# Patient Record
Sex: MALE | Race: WHITE | Employment: OTHER | URBAN - METROPOLITAN AREA
[De-identification: names, ages, dates, MRNs, and addresses within clinical notes are randomized per-mention and may not be internally consistent; named-entity substitution may affect disease eponyms.]

---

## 2017-07-17 ENCOUNTER — ANESTHESIA EVENT (OUTPATIENT)
Dept: SURGERY | Age: 67
End: 2017-07-17
Payer: MEDICARE

## 2017-07-18 ENCOUNTER — HOSPITAL ENCOUNTER (OUTPATIENT)
Age: 67
Setting detail: OUTPATIENT SURGERY
Discharge: HOME | End: 2017-07-18
Attending: ORTHOPAEDIC SURGERY | Admitting: ORTHOPAEDIC SURGERY
Payer: MEDICARE

## 2017-07-18 ENCOUNTER — ANESTHESIA (OUTPATIENT)
Dept: SURGERY | Age: 67
End: 2017-07-18
Payer: MEDICARE

## 2017-07-18 VITALS
TEMPERATURE: 97.8 F | SYSTOLIC BLOOD PRESSURE: 133 MMHG | DIASTOLIC BLOOD PRESSURE: 86 MMHG | RESPIRATION RATE: 16 BRPM | OXYGEN SATURATION: 95 % | WEIGHT: 163.5 LBS | BODY MASS INDEX: 23.46 KG/M2 | HEART RATE: 85 BPM

## 2017-07-18 PROCEDURE — 76010000138 HC OR TIME 0.5 TO 1 HR: Performed by: ORTHOPAEDIC SURGERY

## 2017-07-18 PROCEDURE — 76210000021 HC REC RM PH II 0.5 TO 1 HR: Performed by: ORTHOPAEDIC SURGERY

## 2017-07-18 PROCEDURE — 74011250636 HC RX REV CODE- 250/636: Performed by: ORTHOPAEDIC SURGERY

## 2017-07-18 PROCEDURE — 74011250637 HC RX REV CODE- 250/637: Performed by: ANESTHESIOLOGY

## 2017-07-18 PROCEDURE — 74011250636 HC RX REV CODE- 250/636

## 2017-07-18 PROCEDURE — 77030002916 HC SUT ETHLN J&J -A: Performed by: ORTHOPAEDIC SURGERY

## 2017-07-18 PROCEDURE — 74011250636 HC RX REV CODE- 250/636: Performed by: ANESTHESIOLOGY

## 2017-07-18 PROCEDURE — 77030031139 HC SUT VCRL2 J&J -A: Performed by: ORTHOPAEDIC SURGERY

## 2017-07-18 PROCEDURE — 76060000032 HC ANESTHESIA 0.5 TO 1 HR: Performed by: ORTHOPAEDIC SURGERY

## 2017-07-18 PROCEDURE — 77030018836 HC SOL IRR NACL ICUM -A: Performed by: ORTHOPAEDIC SURGERY

## 2017-07-18 PROCEDURE — 77030011640 HC PAD GRND REM COVD -A: Performed by: ORTHOPAEDIC SURGERY

## 2017-07-18 PROCEDURE — 74011000250 HC RX REV CODE- 250

## 2017-07-18 PROCEDURE — 77030000032 HC CUF TRNQT ZIMM -B: Performed by: ORTHOPAEDIC SURGERY

## 2017-07-18 PROCEDURE — 77030028224 HC PDNG CST BSNM -A: Performed by: ORTHOPAEDIC SURGERY

## 2017-07-18 PROCEDURE — 74011000250 HC RX REV CODE- 250: Performed by: ORTHOPAEDIC SURGERY

## 2017-07-18 RX ORDER — LIDOCAINE HYDROCHLORIDE 10 MG/ML
0.1 INJECTION INFILTRATION; PERINEURAL AS NEEDED
Status: DISCONTINUED | OUTPATIENT
Start: 2017-07-18 | End: 2017-07-18 | Stop reason: HOSPADM

## 2017-07-18 RX ORDER — SODIUM CHLORIDE, SODIUM LACTATE, POTASSIUM CHLORIDE, CALCIUM CHLORIDE 600; 310; 30; 20 MG/100ML; MG/100ML; MG/100ML; MG/100ML
150 INJECTION, SOLUTION INTRAVENOUS CONTINUOUS
Status: DISCONTINUED | OUTPATIENT
Start: 2017-07-18 | End: 2017-07-18 | Stop reason: HOSPADM

## 2017-07-18 RX ORDER — SODIUM CHLORIDE 0.9 % (FLUSH) 0.9 %
5-10 SYRINGE (ML) INJECTION AS NEEDED
Status: DISCONTINUED | OUTPATIENT
Start: 2017-07-18 | End: 2017-07-18 | Stop reason: HOSPADM

## 2017-07-18 RX ORDER — HYDROCODONE BITARTRATE AND ACETAMINOPHEN 5; 325 MG/1; MG/1
1 TABLET ORAL AS NEEDED
Status: DISCONTINUED | OUTPATIENT
Start: 2017-07-18 | End: 2017-07-18 | Stop reason: HOSPADM

## 2017-07-18 RX ORDER — HYDROCODONE BITARTRATE AND ACETAMINOPHEN 5; 325 MG/1; MG/1
1 TABLET ORAL
Qty: 20 TAB | Refills: 0 | Status: SHIPPED | OUTPATIENT
Start: 2017-07-18 | End: 2017-10-16

## 2017-07-18 RX ORDER — FENTANYL CITRATE 50 UG/ML
100 INJECTION, SOLUTION INTRAMUSCULAR; INTRAVENOUS ONCE
Status: DISCONTINUED | OUTPATIENT
Start: 2017-07-18 | End: 2017-07-18 | Stop reason: HOSPADM

## 2017-07-18 RX ORDER — FENTANYL CITRATE 50 UG/ML
INJECTION, SOLUTION INTRAMUSCULAR; INTRAVENOUS AS NEEDED
Status: DISCONTINUED | OUTPATIENT
Start: 2017-07-18 | End: 2017-07-18 | Stop reason: HOSPADM

## 2017-07-18 RX ORDER — FAMOTIDINE 20 MG/1
20 TABLET, FILM COATED ORAL ONCE
Status: COMPLETED | OUTPATIENT
Start: 2017-07-18 | End: 2017-07-18

## 2017-07-18 RX ORDER — SODIUM CHLORIDE 9 MG/ML
50 INJECTION, SOLUTION INTRAVENOUS CONTINUOUS
Status: DISCONTINUED | OUTPATIENT
Start: 2017-07-18 | End: 2017-07-18 | Stop reason: HOSPADM

## 2017-07-18 RX ORDER — MIDAZOLAM HYDROCHLORIDE 1 MG/ML
INJECTION, SOLUTION INTRAMUSCULAR; INTRAVENOUS AS NEEDED
Status: DISCONTINUED | OUTPATIENT
Start: 2017-07-18 | End: 2017-07-18 | Stop reason: HOSPADM

## 2017-07-18 RX ORDER — HYDROMORPHONE HYDROCHLORIDE 2 MG/ML
0.5 INJECTION, SOLUTION INTRAMUSCULAR; INTRAVENOUS; SUBCUTANEOUS
Status: DISCONTINUED | OUTPATIENT
Start: 2017-07-18 | End: 2017-07-18 | Stop reason: HOSPADM

## 2017-07-18 RX ORDER — ACETAMINOPHEN 500 MG
1000 TABLET ORAL
Status: DISCONTINUED | OUTPATIENT
Start: 2017-07-18 | End: 2017-07-18 | Stop reason: HOSPADM

## 2017-07-18 RX ORDER — LIDOCAINE HYDROCHLORIDE AND EPINEPHRINE 10; 10 MG/ML; UG/ML
INJECTION, SOLUTION INFILTRATION; PERINEURAL AS NEEDED
Status: DISCONTINUED | OUTPATIENT
Start: 2017-07-18 | End: 2017-07-18 | Stop reason: HOSPADM

## 2017-07-18 RX ORDER — LIDOCAINE HYDROCHLORIDE 5 MG/ML
INJECTION, SOLUTION INFILTRATION; INTRAVENOUS AS NEEDED
Status: DISCONTINUED | OUTPATIENT
Start: 2017-07-18 | End: 2017-07-18 | Stop reason: HOSPADM

## 2017-07-18 RX ORDER — SODIUM CHLORIDE 0.9 % (FLUSH) 0.9 %
5-10 SYRINGE (ML) INJECTION EVERY 8 HOURS
Status: DISCONTINUED | OUTPATIENT
Start: 2017-07-18 | End: 2017-07-18 | Stop reason: HOSPADM

## 2017-07-18 RX ORDER — MIDAZOLAM HYDROCHLORIDE 1 MG/ML
2 INJECTION, SOLUTION INTRAMUSCULAR; INTRAVENOUS
Status: DISCONTINUED | OUTPATIENT
Start: 2017-07-18 | End: 2017-07-18 | Stop reason: HOSPADM

## 2017-07-18 RX ORDER — CEFAZOLIN SODIUM IN 0.9 % NACL 2 G/50 ML
2 INTRAVENOUS SOLUTION, PIGGYBACK (ML) INTRAVENOUS ONCE
Status: COMPLETED | OUTPATIENT
Start: 2017-07-18 | End: 2017-07-18

## 2017-07-18 RX ADMIN — SODIUM CHLORIDE, SODIUM LACTATE, POTASSIUM CHLORIDE, AND CALCIUM CHLORIDE 150 ML/HR: 600; 310; 30; 20 INJECTION, SOLUTION INTRAVENOUS at 09:45

## 2017-07-18 RX ADMIN — FAMOTIDINE 20 MG: 20 TABLET ORAL at 09:45

## 2017-07-18 RX ADMIN — CEFAZOLIN 2 G: 1 INJECTION, POWDER, FOR SOLUTION INTRAMUSCULAR; INTRAVENOUS; PARENTERAL at 11:52

## 2017-07-18 RX ADMIN — LIDOCAINE HYDROCHLORIDE 50 ML: 5 INJECTION, SOLUTION INFILTRATION; INTRAVENOUS at 12:01

## 2017-07-18 RX ADMIN — MIDAZOLAM HYDROCHLORIDE 2 MG: 1 INJECTION, SOLUTION INTRAMUSCULAR; INTRAVENOUS at 11:56

## 2017-07-18 RX ADMIN — FENTANYL CITRATE 50 MCG: 50 INJECTION, SOLUTION INTRAMUSCULAR; INTRAVENOUS at 11:56

## 2017-07-18 RX ADMIN — FENTANYL CITRATE 50 MCG: 50 INJECTION, SOLUTION INTRAMUSCULAR; INTRAVENOUS at 12:03

## 2017-07-18 NOTE — DISCHARGE INSTRUCTIONS
POST OP INSTRUCTIONS      1. Patient has appointment for 7/27/17 at 3:35 PM at the [facility]. 2.  For problems call Dr Guero Mccoy, [facility],  [phone]          Appointments only,  [phone]    3. Ice and elevate the surgical site. 4.  May remove Ace wrap, leave Tegaderm on, and wash in running water or shower. DIET  · Clear liquids until no nausea or vomiting; then light diet for the first day. · Advance to regular diet on second day, unless your doctor orders otherwise. · If nausea and vomiting continues, call your doctor. CALL YOUR DOCTOR IF   · Excessive bleeding that does not stop after holding pressure over the area  · Temperature of 101 degrees F or above  · Excessive redness, swelling or bruising, and/ or green or yellow, smelly discharge from incision    AFTER ANESTHESIA   · For the first 24 hours: DO NOT Drive, Drink alcoholic beverages, or Make important decisions. · Be aware of dizziness following anesthesia and while taking pain medication. APPOINTMENT DATE/ TIME See above    YOUR DOCTOR'S PHONE NUMBER [phone]      DISCHARGE SUMMARY from Nurse    PATIENT INSTRUCTIONS:    After general anesthesia or intravenous sedation, for 24 hours or while taking prescription Narcotics:  · Limit your activities  · Do not drive and operate hazardous machinery  · Do not make important personal or business decisions  · Do  not drink alcoholic beverages  · If you have not urinated within 8 hours after discharge, please contact your surgeon on call. *  Please give a list of your current medications to your Primary Care Provider. *  Please update this list whenever your medications are discontinued, doses are      changed, or new medications (including over-the-counter products) are added. *  Please carry medication information at all times in case of emergency situations.       These are general instructions for a healthy lifestyle:    No smoking/ No tobacco products/ Avoid exposure to second hand smoke    Surgeon General's Warning:  Quitting smoking now greatly reduces serious risk to your health. Obesity, smoking, and sedentary lifestyle greatly increases your risk for illness    A healthy diet, regular physical exercise & weight monitoring are important for maintaining a healthy lifestyle    You may be retaining fluid if you have a history of heart failure or if you experience any of the following symptoms:  Weight gain of 3 pounds or more overnight or 5 pounds in a week, increased swelling in our hands or feet or shortness of breath while lying flat in bed. Please call your doctor as soon as you notice any of these symptoms; do not wait until your next office visit. Recognize signs and symptoms of STROKE:    F-face looks uneven    A-arms unable to move or move unevenly    S-speech slurred or non-existent    T-time-call 911 as soon as signs and symptoms begin-DO NOT go       Back to bed or wait to see if you get better-TIME IS BRAIN.

## 2017-07-18 NOTE — IP AVS SNAPSHOT
[address] 
[phone] Patient: Zack Maravilla MRN: [id number] NTD:1/[birth year]74 You are allergic to the following Allergen Reactions Mobic (Meloxicam) Other (comments) Cause BP elevation Recent Documentation Weight BMI Smoking Status 74.2 kg 23.46 kg/m2 Current Every Day Smoker Emergency Contacts Name Discharge Info Relation Home Work Mobile Stefan David [5] [phone] About your hospitalization You were admitted on:  July 18, 2017 You last received care in the:  [facility] OP PACU You were discharged on:  July 18, 2017 Unit phone number:  [phone] Why you were hospitalized Your primary diagnosis was:  Not on File Providers Seen During Your Hospitalizations Provider Role Specialty Primary office phone Susie Chong MD Attending Provider Orthopedic Surgery [phone] Your Primary Care Physician (PCP) Primary Care Physician Office Phone Office Fax Courtney Hernández [phone] Follow-up Information Follow up With Details Comments Contact Info Star Camacho MD   [address] [place] [address] 
[phone] Current Discharge Medication List  
  
START taking these medications Dose & Instructions Dispensing Information Comments Morning Noon Evening Bedtime HYDROcodone-acetaminophen 5-325 mg per tablet Commonly known as:  Maria Del Rosario Good Your last dose was: Your next dose is:    
   
   
 Dose:  1 Tab Take 1 Tab by mouth every four (4) hours as needed for Pain. Max Daily Amount: 6 Tabs. Quantity:  20 Tab Refills:  0 CONTINUE these medications which have CHANGED Dose & Instructions Dispensing Information Comments Morning Noon Evening Bedtime  
 amLODIPine 5 mg tablet Commonly known as:  Dagoberto Nunez What changed:  when to take this Your last dose was: Your next dose is:    
   
   
 Dose:  5 mg Take 1 Tab by mouth daily. Quantity:  90 Tab Refills:  1  
     
   
   
   
  
 folic acid 1 mg tablet Commonly known as:  Uche What changed:  when to take this Your last dose was: Your next dose is:    
   
   
 Dose:  1 mg Take 1 Tab by mouth daily. Quantity:  30 Tab Refills:  2 CONTINUE these medications which have NOT CHANGED Dose & Instructions Dispensing Information Comments Morning Noon Evening Bedtime  
 clobetasol 0.05 % topical gel Commonly known as:  Chito Nate Your last dose was: Your next dose is:    
   
   
 Apply  to affected area two (2) times a day. Quantity:  60 g Refills:  5 HUMIRA PEN 40 mg/0.8 mL injection pen Generic drug:  adalimumab Your last dose was: Your next dose is:    
   
   
 Dose:  40 mg  
40 mg by SubCUTAneous route every fourteen (14) days. Refills:  0  
     
   
   
   
  
 predniSONE 1 mg tablet Commonly known as:  Edu Zhang Your last dose was: Your next dose is: Take  by mouth as needed. Refills:  0  
     
   
   
   
  
 sildenafil citrate 100 mg tablet Commonly known as:  VIAGRA Your last dose was: Your next dose is:    
   
   
 Dose:  100 mg Take 1 Tab by mouth as needed. Quantity:  10 Tab Refills:  5 STOP taking these medications   
 traMADol 50 mg tablet Commonly known as:  ULTRAM  
   
  
  
  
Where to Get Your Medications Information on where to get these meds will be given to you by the nurse or doctor. ! Ask your nurse or doctor about these medications HYDROcodone-acetaminophen 5-325 mg per tablet Discharge Instructions POST OP INSTRUCTIONS 1.  Patient has appointment for 7/27/17 at 3:35 PM at the [facility]. 2.  For problems call Dr Keshia Rodriguez, [id number] [facility],  [phone] Appointments only,  [phone] 
 
3. Ice and elevate the surgical site. 4.  May remove Ace wrap, leave Tegaderm on, and wash in running water or shower. DIET · Clear liquids until no nausea or vomiting; then light diet for the first day. · Advance to regular diet on second day, unless your doctor orders otherwise. · If nausea and vomiting continues, call your doctor. CALL YOUR DOCTOR IF  
· Excessive bleeding that does not stop after holding pressure over the area · Temperature of 101 degrees F or above · Excessive redness, swelling or bruising, and/ or green or yellow, smelly discharge from incision AFTER ANESTHESIA · For the first 24 hours: DO NOT Drive, Drink alcoholic beverages, or Make important decisions. · Be aware of dizziness following anesthesia and while taking pain medication. APPOINTMENT DATE/ TIME See above YOUR DOCTOR'S PHONE NUMBER [phone] DISCHARGE SUMMARY from Nurse PATIENT INSTRUCTIONS: 
 
After general anesthesia or intravenous sedation, for 24 hours or while taking prescription Narcotics: · Limit your activities · Do not drive and operate hazardous machinery · Do not make important personal or business decisions · Do  not drink alcoholic beverages · If you have not urinated within 8 hours after discharge, please contact your surgeon on call. *  Please give a list of your current medications to your Primary Care Provider. *  Please update this list whenever your medications are discontinued, doses are 
    changed, or new medications (including over-the-counter products) are added. *  Please carry medication information at all times in case of emergency situations. These are general instructions for a healthy lifestyle: No smoking/ No tobacco products/ Avoid exposure to second hand smoke Surgeon General's Warning:  Quitting smoking now greatly reduces serious risk to your health. Obesity, smoking, and sedentary lifestyle greatly increases your risk for illness A healthy diet, regular physical exercise & weight monitoring are important for maintaining a healthy lifestyle You may be retaining fluid if you have a history of heart failure or if you experience any of the following symptoms:  Weight gain of 3 pounds or more overnight or 5 pounds in a week, increased swelling in our hands or feet or shortness of breath while lying flat in bed. Please call your doctor as soon as you notice any of these symptoms; do not wait until your next office visit. Recognize signs and symptoms of STROKE: 
 
F-face looks uneven A-arms unable to move or move unevenly S-speech slurred or non-existent T-time-call 911 as soon as signs and symptoms begin-DO NOT go Back to bed or wait to see if you get better-TIME IS BRAIN. Discharge Orders None ACO Transitions of Care Introducing [employer] [address]leg[address] offers a voluntary care coordination program to provide high quality service and care to [facility] fee-for-service beneficiaries. Will Tomlinson was designed to help you enhance your health and well-being through the following services: ? Transitions of Care  support for individuals who are transitioning from one care setting to another (example: Hospital to home). ? Chronic and Complex Care Coordination  support for individuals and caregivers of those with serious or chronic illnesses or with more than one chronic (ongoing) condition and those who take a number of different medications.   
 
 
If you meet specific medical criteria, a [address] [place] [address] Rd may call you directly to coordinate your care with your primary care physician and your other care providers. For questions about the [facility] programs, please, contact your physicians office. For general questions or additional information about Accountable Care Organizations: 
Please visit www.medicare.gov/acos. html or call 1-800-MEDICARE (1-701.901.3218) TTY users should call 3-517.191.9797. Introducing [address]! Daria Campbell introduces [email or web address] patient portal. Now you can access parts of your medical record, email your doctor's office, and request medication refills online. 1. In your internet browser, go to [email or web address] 2. Click on the First Time User? Click Here link in the Sign In box. You will see the New Member Sign Up page. 3. Enter your [email or web address] Access Code exactly as it appears below. You will not need to use this code after youve completed the sign-up process. If you do not sign up before the expiration date, you must request a new code. · [email or web address] Access Code: [email or web address] Expires: 9/11/2017  8:23 AM 
 
4. Enter the last four digits of your Social Security Number (xxxx) and Date of Birth (mm/dd/yyyy) as indicated and click Submit. You will be taken to the next sign-up page. 5. Create a [email or web address] ID. This will be your [email or web address] login ID and cannot be changed, so think of one that is secure and easy to remember. 6. Create a [email or web address] password. You can change your password at any time. 7. Enter your Password Reset Question and Answer. This can be used at a later time if you forget your password. 8. Enter your e-mail address. You will receive e-mail notification when new information is available in [address] 9. Click Sign Up. You can now view and download portions of your medical record. 10. Click the Download Summary menu link to download a portable copy of your medical information.  
 
If you have questions, please visit the Frequently Asked Questions section of the [employer]. Remember, MyChart is NOT to be used for urgent needs. For medical emergencies, dial 911. Now available from your iPhone and Android! General Information Please provide this summary of care documentation to your next provider. Patient Signature:  ____________________________________________________________ Date:  ____________________________________________________________  
  
Lavanda Ferries Provider Signature:  ____________________________________________________________ Date:  ____________________________________________________________

## 2017-07-18 NOTE — ANESTHESIA PROCEDURE NOTES
Peripheral Block    Start time: 7/18/2017 9:33 AM  End time: 7/18/2017 9:40 AM  Performed by: Marc Pedersen  Authorized by: Marc Pedersen       Pre-procedure:    Indications: at surgeon's request and post-op pain management    Preanesthetic Checklist: patient identified, risks and benefits discussed, site marked, timeout performed, anesthesia consent given and patient being monitored    Timeout Time: 09:33          Block Type:   Block Type:  Axillary  Laterality:  Left  Monitoring:  Standard ASA monitoring, continuous pulse ox, frequent vital sign checks, heart rate, responsive to questions and oxygen  Injection Technique:  Single shot  Procedures: ultrasound guided and nerve stimulator    Patient Position: seated  Prep: chlorhexidine    Location:  Axilla  Needle Type:  Stimuplex  Needle Gauge:  22 G  Needle Localization:  Ultrasound guidance and nerve stimulator  Motor Response: minimal motor response >0.4 mA    Medication Injected:  0.5%  ropivacaine  Adds:  Epi 1:200K  Volume (mL):  30  Add'l Medication Injected:  1.5%  mepivacaine  Volume (mL):  10    Assessment:  Number of attempts:  1  Injection Assessment:  Incremental injection every 5 mL, local visualized surrounding nerve on ultrasound, negative aspiration for CSF, negative aspiration for blood, no intravascular symptoms, no paresthesia and ultrasound image on chart  Patient tolerance:  Patient tolerated the procedure well with no immediate complications  All needles out intact, procedure tolerated well without problems

## 2017-07-18 NOTE — ANESTHESIA POSTPROCEDURE EVALUATION
Post-Anesthesia Evaluation and Assessment    Patient: Noe Ziegler MRN: [id number]  SSN: [id number]    YOB: 1950  Age: 61 y.o. Sex: male       Cardiovascular Function/Vital Signs  Visit Vitals    /86 (BP 1 Location: Left arm, BP Patient Position: At rest;Supine)    Pulse 85    Temp 36.6 °C (97.8 °F)    Resp 16    Wt 74.2 kg (163 lb 8 oz)    SpO2 95%    BMI 23.46 kg/m2       Patient is status post total IV anesthesia anesthesia for Procedure(s):  RIGHT ELBOW EXCISION RHEUMATOID NODULE. Nausea/Vomiting: None    Postoperative hydration reviewed and adequate. Pain:  Pain Scale 1: Numeric (0 - 10) (07/18/17 1250)  Pain Intensity 1: 0 (07/18/17 1250)   Managed    Neurological Status:   Neuro (WDL): Within Defined Limits (07/18/17 1250)   At baseline    Mental Status and Level of Consciousness: Arousable    Pulmonary Status:   O2 Device: Room air (07/18/17 1250)   Adequate oxygenation and airway patent    Complications related to anesthesia: None    Post-anesthesia assessment completed.  No concerns    Signed By: Ruben Sutton MD     July 18, 2017

## 2017-07-18 NOTE — ANESTHESIA PREPROCEDURE EVALUATION
Anesthetic History   No history of anesthetic complications            Review of Systems / Medical History  Patient summary reviewed, nursing notes reviewed and pertinent labs reviewed    Pulmonary    COPD: mild      Smoker      Comments: Remote h/o pulmonary wedge resection   Neuro/Psych   Within defined limits           Cardiovascular    Hypertension: well controlled              Exercise tolerance: >4 METS     GI/Hepatic/Renal     GERD: well controlled           Endo/Other        Arthritis (RA)     Other Findings              Physical Exam    Airway  Mallampati: II  TM Distance: 4 - 6 cm  Neck ROM: normal range of motion   Mouth opening: Normal     Cardiovascular    Rhythm: regular  Rate: normal         Dental    Dentition: Full lower dentures and Full upper dentures     Pulmonary        Wheezes (slight./):bilateral         Abdominal         Other Findings            Anesthetic Plan    ASA: 3  Anesthesia type: total IV anesthesia - Tana block      Post-op pain plan if not by surgeon: peripheral nerve block single    Induction: Intravenous  Anesthetic plan and risks discussed with: Patient

## 2017-07-18 NOTE — OP NOTES
[facility]   OPERATIVE REPORT       Name:  Rosemarie Beckford   MR#:  [id number]   :  1950   Account #:  [de-identified]   Date of Adm:  2017       DATE OF SURGERY: 2017     PREOPERATIVE DIAGNOSIS: Rheumatoid nodule--mass of the right   elbow. POSTOPERATIVE DIAGNOSIS: Rheumatoid nodule--mass of the right   elbow. PROCEDURE: Excision of mass from the right elbow. SURGEON: SOFIA Vines MD    ANESTHESIA: IV regional.     PROCEDURAL NOTE: With the patient under an IV regional   anesthetic, the right upper extremity was prepped with   ChloraPrep and draped as a sterile field. A time-out was held   identifying the patient, surgeon, procedure, and operative site. The patient had been given intravenous prophylactic Ancef   preoperatively. The patient has had a previous excision of   olecranon bursa just proximal to the mass and along the   subcutaneous border of the posterior aspect of the proximal   ulna. The old surgical scar was extended a bit further distally,   incorporating the most distal portion of the scar into the new   incision. Sharp and spreading dissection was then used around the   mass, which was about 2.5 x 1.5 x 1.5 cm in size and oval   shaped. The mass was dissected free from the surrounding   subcutaneous tissue and muscle fascia, and excised. It was firm   and felt to be a rheumatoid nodule. It was not sent for   histologic exam. The incision site was irrigated with sterile   saline and then closed with a running stitch of 4-0 nylon. A   sterile dressing of gauze and Tegaderm was applied, followed   by an Ace wrap for compression. Prior to the closure of the   wound, it was infiltrated with 8 mL of 1% lidocaine with   epinephrine. The patient tolerated the procedure well and was   sent to the recovery room in good condition.         MD KENDELL Ventura / ASHU   D:  2017   12:30   T:  2017   12:53   Job #:  [id number]

## 2017-07-18 NOTE — ANESTHESIA PROCEDURE NOTES
Peripheral Block    Start time: 7/18/2017 11:58 AM  End time: 7/18/2017 12:02 PM  Performed by: Marcus Ramirez  Authorized by: Corie Thompson       Pre-procedure:    Indications: at surgeon's request, procedure for pain and primary anesthetic    Preanesthetic Checklist: patient identified, risks and benefits discussed, site marked, timeout performed, anesthesia consent given and patient being monitored    Timeout Time: 11:58          Block Type:   Block Type:  Tana block  Laterality:  Right  Patient Position:  Flat  Block Limb IV Checked: Yes    Esmarch exsanguination: Yes    Tourniquet Type:  Single  Tourniquet Location:  Above elbow  Tourniquet Inflated:  7/18/2017 12:02 PM  Inflation (mmHg):  250  Infused Agent:  Lidocaine 0.5%  Volume Infused (mL):  50  Tourniquet Deflated:  7/18/2017 12:18 PM    Assessment:    Injection Assessment:   Patient tolerance:  Patient tolerated the procedure well with no immediate complications

## 2017-11-02 ENCOUNTER — HOSPITAL ENCOUNTER (OUTPATIENT)
Dept: CT IMAGING | Age: 67
Discharge: HOME | End: 2017-11-02
Attending: INTERNAL MEDICINE
Payer: MEDICARE

## 2017-11-02 DIAGNOSIS — Z72.0 TOBACCO USE: ICD-10-CM

## 2017-11-02 DIAGNOSIS — Z87.891 PERSONAL HISTORY OF NICOTINE DEPENDENCE: ICD-10-CM

## 2017-11-02 PROCEDURE — G0297 LDCT FOR LUNG CA SCREEN: HCPCS

## 2018-05-01 ENCOUNTER — HOSPITAL ENCOUNTER (OUTPATIENT)
Dept: CT IMAGING | Age: 68
Discharge: HOME | End: 2018-05-01
Attending: INTERNAL MEDICINE
Payer: MEDICARE

## 2018-05-01 DIAGNOSIS — R91.8 LUNG NODULES: ICD-10-CM

## 2018-05-01 PROCEDURE — 71250 CT THORAX DX C-: CPT

## 2018-11-20 ENCOUNTER — HOSPITAL ENCOUNTER (OUTPATIENT)
Dept: CT IMAGING | Age: 68
Discharge: HOME | End: 2018-11-20
Attending: INTERNAL MEDICINE
Payer: SELF-PAY

## 2018-11-20 DIAGNOSIS — E78.2 MIXED HYPERLIPIDEMIA: ICD-10-CM

## 2018-11-20 DIAGNOSIS — I10 ESSENTIAL HYPERTENSION: ICD-10-CM

## 2018-11-20 PROCEDURE — 75571 CT HRT W/O DYE W/CA TEST: CPT

## 2018-11-20 NOTE — PROGRESS NOTES
Ca score was over 1000 which is pretty high. I would recommend starting a cholesterol medicine Crestor 20 and a baby Asa daily. With a score that high he also needs an NM Stress (for CAD).

## 2018-11-26 NOTE — PROGRESS NOTES
Pt notified that Ca score was over 1000 which is pretty high. Dr. Lonny Pearson would recommend starting a cholesterol medicine Crestor 20 and a baby Asa daily. With a score that high he also needs an NM Stress (for CAD).

## 2019-03-25 PROBLEM — I25.10 CORONARY ARTERY DISEASE DUE TO CALCIFIED CORONARY LESION: Status: ACTIVE | Noted: 2019-03-25

## 2019-03-25 PROBLEM — I25.84 CORONARY ARTERY DISEASE DUE TO CALCIFIED CORONARY LESION: Status: ACTIVE | Noted: 2019-03-25

## 2019-08-02 ENCOUNTER — HOSPITAL ENCOUNTER (OUTPATIENT)
Dept: GENERAL RADIOLOGY | Age: 69
Discharge: HOME | End: 2019-08-02
Attending: INTERNAL MEDICINE
Payer: MEDICARE

## 2019-08-02 ENCOUNTER — HOSPITAL ENCOUNTER (OUTPATIENT)
Dept: CT IMAGING | Age: 69
Discharge: HOME | End: 2019-08-02
Attending: INTERNAL MEDICINE
Payer: MEDICARE

## 2019-08-02 DIAGNOSIS — R63.4 WEIGHT LOSS: ICD-10-CM

## 2019-08-02 DIAGNOSIS — R91.1 PULMONARY NODULE: ICD-10-CM

## 2019-08-02 PROCEDURE — 71046 X-RAY EXAM CHEST 2 VIEWS: CPT

## 2019-08-02 PROCEDURE — 74177 CT ABD & PELVIS W/CONTRAST: CPT

## 2019-08-02 PROCEDURE — 74011000258 HC RX REV CODE- 258: Performed by: INTERNAL MEDICINE

## 2019-08-02 PROCEDURE — 74011636320 HC RX REV CODE- 636/320: Performed by: INTERNAL MEDICINE

## 2019-08-02 RX ORDER — SODIUM CHLORIDE 0.9 % (FLUSH) 0.9 %
10 SYRINGE (ML) INJECTION
Status: COMPLETED | OUTPATIENT
Start: 2019-08-02 | End: 2019-08-02

## 2019-08-02 RX ADMIN — SODIUM CHLORIDE 100 ML: 900 INJECTION, SOLUTION INTRAVENOUS at 15:57

## 2019-08-02 RX ADMIN — IOPAMIDOL 100 ML: 755 INJECTION, SOLUTION INTRAVENOUS at 15:57

## 2019-08-02 RX ADMIN — Medication 10 ML: at 15:57

## 2019-08-02 RX ADMIN — DIATRIZOATE MEGLUMINE AND DIATRIZOATE SODIUM 15 ML: 660; 100 LIQUID ORAL; RECTAL at 15:57

## 2019-08-22 PROBLEM — D36.7 DERMOID CYST OF LEFT LOWER EXTREMITY: Status: ACTIVE | Noted: 2019-08-22

## 2019-08-28 PROBLEM — R63.4 WEIGHT LOSS, UNINTENTIONAL: Status: ACTIVE | Noted: 2019-08-28

## 2019-08-28 PROBLEM — R06.09 DYSPNEA ON EXERTION: Status: ACTIVE | Noted: 2019-08-28

## 2019-11-26 PROBLEM — R63.4 WEIGHT LOSS, UNINTENTIONAL: Status: RESOLVED | Noted: 2019-08-28 | Resolved: 2019-11-26

## 2020-09-04 ENCOUNTER — HOSPITAL ENCOUNTER (OUTPATIENT)
Dept: CT IMAGING | Age: 70
Discharge: HOME | End: 2020-09-04
Attending: INTERNAL MEDICINE
Payer: MEDICARE

## 2020-09-04 VITALS — HEIGHT: 70 IN | WEIGHT: 155 LBS | BODY MASS INDEX: 22.19 KG/M2

## 2020-09-04 DIAGNOSIS — Z87.891 PERSONAL HISTORY OF NICOTINE DEPENDENCE: ICD-10-CM

## 2020-09-04 DIAGNOSIS — Z12.2 ENCOUNTER FOR SCREENING FOR LUNG CANCER: ICD-10-CM

## 2020-09-04 PROCEDURE — G0297 LDCT FOR LUNG CA SCREEN: HCPCS

## 2021-09-08 ENCOUNTER — HOSPITAL ENCOUNTER (OUTPATIENT)
Dept: CT IMAGING | Age: 71
Discharge: HOME | End: 2021-09-08
Attending: INTERNAL MEDICINE
Payer: MEDICARE

## 2021-09-08 DIAGNOSIS — Z87.891 PERSONAL HISTORY OF NICOTINE DEPENDENCE: ICD-10-CM

## 2021-09-08 DIAGNOSIS — Z12.2 ENCOUNTER FOR SCREENING FOR LUNG CANCER: ICD-10-CM

## 2021-09-08 PROCEDURE — 71271 CT THORAX LUNG CANCER SCR C-: CPT

## 2022-03-19 PROBLEM — I25.10 CORONARY ARTERY DISEASE DUE TO CALCIFIED CORONARY LESION: Status: ACTIVE | Noted: 2019-03-25

## 2022-03-19 PROBLEM — I25.84 CORONARY ARTERY DISEASE DUE TO CALCIFIED CORONARY LESION: Status: ACTIVE | Noted: 2019-03-25

## 2022-03-20 PROBLEM — R06.09 DYSPNEA ON EXERTION: Status: ACTIVE | Noted: 2019-08-28

## 2022-03-20 PROBLEM — D36.7 DERMOID CYST OF LEFT LOWER EXTREMITY: Status: ACTIVE | Noted: 2019-08-22

## 2022-04-19 ENCOUNTER — HOSPITAL ENCOUNTER (OUTPATIENT)
Dept: PHYSICAL THERAPY | Age: 72
Discharge: HOME | End: 2022-04-19
Payer: MEDICARE

## 2022-04-19 DIAGNOSIS — M19.049 CMC ARTHRITIS: ICD-10-CM

## 2022-04-19 DIAGNOSIS — M25.532 PAIN IN BOTH WRISTS: ICD-10-CM

## 2022-04-19 DIAGNOSIS — G89.29 CHRONIC BILATERAL LOW BACK PAIN WITHOUT SCIATICA: ICD-10-CM

## 2022-04-19 DIAGNOSIS — M25.531 PAIN IN BOTH WRISTS: ICD-10-CM

## 2022-04-19 DIAGNOSIS — M05.79 RHEUMATOID ARTHRITIS INVOLVING MULTIPLE SITES WITH POSITIVE RHEUMATOID FACTOR (HCC): ICD-10-CM

## 2022-04-19 DIAGNOSIS — M54.50 CHRONIC BILATERAL LOW BACK PAIN WITHOUT SCIATICA: ICD-10-CM

## 2022-04-19 PROCEDURE — 97161 PT EVAL LOW COMPLEX 20 MIN: CPT

## 2022-04-19 PROCEDURE — 97110 THERAPEUTIC EXERCISES: CPT

## 2022-04-19 PROCEDURE — 97140 MANUAL THERAPY 1/> REGIONS: CPT

## 2022-04-19 NOTE — THERAPY EVALUATION
Vish Kenney  : 1950      Payor: Carson Harrison / Plan: [address] / Product Type: PPO /  [address] at [facility] Therapy  [address], [address]  [phone]   [phone]         OUTPATIENT PHYSICAL THERAPY:Initial Assessment 2022      ICD-10: Treatment Diagnosis:   Stiffness of left hand, not elsewhere classified (M25.642)  Stiffness of right hand, not elsewhere classified (M25.641)  Low back pain (M54.5)  Lumbago with Sciatica Right side (M54.41)  Lumbago with Sciatica Left side (M54.42)  Muscle Weakness, Generalized (M62.81)                 Precautions/Allergies:   Mobic [meloxicam]   Fall Risk Score: 1 (? 5 = High Risk)  MD Orders: Eval and Treat  MEDICAL/REFERRING DIAGNOSIS:  Chronic bilateral low back pain without sciatica [M54.50, G89.29]  Pain in both wrists [M25.531, M25.532]  Rheumatoid arthritis involving multiple sites with positive rheumatoid factor (HCC) [M05.79]  CMC arthritis [M19.049]   DATE OF ONSET: *Chronic from RA  REFERRING PHYSICIAN: Ester Cole*  RETURN PHYSICIAN APPOINTMENT: 22     INITIAL ASSESSMENT:  Mr. Vickie Adams presents to physical therapy with decreased postural and hip/core strength, ROM, joint mobility, flexibility, functional mobility, and increased pain. No pelvic malalignment upon initial evaluation but decreased posture. These S/S are consistent with low back pain and mention of B wrist pain and decreased function. He has hx that includes RA, 2 lung surgeries, and COPD*. Some difficulty describing goals for therapy as well as verbalizing symptoms. 0 degree cervical extension w/ FHP. Vickie Adams will benefit from skilled physical therapy (medically necessary) to address above deficits affecting participation in basic ADLs and functional mobility/tolerance.  Patient will benefit from manual therapeutic techniques (stretching, joint mobilizations, soft tissue mobilization/myofascial release), therapeutic exercises and activities, postural strengthening/education, and comprehensive home exercises program to address current impairments and functional limitations. PROBLEM LIST (Impacting functional limitations):  1. Decreased Strength  2. Decreased ADL/Functional Activities  3. Decreased Transfer Abilities  4. Decreased Ambulation Ability/Technique  5. Decreased Balance  6. Increased Pain  7. Decreased Activity Tolerance  8. Increased Fatigue  9. Increased Shortness of Breath  10. Decreased Flexibility/Joint Mobility  11. Decreased [place] with Home Exercise Program INTERVENTIONS PLANNED:  1. Balance Exercise  2. Bed Mobility  3. Cold  4. Cryotherapy  5. Electrical Stimulation  6. Family Education  7. Gait Training  8. Heat  9. Home Exercise Program (HEP)  10. Manual Therapy  11. Neuromuscular Re-education/Strengthening  12. Range of Motion (ROM)  13. Therapeutic Activites  14. Therapeutic Exercise/Strengthening  15. Transfer Training  16. Lumbar Traction  17. Aquatic Therapy   TREATMENT PLAN:  Effective Dates: 4/19/2022 TO 7/18/2022 (90 days). Frequency/Duration: 2 times a week for 90 Days  GOALS: (Goals have been discussed and agreed upon with patient.)     Short-Term Goals~4 weeks  Goal Met   1. Eduardo will be independent with HEP 1.  [] Date:   2. Eduardo will participate in LE stretching program to increase flexibility    2. [] Date:   3. Eduardo will participate in core stabilization exercises to help with stabilization during ADLs 3. [] Date:   4. Eduardo will participate in LE strengthening program with weights/resistance as appropriate to help with gait and elevations 4. [] Date:   5. Eduardo will be able to improve neck and back mobility and demonstrate no pain with ROM during ADLs 5. [] Date:   6.  Eduardo will improve cervical extension from 0 deg to 5 degrees to assist with driving and mobility  6. [] Date:              Long Term Goals~8 weeks Goal Met   1. Janet Beltran will demonstrate a 4 point improvement on the Oswestry to show improvement in function 1. [] Date:   2. Janet Beltran will report 0/10 pain at rest and during ADLs  2. [] Date:   3.  3.  [] Date:   4.  4.  [] Date:                 Outcome Measure: Tool Used: Modified Oswestry Low Back Pain Questionnaire    Score:  Initial: 10/50  Most Recent: X/50 (Date: -- )   Interpretation of Score: Each section is scored on a 0-5 scale, 5 representing the greatest disability. The scores of each section are added together for a total score of 50. Medical Necessity:   · Skilled intervention continues to be required due to above deficits affecting participation in basic ADLs and overall functional tolerance. Reason for Services/Other Comments:  · Patient continues to require skilled intervention due to  above deficits affecting participation in basic ADLs and overall functional tolerance. Total Treatment Duration:       Rehabilitation Potential For Stated Goals: [facility]'s therapy, I certify that the treatment plan above will be carried out by a therapist or under their direction. Thank you for this referral,  Celina Meng DPT     Referring Physician Signature: Keary Sacks _______________________________ Date _____________            HISTORY:   History of Present Injury/Illness (Reason for Referral):  *Janet Beltran states that he has pain to his lower back but what really gets him going is that last time he started having pain in his wrist (extensor pollicis and wrist extensor) and then it spread to his L hand. The pain got so bad he had difficulty moving his fingers. \"I have had arthritis for 20+ years now and I went from [address] [place] [address] Way and that created my two lung surgeries.   I went on Humira and it's an injection every other week for my arthritis. My neck hurts and is limited also. My back is limiting my to what I can and cannot do. -Present symptoms/complaints (on day of evaluation)  Pain Scale:  · Current: 6/10  To lower back  · Best: 5/10  · Worst: 9/10      · Aggravating factors: \"I don't know. I really don't know. Some days its bad. \"  · Relieving factors: Rest  · Irritability: Medium (Onset of pain is equal to alleviation)      Past Medical History/Comorbidities:   Mr. Saba Harris  has a past medical history of Adverse effect of anesthesia, Chronic obstructive pulmonary disease ([facility] ED (erectile dysfunction), Full dentures, GERD (gastroesophageal reflux disease), History of skin cancer, Lung collapse (2012), and RA (rheumatoid arthritis) ([facility] Mr. Saba Harris  has a past surgical history that includes hx orthopaedic (Bilateral, 2013); pr chest surgery procedure unlisted (Left, 2006); pr chest surgery procedure unlisted (Left, 2007); pr chest surgery procedure unlisted (Right, right); hx orthopaedic (Bilateral); and hx colonoscopy (11/18/2019). Social History/Living Environment:        Social History     Socioeconomic History    Marital status: LIFE PARTNER     Spouse name: Not on file    Number of children: Not on file    Years of education: Not on file    Highest education level: Not on file   Occupational History    Not on file   Tobacco Use    Smoking status: Current Every Day Smoker     Packs/day: 1.00     Years: 30.00     Pack years: 30.00     Types: Cigarettes    Smokeless tobacco: Never Used    Tobacco comment: decreased from 1 pk/day x 30 years   Vaping Use    Vaping Use: Never used   Substance and Sexual Activity    Alcohol use: Yes     Alcohol/week: 5.0 standard drinks     Types: 6 Glasses of wine per week    Drug use: No    Sexual activity: Not on file   Other Topics Concern    Not on file   Social History Narrative    Lives with girlfriend. Still works.      Social Determinants of Health Financial Resource Strain:     Difficulty of Paying Living Expenses: Not on file   Food Insecurity:     Worried About Running Out of Food in the Last Year: Not on file    Bhargav of Food in the Last Year: Not on file   Transportation Needs:     Lack of Transportation (Medical): Not on file    Lack of Transportation (Non-Medical):  Not on file   Physical Activity:     Days of Exercise per Week: Not on file    Minutes of Exercise per Session: Not on file   Stress:     Feeling of Stress : Not on file   Social Connections:     Frequency of Communication with Friends and Family: Not on file    Frequency of Social Gatherings with Friends and Family: Not on file    Attends Mosque Services: Not on file    Active Member of [address] [place] [address] [employer] or Organizations: Not on file    Attends Club or Organization Meetings: Not on file    Marital Status: Not on file   Intimate Partner Violence:     Fear of Current or Ex-Partner: Not on file    Emotionally Abused: Not on file    Physically Abused: Not on file    Sexually Abused: Not on file   Housing Stability:     Unable to Pay for Housing in the Last Year: Not on file    Number of [place] in the Last Year: Not on file    Unstable Housing in the Last Year: Not on file     Prior Level of Function/Work/Activity:  Normal  Previous Treatment Approach  Previous Physical Therapy   Dominant Side: Right  Other Clinical Tests  None per [facility]     Active Ambulatory Problems     Diagnosis Date Noted    Essential hypertension 06/06/2016    Mixed hyperlipidemia 06/06/2016    Rheumatoid arthritis involving multiple sites with positive rheumatoid factor ([facility] 06/06/2016    Pulmonary nodule 06/06/2016    Coronary artery disease due to calcified coronary lesion 03/25/2019    Dermoid cyst of left lower extremity 08/22/2019    Dyspnea on exertion 08/28/2019     Resolved Ambulatory Problems     Diagnosis Date Noted    Rheumatoid arthritis 08/14/2012    HLD (hyperlipidemia) 08/27/2012    GERD (gastroesophageal reflux disease) 08/27/2012    Allergic rhinitis 08/27/2012    ED (erectile dysfunction) 08/27/2012    Osteoarthrosis, unspecified whether generalized or localized, hand 03/06/2013    Pulmonary nodule 09/18/2013    Depression 09/18/2013    Pneumonia, organism unspecified(486) 03/23/2014    [facility] 03/23/2014    Tobacco abuse 03/23/2014    Acute respiratory failure ([facility] 03/23/2014    Elevated BP 07/27/2015    RA (rheumatoid arthritis) ([facility] 07/27/2015    HLD (hyperlipidemia) 07/27/2015    Erectile dysfunction 07/27/2015    Pulmonary nodule 07/27/2015    Essential hypertension 11/30/2015    HLD (hyperlipidemia) 11/30/2015    RA (rheumatoid arthritis) [facility] 11/30/2015    Erectile dysfunction 11/30/2015    Pulmonary nodule 11/30/2015    Weight loss, unintentional 08/28/2019     Past Medical History:   Diagnosis Date    Adverse effect of anesthesia     Chronic obstructive pulmonary disease ([facility]     Full dentures     History of skin cancer     Lung collapse 2012     Note: Patient denies any increase of symptoms with cough, sneeze or valsalva. Patient denies any saddle paresthesia or bowel/bladder deficits. Current Medications:    Current Outpatient Medications:     methylPREDNISolone (MEDROL DOSEPACK) 4 mg tablet, Take 1 Tablet by mouth Specific Days and Specific Times. , Disp: 1 Dose Pack, Rfl: 0    rosuvastatin (CRESTOR) 20 mg tablet, TAKE ONE TABLET BY MOUTH EACH NIGHT AT BEDTIME, Disp: 90 Tablet, Rfl: 0    phosphorated carbohydrate (Emetrol) soln solution, take as needed, Disp: , Rfl:     amLODIPine (NORVASC) 5 mg tablet, Take 1 Tablet by mouth daily. , Disp: 90 Tablet, Rfl: 1    triamcinolone acetonide (KENALOG) 0.1 % topical cream, APPLY TO AFFECTED AREA TWICE A DAY (Patient not taking: Reported on 4/6/2022), Disp: , Rfl:     ketoconazole (NIZORAL) 2 % shampoo, USE TO SHAMPOO DAILY, Disp: , Rfl:     fluticasone propionate (CUTIVATE) 0.05 % topical cream, APPLY TO THE AFFECTED AREAS TWICE DAILY AS NEEDED, Disp: , Rfl:     Humira,CF, Pen 40 mg/0.4 mL injection pen, Inject 40mg (ONE pen) UNDER THE SKIN every TWO WEEKS, Disp: , Rfl:     hydroxychloroquine (PLAQUENIL) 200 mg tablet, Take 200 mg by mouth two (2) times a day., Disp: , Rfl:     aspirin delayed-release 81 mg tablet, Take 1 Tab by mouth daily. , Disp: 1 Tab, Rfl: 0      Ambulatory/Rehab Services H2 Model Falls Risk Assessment    Risk Factors:       (1)  Gender [Male] Ability to Rise from Chair:       (0)  Ability to rise in a single movement    Falls Prevention Plan:       No modifications necessary   Total: (5 or greater = High Risk): 1    ©2010 [facility] of [employer]. All Rights Reserved. [facility] Patent #[id number]. Federal Law prohibits the replication, distribution or use without written permission from [facility] of [address] [place] [address]       Date Last Reviewed:  4/19/2022   Number of Personal Factors/Comorbidities that affect the Plan of Care: 1-2: MODERATE COMPLEXITY   EXAMINATION:   Observation/Orthostatic Postural Assessment: Forward Head and Rounded Shoulders  Palpation:          Increased Tenderness to lower lumbar spine. And B extensor polllicis longus, ECR B.      ROM:            AROM/PROM       RIGHT LEFT   Knee Extension  [facility]  WFL   Knee Flexion  WFL  WFL   Hip Flexion  Limited HS flexibility  Limited flexiblity HS   Hip Abduction         Lumbar ROM   *Hands to knees only \"I'm tight and this is about it\" ---leaning backwards is painful just to neutral.  SB to R painful and comparable limited to 25 deg B,  Rotation WFL bilaterally. 4/5 UE MMT overall. Cervical ROM 20 deg SB RIGHT, 20 DEG SB LEFT, 70 deg flexion, 0 deg extension.     Strength:      RIGHT LEFT   Knee Flexion (L5-S2)  5/5  5/5   Knee Extension (L3, L4)  4+/5  4+/5   Hip Flexion (L1, L2)  5/5  5/5   Hip Extension  4/5  4+/5   Hip Abduction (L5, S1)  4/5  4/5   Ankle Dorsiflexion (L4)  5/5  4+/5   Great Toe Extension (L5)  5/5  5/5   Ankle Plantar Flexion (S1-S2)  5/5  5/5   2    Special Tests:  Lumbar:  Webster's Sign: Positive   SI Joint:  SI Compression: Negative  SI Distraction: Negative  Thigh Thrust: Negative   Hip:  Scouring:Positive  Fabers:Positive     Reflex Testing:    Location Left Right   Patellar (L4) normal normal   Achilles (S1) normal normal       Neurological Screen:    RADIATING SYMPTOMS: No localised to central paraspinals  Upper motor [place] screen         Clonus: Negative         Babinski: Negative    Functional Mobility:  Affecting participation in basic ADLs and functional tasks. Balance and Mobility:    Test Result   Timed up and Go <10 Seconds          Body Structures Involved:  1. Bones  2. Joints  3. Muscles  4. Ligaments Body Functions Affected:  1. Sensory/Pain  2. Neuromusculoskeletal  3. Movement Related Activities and Participation Affected:  1. Mobility  2.  Self Care   Number of elements that affect the Plan of Care: 4+: HIGH COMPLEXITY   CLINICAL PRESENTATION:   Presentation: Stable and uncomplicated: LOW COMPLEXITY   CLINICAL DECISION MAKING:      Use of outcome tool(s) and clinical judgement create a POC that gives a: Clear prediction of patient's progress: LOW COMPLEXITY     See associated treatment note for treatment provided today      Future Appointments   Date Time Provider [facility]   4/19/2022  2:30 PM Flo Gomez DPT [facility]   4/28/2022  8:00 AM Suzanne Cooley MD [facility]   7/25/2022  9:00 AM Shira Giron MD [facility]   9/23/2022  2:53 AM SFE CT 16 SLICE UNIT 1 SFERCT [facility]   11/3/2022  8:40 AM Oneal Chamorro   Jesus Agee DPT

## 2022-04-19 NOTE — PROGRESS NOTES
Vish Kenney  : 1950  Payor: Carson Harrison / Plan: [address] / Product Type: PPO /  [address]  at [place] [address] Therapy  [address], [address]  [phone]   [phone]                                                            Jose A Allan*      OUTPATIENT PHYSICAL THERAPY: Daily Treatment Note 2022 Visit Count:  1    Tx Diagnosis:  Stiffness of left hand, not elsewhere classified (M25.642)  Stiffness of right hand, not elsewhere classified (M25.641)  Low back pain (M54.5)  Lumbago with Sciatica Right side (M54.41)  Lumbago with Sciatica Left side (M54.42)  Muscle Weakness, Generalized (M62.81)          Pre-treatment Symptoms/Complaints: See Initial Eval Dated 22 for more details. Pain: Initial:6/10  Medications Last Reviewed:  2022     Post Session: 5/10   Updated Objective Findings: See Initial Eval for more details. TREATMENT:   THERAPEUTIC EXERCISE: (15 minutes):  Exercises per grid below to improve mobility, strength and balance. Required minimal visual, verbal and manual cues to promote proper body alignment and promote proper body posture. Progressed resistance and complexity of movement as indicated. Date:  2022 Date:   Date:     Activity/Exercise Parameters Parameters Parameters   Education HEP, POC, PT goals, anatomy/pathology     Bridges 10x10\"     LTR 10x10\"     Hamstring stretch 30\"X3     NuStep      Clamshells      HRs      Cervical ROM - Rows                MANUAL THERAPY: (10 minutes): Joint mobilization, Soft tissue mobilization was utilized and necessary because of the patient's restricted joint motion and restricted motion of soft tissue mobility. Date  2022    Technique Used Grade  Level # Time(s) Effect while being performed   STM lower lumbar paraspinals.                                                               HEP Log Date    2022   2.  2022 3. 4/19/2022   4.    5.           [employer] Portal  Treatment/Session Summary:    Response to Treatment: Pt demonstrated understanding of POC and initial HEP. No increase in pain or adverse reactions. Communication/Consultation:  POC, HEP, PT goals, Faxed initial evaluation to MD.   Equipment provided today: HEP Handout   Recommendations/Intent for next treatment session:   Next visit will focus on Manual Therapy Core Stability Pain Science Education Dry Needling Quad strengthening Hip strengthening. Treatment Plan of Care Effective Dates: 4/19/2022 TO 7/18/2022 (90 days).   Frequency/Duration: 2 times a week for 90 Days             Total Treatment Billable Duration:   25  Rx plus Eval   PT Patient Time In/Time Out  Time In: 1430  Time Out: 8 [facility] Rossana Abdul DPT    Future Appointments   Date Time Provider Lea Erika   4/28/2022  8:00 AM Elsa Lim MD [facility]   7/25/2022  9:00 AM Jose Pearson MD [facility]   9/23/2022  5:33 AM SFE CT 16 SLICE UNIT 1 SFERCT SFE   11/3/2022  8:40 AM Gareth Simmonds, MD 1300

## 2022-04-27 ENCOUNTER — HOSPITAL ENCOUNTER (OUTPATIENT)
Dept: PHYSICAL THERAPY | Age: 72
Discharge: HOME | End: 2022-04-27
Payer: MEDICARE

## 2022-04-27 PROCEDURE — 97110 THERAPEUTIC EXERCISES: CPT

## 2022-04-27 PROCEDURE — 97140 MANUAL THERAPY 1/> REGIONS: CPT

## 2022-04-27 NOTE — PROGRESS NOTES
Vish Kenney  : 1950  Payor: Carson Harrison / Plan: [address] / Product Type: PPO /  [address] at [facility] Therapy  [address], [address]  [phone]   [phone]                                                            Gladys Morrow*      OUTPATIENT PHYSICAL THERAPY: Daily Treatment Note 2022 Visit Count:  2    Tx Diagnosis:  Stiffness of left hand, not elsewhere classified (M25.642)  Stiffness of right hand, not elsewhere classified (M25.641)  Low back pain (M54.5)  Lumbago with Sciatica Right side (M54.41)  Lumbago with Sciatica Left side (M54.42)  Muscle Weakness, Generalized (M62.81)          Pre-treatment Symptoms/Complaints: See Initial Eval Dated 22 for more details. Patient reports still having discomfort in his back and mainly wrist.   Pain: Initial:6/10  Medications Last Reviewed:  2022     Post Session: 5/10   Updated Objective Findings: See Initial Eval for more details. TREATMENT:   THERAPEUTIC EXERCISE: (30 minutes):  Exercises per grid below to improve mobility, strength and balance. Required minimal visual, verbal and manual cues to promote proper body alignment and promote proper body posture. Progressed resistance and complexity of movement as indicated. Date:  2022 Date:   Date:     Activity/Exercise Parameters Parameters Parameters   Education HEP, POC, PT goals, anatomy/pathology     Bridges 10x10\"     LTR 10x10\"     Hamstring stretch 30\"X3     NuStep X 10 min     Clamshells X 30 red TB     HRs      Cervical ROM - Rows X 30 red TB     palloff press 2 x 10 red TB     Marching Standing X 30     SLR 2 x 15     Standing hip abd 2 x 15               MANUAL THERAPY: (15 minutes): Joint mobilization, Soft tissue mobilization was utilized and necessary because of the patient's restricted joint motion and restricted motion of soft tissue mobility.         Date  2022 Technique Used Grade  Level # Time(s) Effect while being performed   STM lower lumbar paraspinals. HEP Log Date    4/27/2022   2.  4/27/2022   3. 4/27/2022   4.    5.           [employer] Portal  Treatment/Session Summary:    Response to Treatment: Pt demonstrated understanding of POC and initial HEP. No increase in pain or adverse reactions. Patient tolerated treatment with mild discomfort seems to gain good relief in back following treatment. Instructed patient to continue home exercises as directed. Communication/Consultation:  POC, HEP, PT goals, Faxed initial evaluation to MD.   Equipment provided today: HEP Handout   Recommendations/Intent for next treatment session:   Next visit will focus on Manual Therapy Core Stability Pain Science Education Dry Needling Quad strengthening Hip strengthening. Treatment Plan of Care Effective Dates: 4/27/2022 TO 7/18/2022 (90 days).   Frequency/Duration: 2 times a week for 90 Days             Total Treatment Billable Duration:   45  Rx   PT Patient Time In/Time Out  Time In: 0845  Time Out: [address], PTA    Future Appointments   Date Time Provider Lea James   4/28/2022  8:00 AM Stas Ayala MD [facility]   5/4/2022  8:45 AM Lyly Fitzgerald DPT [facility]   5/11/2022  8:45 AM Lyly Fitzgerald DPT [facility]   7/25/2022  9:00 AM Jefferson Stallworth MD [facility]   9/23/2022  6:09 AM SFE CT 16 SLICE UNIT 1 SFERCT E   11/3/2022  8:40 AM Nando Tripp MD [address] [place] [address]

## 2022-05-04 ENCOUNTER — HOSPITAL ENCOUNTER (OUTPATIENT)
Dept: PHYSICAL THERAPY | Age: 72
Discharge: HOME | End: 2022-05-04
Payer: MEDICARE

## 2022-05-04 PROCEDURE — 97140 MANUAL THERAPY 1/> REGIONS: CPT

## 2022-05-04 PROCEDURE — 97110 THERAPEUTIC EXERCISES: CPT

## 2022-05-04 NOTE — PROGRESS NOTES
Vish Kenney  : 1950  Payor: Carson Harrison / Plan: [address] / Product Type: PPO /  Gumarorelyn Apgar at [facility]  [address], [facility], [address]  [phone]   [phone]                                                            Mattie Wynne*      OUTPATIENT PHYSICAL THERAPY: Daily Treatment Note 2022 Visit Count:  3    Tx Diagnosis:  Stiffness of left hand, not elsewhere classified (M25.642)  Stiffness of right hand, not elsewhere classified (M25.641)  Low back pain (M54.5)  Lumbago with Sciatica Right side (M54.41)  Lumbago with Sciatica Left side (M54.42)  Muscle Weakness, Generalized (M62.81)          Pre-treatment Symptoms/Complaints: See Initial Eval Dated 22 for more details. Patient states no pain to his wrists, and overall doing well. He has to work and overall finds improvement with PT   Pain: Initial: 2/10  Medications Last Reviewed:  2022     Post Session: 2/10   Updated Objective Findings: See Initial Eval for more details. TREATMENT:   THERAPEUTIC EXERCISE: (40 minutes):  Exercises per grid below to improve mobility, strength and balance. Required minimal visual, verbal and manual cues to promote proper body alignment and promote proper body posture. Progressed resistance and complexity of movement as indicated.      Date:  2022 Date:   Date:     Activity/Exercise Parameters Parameters Parameters   Education HEP, POC, PT goals, anatomy/pathology     Bridges 10x10\" 10x10\"    LTR 10x10\" 10x10\"    Hamstring stretch 30\"X3 Manual assist    NuStep X 10 min 10 minutes    Clamshells X 30 red TB NT    HRs  30X    Cervical ROM - Rows X 30 red TB 10X10\" Red    palloff press 2 x 10 red TB 10x10\" Red    Marching Standing X 30 20x    SLR 2 x 15     Standing hip abd 2 x 15 10x10\"              MANUAL THERAPY: (15 minutes): Joint mobilization, Soft tissue mobilization was utilized and necessary because of the patient's restricted joint motion and restricted motion of soft tissue mobility. Date  5/4/2022    Technique Used Grade  Level # Time(s) Effect while being performed   STM lower lumbar paraspinals. HEP Log Date 1.    5/4/2022   2.  5/4/2022   3. 5/4/2022   4.    5.           [employer] [facility]  Treatment/Session Summary:    Response to Treatment: Pt demonstrated understanding of POC and initial HEP. No increase in pain or adverse reactions. No difficulty with wrists throughout session--overall showing progress. No pain increase throughout. Communication/Consultation:  POC, HEP, PT goals, Faxed initial evaluation to MD.   Equipment provided today: HEP Handout   Recommendations/Intent for next treatment session:   Next visit will focus on Manual Therapy Core Stability Pain Science Education Dry Needling Quad strengthening Hip strengthening. Treatment Plan of Care Effective Dates: 5/4/2022 TO 7/18/2022 (90 days).   Frequency/Duration: 2 times a week for 90 Days             Total Treatment Billable Duration:   54  Rx      DG JeromeT, COMT    Future Appointments   Date Time Provider Lea James   5/11/2022  8:45 AM Patricia Griggs DPT [facility]   6/29/2022  8:00 AM Brian Loo MD [facility]   7/25/2022  9:00 AM Joshua Roper MD [facility]   9/23/2022  1:50 AM SFE CT 16 SLICE UNIT 1 SFERCT [facility]   11/3/2022  8:40 AM Rosaline Yates MD [address] [facility]

## 2022-05-11 ENCOUNTER — HOSPITAL ENCOUNTER (OUTPATIENT)
Dept: PHYSICAL THERAPY | Age: 72
End: 2022-05-11
Payer: MEDICARE

## 2022-05-25 DIAGNOSIS — I10 ESSENTIAL (PRIMARY) HYPERTENSION: ICD-10-CM

## 2022-05-25 RX ORDER — AMLODIPINE BESYLATE 5 MG/1
TABLET ORAL
Qty: 90 TABLET | Refills: 0 | Status: SHIPPED | OUTPATIENT
Start: 2022-05-25 | End: 2022-08-24

## 2022-07-01 DIAGNOSIS — I25.10 ATHEROSCLEROTIC HEART DISEASE OF NATIVE CORONARY ARTERY WITHOUT ANGINA PECTORIS: ICD-10-CM

## 2022-07-01 DIAGNOSIS — E78.2 MIXED HYPERLIPIDEMIA: ICD-10-CM

## 2022-07-01 RX ORDER — ROSUVASTATIN CALCIUM 20 MG/1
TABLET, COATED ORAL
Qty: 90 TABLET | Refills: 0 | Status: SHIPPED | OUTPATIENT
Start: 2022-07-01 | End: 2022-09-26

## 2022-07-25 ENCOUNTER — OFFICE VISIT (OUTPATIENT)
Dept: INTERNAL MEDICINE CLINIC | Facility: CLINIC | Age: 72
End: 2022-07-25
Payer: MEDICARE

## 2022-07-25 VITALS
DIASTOLIC BLOOD PRESSURE: 82 MMHG | HEART RATE: 86 BPM | HEIGHT: 70 IN | BODY MASS INDEX: 21.19 KG/M2 | WEIGHT: 148 LBS | TEMPERATURE: 98.2 F | OXYGEN SATURATION: 97 % | SYSTOLIC BLOOD PRESSURE: 128 MMHG

## 2022-07-25 DIAGNOSIS — E78.2 MIXED HYPERLIPIDEMIA: ICD-10-CM

## 2022-07-25 DIAGNOSIS — I10 ESSENTIAL HYPERTENSION: ICD-10-CM

## 2022-07-25 DIAGNOSIS — M05.79 RHEUMATOID ARTHRITIS INVOLVING MULTIPLE SITES WITH POSITIVE RHEUMATOID FACTOR (HCC): ICD-10-CM

## 2022-07-25 DIAGNOSIS — I25.84 CORONARY ARTERY DISEASE DUE TO CALCIFIED CORONARY LESION: Primary | ICD-10-CM

## 2022-07-25 DIAGNOSIS — I25.10 CORONARY ARTERY DISEASE DUE TO CALCIFIED CORONARY LESION: Primary | ICD-10-CM

## 2022-07-25 DIAGNOSIS — I25.84 CORONARY ARTERY DISEASE DUE TO CALCIFIED CORONARY LESION: ICD-10-CM

## 2022-07-25 DIAGNOSIS — Z12.5 PROSTATE CANCER SCREENING: ICD-10-CM

## 2022-07-25 DIAGNOSIS — I25.10 CORONARY ARTERY DISEASE DUE TO CALCIFIED CORONARY LESION: ICD-10-CM

## 2022-07-25 PROBLEM — R06.09 DYSPNEA ON EXERTION: Status: RESOLVED | Noted: 2019-08-28 | Resolved: 2022-07-25

## 2022-07-25 PROBLEM — D36.7 DERMOID CYST OF LEFT LOWER EXTREMITY: Status: RESOLVED | Noted: 2019-08-22 | Resolved: 2022-07-25

## 2022-07-25 LAB
ALBUMIN SERPL-MCNC: 4.1 G/DL (ref 3.2–4.6)
ALBUMIN/GLOB SERPL: 1 {RATIO} (ref 1.2–3.5)
ALP SERPL-CCNC: 76 U/L (ref 50–136)
ALT SERPL-CCNC: 31 U/L (ref 12–65)
ANION GAP SERPL CALC-SCNC: 8 MMOL/L (ref 7–16)
AST SERPL-CCNC: 33 U/L (ref 15–37)
BASOPHILS # BLD: 0.1 K/UL (ref 0–0.2)
BASOPHILS NFR BLD: 1 % (ref 0–2)
BILIRUB DIRECT SERPL-MCNC: 0.3 MG/DL
BILIRUB SERPL-MCNC: 0.7 MG/DL (ref 0.2–1.1)
BUN SERPL-MCNC: 15 MG/DL (ref 8–23)
CALCIUM SERPL-MCNC: 9.2 MG/DL (ref 8.3–10.4)
CHLORIDE SERPL-SCNC: 109 MMOL/L (ref 98–107)
CO2 SERPL-SCNC: 26 MMOL/L (ref 21–32)
CREAT SERPL-MCNC: 0.8 MG/DL (ref 0.8–1.5)
DIFFERENTIAL METHOD BLD: ABNORMAL
EOSINOPHIL # BLD: 0.3 K/UL (ref 0–0.8)
EOSINOPHIL NFR BLD: 4 % (ref 0.5–7.8)
ERYTHROCYTE [DISTWIDTH] IN BLOOD BY AUTOMATED COUNT: 13.3 % (ref 11.9–14.6)
GLOBULIN SER CALC-MCNC: 4.1 G/DL (ref 2.3–3.5)
GLUCOSE SERPL-MCNC: 66 MG/DL (ref 65–100)
HCT VFR BLD AUTO: 47.2 % (ref 41.1–50.3)
HGB BLD-MCNC: 15.9 G/DL (ref 13.6–17.2)
IMM GRANULOCYTES # BLD AUTO: 0 K/UL (ref 0–0.5)
IMM GRANULOCYTES NFR BLD AUTO: 0 % (ref 0–5)
LYMPHOCYTES # BLD: 1.4 K/UL (ref 0.5–4.6)
LYMPHOCYTES NFR BLD: 19 % (ref 13–44)
MCH RBC QN AUTO: 33.9 PG (ref 26.1–32.9)
MCHC RBC AUTO-ENTMCNC: 33.7 G/DL (ref 31.4–35)
MCV RBC AUTO: 100.6 FL (ref 79.6–97.8)
MONOCYTES # BLD: 0.9 K/UL (ref 0.1–1.3)
MONOCYTES NFR BLD: 12 % (ref 4–12)
NEUTS SEG # BLD: 4.9 K/UL (ref 1.7–8.2)
NEUTS SEG NFR BLD: 64 % (ref 43–78)
NRBC # BLD: 0 K/UL (ref 0–0.2)
PLATELET # BLD AUTO: 190 K/UL (ref 150–450)
PMV BLD AUTO: 10.2 FL (ref 9.4–12.3)
POTASSIUM SERPL-SCNC: 5.1 MMOL/L (ref 3.5–5.1)
PROT SERPL-MCNC: 8.2 G/DL (ref 6.3–8.2)
PSA SERPL-MCNC: 0.6 NG/ML
RBC # BLD AUTO: 4.69 M/UL (ref 4.23–5.6)
SODIUM SERPL-SCNC: 143 MMOL/L (ref 136–145)
TSH, 3RD GENERATION: 1.21 UIU/ML (ref 0.36–3.74)
WBC # BLD AUTO: 7.6 K/UL (ref 4.3–11.1)

## 2022-07-25 PROCEDURE — 3017F COLORECTAL CA SCREEN DOC REV: CPT | Performed by: INTERNAL MEDICINE

## 2022-07-25 PROCEDURE — 4004F PT TOBACCO SCREEN RCVD TLK: CPT | Performed by: INTERNAL MEDICINE

## 2022-07-25 PROCEDURE — 1123F ACP DISCUSS/DSCN MKR DOCD: CPT | Performed by: INTERNAL MEDICINE

## 2022-07-25 PROCEDURE — G8420 CALC BMI NORM PARAMETERS: HCPCS | Performed by: INTERNAL MEDICINE

## 2022-07-25 PROCEDURE — 99214 OFFICE O/P EST MOD 30 MIN: CPT | Performed by: INTERNAL MEDICINE

## 2022-07-25 PROCEDURE — G8427 DOCREV CUR MEDS BY ELIG CLIN: HCPCS | Performed by: INTERNAL MEDICINE

## 2022-07-25 ASSESSMENT — ANXIETY QUESTIONNAIRES
5. BEING SO RESTLESS THAT IT IS HARD TO SIT STILL: 0
1. FEELING NERVOUS, ANXIOUS, OR ON EDGE: 0
6. BECOMING EASILY ANNOYED OR IRRITABLE: 0
7. FEELING AFRAID AS IF SOMETHING AWFUL MIGHT HAPPEN: 0
3. WORRYING TOO MUCH ABOUT DIFFERENT THINGS: 0
2. NOT BEING ABLE TO STOP OR CONTROL WORRYING: 0
GAD7 TOTAL SCORE: 0
4. TROUBLE RELAXING: 0
IF YOU CHECKED OFF ANY PROBLEMS ON THIS QUESTIONNAIRE, HOW DIFFICULT HAVE THESE PROBLEMS MADE IT FOR YOU TO DO YOUR WORK, TAKE CARE OF THINGS AT HOME, OR GET ALONG WITH OTHER PEOPLE: NOT DIFFICULT AT ALL

## 2022-07-25 ASSESSMENT — PATIENT HEALTH QUESTIONNAIRE - PHQ9
SUM OF ALL RESPONSES TO PHQ QUESTIONS 1-9: 0
2. FEELING DOWN, DEPRESSED OR HOPELESS: 0
1. LITTLE INTEREST OR PLEASURE IN DOING THINGS: 0
SUM OF ALL RESPONSES TO PHQ9 QUESTIONS 1 & 2: 0
SUM OF ALL RESPONSES TO PHQ QUESTIONS 1-9: 0

## 2022-07-25 ASSESSMENT — ENCOUNTER SYMPTOMS
BLOOD IN STOOL: 0
SHORTNESS OF BREATH: 0

## 2022-07-25 NOTE — PROGRESS NOTES
Natalie Avendano M.D. Internal Medicine  [address], [address]  Phone: [phone]  Fax: [phone]    Hypertension  This is a chronic problem. The current episode started more than 1 year ago. The problem has been waxing and waning since onset. The problem is controlled. Pertinent negatives include no chest pain or shortness of breath. There are no associated agents to hypertension. Risk factors for coronary artery disease include male gender and dyslipidemia. Past treatments include calcium channel blockers. The current treatment provides significant improvement. Fide Monique is a 73 y.o. White (non-) male. Current Outpatient Medications   Medication Sig Dispense Refill    rosuvastatin (CRESTOR) 20 MG tablet TAKE 1 TAB BY MOUTH AT BEDTIME DAILY 90 tablet 0    amLODIPine (NORVASC) 5 MG tablet TAKE 1 TABLET BY MOUTH EVERY DAY 90 tablet 0    Adalimumab (HUMIRA PEN) 40 MG/0.4ML PNKT Inject 40mg (ONE pen) UNDER THE SKIN every TWO WEEKS      aspirin 81 MG EC tablet Take 81 mg by mouth daily      hydroxychloroquine (PLAQUENIL) 200 MG tablet Take 200 mg by mouth 2 times daily      triamcinolone (KENALOG) 0.1 % cream APPLY TO AFFECTED AREA TWICE A DAY       No current facility-administered medications for this visit. Allergies   Allergen Reactions    Meloxicam Other (See Comments)     Cause BP elevation     Past Medical History:   Diagnosis Date    Adverse effect of anesthesia     shortness of breath when waking, happened once and no other episodes after surgery since.      Chronic obstructive pulmonary disease (HCC)     mild    ED (erectile dysfunction)     Full dentures     GERD (gastroesophageal reflux disease)     seldom    History of skin cancer     non- melanoma skin cancer- removed, left hand    Lung collapse 2012    related to pneumonia caused by Remicade    RA (rheumatoid arthritis) (HCC)     hands, ankles and feet     Past Surgical History:   Procedure Laterality Date    CHEST SURGERY Left 2006    biopsy- lung nodule- benign per pt- yearly CT     CHEST SURGERY Left 2007    VAT (due to Remicade) , benign    CHEST SURGERY Right right    VAT (due to Remicade), due to fluid in lung, benign nodule    COLONOSCOPY  11/18/2019    ORTHOPEDIC SURGERY Bilateral 2013    removal of RA nodules     ORTHOPEDIC SURGERY Bilateral     repair due to collapsed feet     Social History     Tobacco Use    Smoking status: Every Day     Packs/day: 1.00     Types: Cigarettes    Smokeless tobacco: Never    Tobacco comments:     Quit smoking: decreased from 1 pk/day x 30 years   Substance Use Topics    Alcohol use: Yes     Alcohol/week: 5.0 standard drinks    Drug use: No     Family History   Problem Relation Age of Onset    Cancer Mother     Kidney Disease Father     Hypertension Father     Diabetes Father     Stroke Father     Diabetes Brother     Lung Disease Brother     Liver Disease Mother       Review of Systems   Respiratory:  Negative for shortness of breath. Cardiovascular:  Negative for chest pain. Gastrointestinal:  Negative for blood in stool. Physical Exam  Vitals and nursing note reviewed. Constitutional:       General: He is not in acute distress. Appearance: Normal appearance. He is not ill-appearing, toxic-appearing or diaphoretic. HENT:      Head: Normocephalic and atraumatic. Right Ear: External ear normal.      Left Ear: External ear normal.   Eyes:      General: No scleral icterus. Right eye: No discharge. Left eye: No discharge. Conjunctiva/sclera: Conjunctivae normal.   Cardiovascular:      Rate and Rhythm: Normal rate and regular rhythm. Heart sounds: Normal heart sounds. No murmur heard. No friction rub. No gallop. Pulmonary:      Effort: Pulmonary effort is normal. No respiratory distress. Breath sounds: Normal breath sounds. No stridor. No wheezing, rhonchi or rales.    Abdominal: General: Abdomen is flat. Bowel sounds are normal. There is no distension. Palpations: Abdomen is soft. There is no mass. Tenderness: There is no abdominal tenderness. There is no guarding or rebound. Musculoskeletal:      Right lower leg: No edema. Left lower leg: No edema. Skin:     General: Skin is warm and dry. Coloration: Skin is not jaundiced or pale. Findings: No erythema. Neurological:      General: No focal deficit present. Mental Status: He is alert and oriented to person, place, and time. Mental status is at baseline. Psychiatric:         Mood and Affect: Mood normal.         Behavior: Behavior normal.         Thought Content: Thought content normal.         Judgment: Judgment normal.      ASSESSMENT AND PLAN:     CAD: Ca Score 11/20/18 = 1035. NM Stress 12/21/18 = Normal perfusion; EF 65%. Lost to f/u  with Cardiology (Dr. Florencia Tomlin). Maintained on Asa. Risk factors are being medically modified per below. Taking medications w/o problems. Well controlled w/o angina or PLAZA. Continue Asa (No bleeding or falls). Continue risk factor modification per below. Recommend follow up with Dr. Guy Galarza. HTN: Taking Current regimen (Norvasc 5) w/o problems. Home BPs = 120s/70s. Well controlled. Continue current regimen. Asked to monitor BP at home, call me if it runs above 140/80, and bring in a log next time. HLD: Taking current regimen (Crestor 20) w/o problems. Well controlled. Continue current regimen. FLPs:  2/2012 Untreated = [183/109/50/122]. 11/23/21 on Crestor 20 = [134/41/82/47]. 7/25/22 on Crestor 20 = [  RA: Follows with Rheumatology (Dr. Kimmie Gutierrez) on Humira and Plaquenil. Well Controlled. Follow up with Rheumatology. HCM: Recommend tobacco cessation. Recommend staying UTD with Covid vaccinations. Colonoscopy: 11/18/19 by Dr. Alfaro Friends = 2 Polyps; 5 Year Repeat Rec (Per an office note).   Prostate:  PSA  1/9/13 = 0.5.  11/12/18 = 1.3.  3/16/21 = 0.4.  7/25/22 =   TDap: 7/2019. Flu: Elsewhere 2021. F/u: 4 Months. No planned labs at that visit. Brennon was seen today for hypertension and cholesterol problem. Diagnoses and all orders for this visit:    Coronary artery disease due to calcified coronary lesion  -     Basic Metabolic Panel; Future  -     CBC with Auto Differential; Future  -     Lipid Panel; Future  -     Hepatic Function Panel; Future  -     TSH; Future    Essential hypertension  -     Basic Metabolic Panel; Future  -     CBC with Auto Differential; Future  -     Lipid Panel; Future  -     Hepatic Function Panel; Future  -     TSH; Future    Mixed hyperlipidemia  -     Basic Metabolic Panel; Future  -     CBC with Auto Differential; Future  -     Lipid Panel; Future  -     Hepatic Function Panel; Future  -     TSH; Future    Rheumatoid arthritis involving multiple sites with positive rheumatoid factor ([facility] [address]  -     Basic Metabolic Panel; Future  -     CBC with Auto Differential; Future  -     Hepatic Function Panel; Future    Prostate cancer screening  -     PSA Screening;  Future

## 2022-07-25 NOTE — PATIENT INSTRUCTIONS
Patient Education        Deciding About Using Medicines To Quit Smoking  How can you decide about using medicines to quit smoking? What are the medicines you can use? Your doctor may prescribe varenicline (Chantix) or bupropion (Zyban). These medicines can help you cope with cravings for tobacco. They are pills thatdon't contain nicotine. You also can use nicotine replacement products. These do contain nicotine. There are many types. Gum and lozenges slowly release nicotine into your mouth. Patches stick to your skin. They slowly release nicotine into your bloodstream.  An inhaler has a messer that contains nicotine. You breathe in a puff of nicotine vapor through your mouth and throat. Nasal spray releases a mist that contains nicotine. What are key points about this decision? Using medicines can double your chances of quitting smoking. They can ease cravings and withdrawal symptoms. Getting counseling along with using medicine can raise your chances of quitting even more. If you smoke fewer than 5 cigarettes a day, you may not need medicines to help you quit smoking. These medicines have less nicotine than cigarettes. And by itself, nicotine is not nearly as harmful as smoking. The tars, carbon monoxide, and other toxic chemicals in tobacco cause the harmful effects. The side effects of nicotine replacement products depend on the type of product. For example, a patch can make your skin red and itchy. Medicines in pill form can make you sick to your stomach. They can also cause dry mouth and trouble sleeping. For most people, the side effects are not bad enough to make them stop using the products. Why might you choose to use medicines to quit smoking? You have tried on your own to stop smoking, but you were not able to stop. You smoke more than 5 cigarettes a day. You want to increase your chances of quitting smoking. You want to reduce your cravings and withdrawal symptoms.   You feel the benefits of medicine outweigh the side effects. Why might you choose not to use medicine? You want to try quitting on your own by stopping all at once (\"cold turkey\"). You want to cut back slowly on the number of cigarettes you smoke. You smoke fewer than 5 cigarettes a day. You do not like using medicine. You feel the side effects of medicines outweigh the benefits. You are worried about the cost of medicines. Your decision  Thinking about the facts and your feelings can help you make a decision that is right for you. Be sure you understand the benefits and risks of your options,and think about what else you need to do before you make the decision. Where can you learn more? Go to https://ComputimepeNeuroSave.GeoVax. org and sign in to your [employer] account. Enter A888 in the Click4Ride box to learn more about \"Deciding About Using Medicines To Quit Smoking. \"     If you do not have an account, please click on the \"Sign Up Now\" link. Current as of: October 28, 2021               Content Version: 13.3  © 2006-2022 [employer]. Care instructions adapted under license by [facility] ([facility]). If you have questions about a medical condition or this instruction, always ask your healthcare professional. [place] [address] any warranty or liability for your use of this information.

## 2022-07-26 LAB
CHOLEST SERPL-MCNC: 143 MG/DL
HDLC SERPL-MCNC: 79 MG/DL (ref 40–60)
HDLC SERPL: 1.8 {RATIO}
LDLC SERPL CALC-MCNC: 61.4 MG/DL
TRIGL SERPL-MCNC: 13 MG/DL (ref 35–150)
VLDLC SERPL CALC-MCNC: 2.6 MG/DL (ref 6–23)

## 2022-08-19 ENCOUNTER — OFFICE VISIT (OUTPATIENT)
Dept: CARDIOLOGY CLINIC | Age: 72
End: 2022-08-19
Payer: MEDICARE

## 2022-08-19 VITALS
DIASTOLIC BLOOD PRESSURE: 70 MMHG | HEART RATE: 82 BPM | SYSTOLIC BLOOD PRESSURE: 118 MMHG | BODY MASS INDEX: 21.05 KG/M2 | HEIGHT: 70 IN | WEIGHT: 147 LBS

## 2022-08-19 DIAGNOSIS — I25.84 CORONARY ARTERY DISEASE DUE TO CALCIFIED CORONARY LESION: Primary | ICD-10-CM

## 2022-08-19 DIAGNOSIS — E78.2 MIXED HYPERLIPIDEMIA: ICD-10-CM

## 2022-08-19 DIAGNOSIS — M05.79 RHEUMATOID ARTHRITIS INVOLVING MULTIPLE SITES WITH POSITIVE RHEUMATOID FACTOR (HCC): ICD-10-CM

## 2022-08-19 DIAGNOSIS — I10 ESSENTIAL HYPERTENSION: ICD-10-CM

## 2022-08-19 DIAGNOSIS — I25.10 CORONARY ARTERY DISEASE DUE TO CALCIFIED CORONARY LESION: Primary | ICD-10-CM

## 2022-08-19 PROCEDURE — G8428 CUR MEDS NOT DOCUMENT: HCPCS | Performed by: INTERNAL MEDICINE

## 2022-08-19 PROCEDURE — 99205 OFFICE O/P NEW HI 60 MIN: CPT | Performed by: INTERNAL MEDICINE

## 2022-08-19 PROCEDURE — 93000 ELECTROCARDIOGRAM COMPLETE: CPT | Performed by: INTERNAL MEDICINE

## 2022-08-19 PROCEDURE — G8420 CALC BMI NORM PARAMETERS: HCPCS | Performed by: INTERNAL MEDICINE

## 2022-08-19 NOTE — PROGRESS NOTES
[address], [address] [place] [address]  PHONE: [phone]     SUBJECTIVE: /HPI  Howard (1950) is a 73 y.o. male seen for a follow up visit regarding the following:  Specialty Problems                  Cardiology Problems     Essential hypertension           Mixed hyperlipidemia           Coronary artery disease due to calcified coronary lesion            Complains of pain between left neck and shoulder. Pain is relieved by pressure. Also complains of back pain. Dyspnea when climbing several flights of stairs carrying pizza. No chest pain. No palpitations. Patient denies syncope. States they are taking meds. Maintains a normal level of activity for them without symptoms. No dizziness or lightheadedness. All above conditions stable. Patient is a current smoker. Advised smoking cessation. Past Medical History, Past Surgical History, Family history, Social History, and Medications were all reviewed with the patient today and updated as necessary. Allergies   Allergen Reactions    Meloxicam Other (See Comments)       Cause BP elevation      Past Medical History        Past Medical History:   Diagnosis Date    Adverse effect of anesthesia       shortness of breath when waking, happened once and no other episodes after surgery since.     Chronic obstructive pulmonary disease (HCC)       mild    ED (erectile dysfunction)      Full dentures      GERD (gastroesophageal reflux disease)       seldom    History of skin cancer       non- melanoma skin cancer- removed, left hand    Lung collapse 2012     related to pneumonia caused by Remicade    RA (rheumatoid arthritis) ([facility] [address]       hands, ankles and feet         Past Surgical History         Past Surgical History:   Procedure Laterality Date    CHEST SURGERY Left 2006     biopsy- lung nodule- benign per pt- yearly CT    CHEST SURGERY Left 2007     VAT (due to Remicade) , benign    CHEST SURGERY Right right     VAT (due to Remicade), due to fluid in lung, benign nodule    COLONOSCOPY   11/18/2019    ORTHOPEDIC SURGERY Bilateral 2013     removal of RA nodules    ORTHOPEDIC SURGERY Bilateral       repair due to collapsed feet         Family History         Family History   Problem Relation Age of Onset    Cancer Mother      Kidney Disease Father      Hypertension Father      Diabetes Father      Stroke Father      Diabetes Brother      Lung Disease Brother      Liver Disease Mother           Social History            Tobacco Use    Smoking status: Every Day       Packs/day: 1.00       Types: Cigarettes    Smokeless tobacco: Never    Tobacco comments:       Quit smoking: decreased from 1 pk/day x 30 years   Substance Use Topics    Alcohol use: Yes       Alcohol/week: 5.0 standard drinks        Current Medication      Current Outpatient Medications:    rosuvastatin (CRESTOR) 20 MG tablet, TAKE 1 TAB BY MOUTH AT BEDTIME DAILY, Disp: 90 tablet, Rfl: 0    amLODIPine (NORVASC) 5 MG tablet, TAKE 1 TABLET BY MOUTH EVERY DAY, Disp: 90 tablet, Rfl: 0    Adalimumab (HUMIRA PEN) 40 MG/0.4ML PNKT, Inject 80 (ONE pen) UNDER THE SKIN every TWO WEEKS, Disp: , Rfl:    aspirin 81 MG EC tablet, Take 81 mg by mouth daily, Disp: , Rfl:    hydroxychloroquine (PLAQUENIL) 200 MG tablet, Take 400 mg by mouth daily, Disp: , Rfl:    triamcinolone (KENALOG) 0.1 % cream, daily, Disp: , Rfl:        ROS:  Review of Systems - General ROS: negative for - chills, fatigue or fever  Hematological and Lymphatic ROS: negative for - bleeding problems, bruising or jaundice  Respiratory ROS: negative for - wheezing  Cardiovascular ROS: no chest pain or dyspnea on exertion  Gastrointestinal ROS: no abdominal pain, change in bowel habits, or black or bloody stools  Neurological ROS: no TIA or stroke symptoms  All other systems negative.          Wt Readings from Last 3 Encounters:   08/19/22 147 lb (66.7 kg)   07/25/22 148 lb (67.1 kg)   04/28/22 151 lb (68.5 kg)          Temp Readings from Last 3 Encounters:   07/25/22 98.2 °F (36.8 °C) (Temporal)          BP Readings from Last 3 Encounters:   08/19/22 118/70   07/25/22 128/82   04/28/22 110/76          Pulse Readings from Last 3 Encounters:   08/19/22 82   07/25/22 86   04/28/22 79         PHYSICAL EXAM:  /70   Pulse 82   Ht 5' 10\" (1.778 m)   Wt 147 lb (66.7 kg)   BMI 21.09 kg/m²   Physical Examination: General appearance - alert, well appearing, and in no distress  Mental status - alert, oriented to person, place, and time  Eyes - pupils equal and reactive, extraocular eye movements intact  Neck/lymph - supple, no significant adenopathy  Chest/lungs - no tachypnea, retractions or cyanosis  Heart/CV - normal rate, regular rhythm, normal S1, S2, no murmurs, rubs, clicks or gallops  Abdomen/GI - soft, nontender, nondistended, no masses or organomegaly  Musculoskeletal - no joint tenderness, deformity or swelling  Extremities - peripheral pulses normal, no pedal edema, no clubbing or cyanosis  Skin - normal coloration and turgor, no rashes, no suspicious skin lesions noted     EKG: normal EKG, normal sinus rhythm, nonspecific ST and T waves changes.                                                     Medications reviewed and questions answered     Recent Results         Recent Results (from the past 672 hour(s))   PSA Screening     Collection Time: 07/25/22 10:00 AM   Result Value Ref Range     PSA 0.6 <4.0 ng/mL   TSH     Collection Time: 07/25/22 10:00 AM   Result Value Ref Range     TSH, 3RD GENERATION 1.210 0.358 - 3.740 uIU/mL   Hepatic Function Panel     Collection Time: 07/25/22 10:00 AM   Result Value Ref Range     Total Protein 8.2 6.3 - 8.2 g/dL     Albumin 4.1 3.2 - 4.6 g/dL     Globulin 4.1 (H) 2.3 - 3.5 g/dL     Albumin/Globulin Ratio 1.0 (L) 1.2 - 3.5       Total Bilirubin 0.7 0.2 - 1.1 MG/DL     Bilirubin, Direct 0.3 <0.4 MG/DL     Alk Phosphatase 76 50 - 136 U/L     AST 33 15 - 37 U/L ALT 31 12 - 65 U/L   Lipid Panel     Collection Time: 07/25/22 10:00 AM   Result Value Ref Range     Cholesterol, Total 143 <200 MG/DL     Triglycerides 13 (L) 35 - 150 MG/DL     HDL 79 (H) 40 - 60 MG/DL     LDL Calculated 61.4 <100 MG/DL     VLDL Cholesterol Calculated 2.6 (L) 6.0 - 23.0 MG/DL     Chol/HDL Ratio 1.8     CBC with Auto Differential     Collection Time: 07/25/22 10:00 AM   Result Value Ref Range     WBC 7.6 4.3 - 11.1 K/uL     RBC 4.69 4.23 - 5.6 M/uL     Hemoglobin 15.9 13.6 - 17.2 g/dL     Hematocrit 47.2 41.1 - 50.3 %     .6 (H) 79.6 - 97.8 FL     MCH 33.9 (H) 26.1 - 32.9 PG     MCHC 33.7 31.4 - 35.0 g/dL     RDW 13.3 11.9 - 14.6 %     Platelets 890 040 - 220 K/uL     MPV 10.2 9.4 - 12.3 FL     nRBC 0.00 0.0 - 0.2 K/uL     Differential Type AUTOMATED       Seg Neutrophils 64 43 - 78 %     Lymphocytes 19 13 - 44 %     Monocytes 12 4.0 - 12.0 %     Eosinophils % 4 0.5 - 7.8 %     Basophils 1 0.0 - 2.0 %     Immature Granulocytes 0 0.0 - 5.0 %     Segs Absolute 4.9 1.7 - 8.2 K/UL     Absolute Lymph # 1.4 0.5 - 4.6 K/UL     Absolute Mono # 0.9 0.1 - 1.3 K/UL     Absolute Eos # 0.3 0.0 - 0.8 K/UL     Basophils Absolute 0.1 0.0 - 0.2 K/UL     Absolute Immature Granulocyte 0.0 0.0 - 0.5 K/UL   Basic Metabolic Panel     Collection Time: 07/25/22 10:00 AM   Result Value Ref Range     Sodium 143 136 - 145 mmol/L     Potassium 5.1 3.5 - 5.1 mmol/L     Chloride 109 (H) 98 - 107 mmol/L     CO2 26 21 - 32 mmol/L     Anion Gap 8 7 - 16 mmol/L     Glucose 66 65 - 100 mg/dL     BUN 15 8 - 23 MG/DL     Creatinine 0.80 0.8 - 1.5 MG/DL     GFR African American >60 >60 ml/min/1.73m2     GFR Non-African American >60 >60 ml/min/1.73m2     Calcium 9.2 8.3 - 10.4 MG/DL               Lab Results   Component Value Date/Time     CHOL 143 07/25/2022 10:00 AM     HDL 79 07/25/2022 10:00 AM     VLDL 11 11/23/2021 08:21 AM         ASSESSMENT and PLAN  No follow-up provider specified.  is for  Specialty Problems Cardiology Problems     Essential hypertension           Mixed hyperlipidemia           Coronary artery disease due to calcified coronary lesion               PLAN:  Problem List Items Addressed This Visit                  Circulatory     Coronary artery disease due to calcified coronary lesion - Primary     Essential hypertension     Relevant Orders     EKG 12 Lead (Completed)          Other     Mixed hyperlipidemia     Rheumatoid arthritis involving multiple sites with positive rheumatoid factor (HCC)      HTN  /70 in office. Continue current medications. CAD  Patient had a CCS of [phone] 11/20/18. Subsequently had a stress echo 10/11/19 that was normal per patient. Unable to access actual record at this time. Lipid panel 7/25/22 was WNL. On crestor 20mg and asa. Continue medications. Advised smoking cessation. Patient has tried nicotine patches and gum in past.     Continue meds as below    Current Medication      Current Outpatient Medications:    rosuvastatin (CRESTOR) 20 MG tablet, TAKE 1 TAB BY MOUTH AT BEDTIME DAILY, Disp: 90 tablet, Rfl: 0    amLODIPine (NORVASC) 5 MG tablet, TAKE 1 TABLET BY MOUTH EVERY DAY, Disp: 90 tablet, Rfl: 0    Adalimumab (HUMIRA PEN) 40 MG/0.4ML PNKT, Inject 80 (ONE pen) UNDER THE SKIN every TWO WEEKS, Disp: , Rfl:    aspirin 81 MG EC tablet, Take 81 mg by mouth daily, Disp: , Rfl:    hydroxychloroquine (PLAQUENIL) 200 MG tablet, Take 400 mg by mouth daily, Disp: , Rfl:    triamcinolone (KENALOG) 0.1 % cream, daily, Disp: , Rfl:        Varghese Marks  8/19/2022  1:50 PM     Pt is instructed to follow all appropriate cardiac risk factor recommendations and to be compliant with meds and testing. Instructed to follow up appropriately and seek urgent medical care if acute or unstable issues arise.  Results of some tests may be viewed thru [address] but this does not substitute for follow up with MD. If follow up is not scheduled pt is instructed to call for follow up Erlinda Galeano MD Physician       1:31 PM       Copy           Expand All Collapse All  [facility]  [address] [facility], [place], [address]  PHONE: [phone]     SUBJECTIVE: /HPI  Zain Lewis (1950) is a 73 y.o. male seen for a follow up visit regarding the following:  Specialty Problems                  Cardiology Problems     Essential hypertension           Mixed hyperlipidemia           Coronary artery disease due to calcified coronary lesion            ***     Past Medical History, Past Surgical History, Family history, Social History, and Medications were all reviewed with the patient today and updated as necessary. Allergies   Allergen Reactions    Meloxicam Other (See Comments)       Cause BP elevation      Past Medical History        Past Medical History:   Diagnosis Date    Adverse effect of anesthesia       shortness of breath when waking, happened once and no other episodes after surgery since.     Chronic obstructive pulmonary disease (HCC)       mild    ED (erectile dysfunction)      Full dentures      GERD (gastroesophageal reflux disease)       seldom    History of skin cancer       non- melanoma skin cancer- removed, left hand    Lung collapse 2012     related to pneumonia caused by Remicade    RA (rheumatoid arthritis) ([facility] [address]       hands, ankles and feet         Past Surgical History         Past Surgical History:   Procedure Laterality Date    CHEST SURGERY Left 2006     biopsy- lung nodule- benign per pt- yearly CT    CHEST SURGERY Left 2007     VAT (due to Remicade) , benign    CHEST SURGERY Right right     VAT (due to Remicade), due to fluid in lung, benign nodule    COLONOSCOPY   11/18/2019    ORTHOPEDIC SURGERY Bilateral 2013     removal of RA nodules    ORTHOPEDIC SURGERY Bilateral       repair due to collapsed feet         Family History         Family History   Problem Relation Age of Onset    Cancer Mother      Kidney Disease Father      Hypertension Father      Diabetes Father      Stroke Father      Diabetes Brother      Lung Disease Brother      Liver Disease Mother           Social History            Tobacco Use    Smoking status: Every Day       Packs/day: 1.00       Types: Cigarettes    Smokeless tobacco: Never    Tobacco comments:       Quit smoking: decreased from 1 pk/day x 30 years   Substance Use Topics    Alcohol use: Yes       Alcohol/week: 5.0 standard drinks        Current Medication      Current Outpatient Medications:    rosuvastatin (CRESTOR) 20 MG tablet, TAKE 1 TAB BY MOUTH AT BEDTIME DAILY, Disp: 90 tablet, Rfl: 0    amLODIPine (NORVASC) 5 MG tablet, TAKE 1 TABLET BY MOUTH EVERY DAY, Disp: 90 tablet, Rfl: 0    Adalimumab (HUMIRA PEN) 40 MG/0.4ML PNKT, Inject 80 (ONE pen) UNDER THE SKIN every TWO WEEKS, Disp: , Rfl:    aspirin 81 MG EC tablet, Take 81 mg by mouth daily, Disp: , Rfl:    hydroxychloroquine (PLAQUENIL) 200 MG tablet, Take 400 mg by mouth daily, Disp: , Rfl:    triamcinolone (KENALOG) 0.1 % cream, daily, Disp: , Rfl:        ROS:  Review of Systems - General ROS: negative for - chills, fatigue or fever  Hematological and Lymphatic ROS: negative for - bleeding problems, bruising or jaundice  Respiratory ROS: {:128084}  Cardiovascular ROS: {:912900}  Gastrointestinal ROS: no abdominal pain, change in bowel habits, or black or bloody stools  Neurological ROS: no TIA or stroke symptoms  All other systems negative.          Wt Readings from Last 3 Encounters:   08/19/22 147 lb (66.7 kg)   07/25/22 148 lb (67.1 kg)   04/28/22 151 lb (68.5 kg)          Temp Readings from Last 3 Encounters:   07/25/22 98.2 °F (36.8 °C) (Temporal)          BP Readings from Last 3 Encounters:   08/19/22 118/70   07/25/22 128/82   04/28/22 110/76          Pulse Readings from Last 3 Encounters:   08/19/22 82   07/25/22 86   04/28/22 79         PHYSICAL EXAM:  /70   Pulse 82   Ht 5' 10\" (1.778 m)   Wt 147 lb (66.7 kg)   BMI 21.09 kg/m²   Physical Examination: General appearance - alert, well appearing, and in no distress  Mental status - alert, oriented to person, place, and time  Eyes - pupils equal and reactive, extraocular eye movements intact  Neck/lymph - supple, no significant adenopathy  Chest/lungs - {:677134}  Heart/CV - {:568703}  Abdomen/GI - soft, nontender, nondistended, no masses or organomegaly  Musculoskeletal - no joint tenderness, deformity or swelling  Extremities - {:682540}  Skin - normal coloration and turgor, no rashes, no suspicious skin lesions noted     EKG: {ekg findings:343477}.                                                     Medications reviewed and questions answered     Recent Results         Recent Results (from the past 672 hour(s))   PSA Screening     Collection Time: 07/25/22 10:00 AM   Result Value Ref Range     PSA 0.6 <4.0 ng/mL   TSH     Collection Time: 07/25/22 10:00 AM   Result Value Ref Range     TSH, 3RD GENERATION 1.210 0.358 - 3.740 uIU/mL   Hepatic Function Panel     Collection Time: 07/25/22 10:00 AM   Result Value Ref Range     Total Protein 8.2 6.3 - 8.2 g/dL     Albumin 4.1 3.2 - 4.6 g/dL     Globulin 4.1 (H) 2.3 - 3.5 g/dL     Albumin/Globulin Ratio 1.0 (L) 1.2 - 3.5       Total Bilirubin 0.7 0.2 - 1.1 MG/DL     Bilirubin, Direct 0.3 <0.4 MG/DL     Alk Phosphatase 76 50 - 136 U/L     AST 33 15 - 37 U/L     ALT 31 12 - 65 U/L   Lipid Panel     Collection Time: 07/25/22 10:00 AM   Result Value Ref Range     Cholesterol, Total 143 <200 MG/DL     Triglycerides 13 (L) 35 - 150 MG/DL     HDL 79 (H) 40 - 60 MG/DL     LDL Calculated 61.4 <100 MG/DL     VLDL Cholesterol Calculated 2.6 (L) 6.0 - 23.0 MG/DL     Chol/HDL Ratio 1.8     CBC with Auto Differential     Collection Time: 07/25/22 10:00 AM   Result Value Ref Range     WBC 7.6 4.3 - 11.1 K/uL     RBC 4.69 4.23 - 5.6 M/uL     Hemoglobin 15.9 13.6 - 17.2 g/dL     Hematocrit 47.2 41.1 - 50.3 %     .6 (H) 79.6 - 97.8 FL     MCH 33.9 (H) 26.1 - 32.9 PG     MCHC 33.7 31.4 - 35.0 g/dL     RDW 13.3 11.9 - 14.6 %     Platelets 340 687 - 352 K/uL     MPV 10.2 9.4 - 12.3 FL     nRBC 0.00 0.0 - 0.2 K/uL     Differential Type AUTOMATED       Seg Neutrophils 64 43 - 78 %     Lymphocytes 19 13 - 44 %     Monocytes 12 4.0 - 12.0 %     Eosinophils % 4 0.5 - 7.8 %     Basophils 1 0.0 - 2.0 %     Immature Granulocytes 0 0.0 - 5.0 %     Segs Absolute 4.9 1.7 - 8.2 K/UL     Absolute Lymph # 1.4 0.5 - 4.6 K/UL     Absolute Mono # 0.9 0.1 - 1.3 K/UL     Absolute Eos # 0.3 0.0 - 0.8 K/UL     Basophils Absolute 0.1 0.0 - 0.2 K/UL     Absolute Immature Granulocyte 0.0 0.0 - 0.5 K/UL   Basic Metabolic Panel     Collection Time: 07/25/22 10:00 AM   Result Value Ref Range     Sodium 143 136 - 145 mmol/L     Potassium 5.1 3.5 - 5.1 mmol/L     Chloride 109 (H) 98 - 107 mmol/L     CO2 26 21 - 32 mmol/L     Anion Gap 8 7 - 16 mmol/L     Glucose 66 65 - 100 mg/dL     BUN 15 8 - 23 MG/DL     Creatinine 0.80 0.8 - 1.5 MG/DL     GFR African American >60 >60 ml/min/1.73m2     GFR Non-African American >60 >60 ml/min/1.73m2     Calcium 9.2 8.3 - 10.4 MG/DL               Lab Results   Component Value Date/Time     CHOL 143 07/25/2022 10:00 AM     HDL 79 07/25/2022 10:00 AM     VLDL 11 11/23/2021 08:21 AM         ASSESSMENT and PLAN  No follow-up provider specified.  is for  Specialty Problems                  Cardiology Problems     Essential hypertension           Mixed hyperlipidemia           Coronary artery disease due to calcified coronary lesion               PLAN:  Problem List Items Addressed This Visit                  Circulatory     Coronary artery disease due to calcified coronary lesion - Primary     Essential hypertension     Relevant Orders     EKG 12 Lead (Completed)          Other     Mixed hyperlipidemia     Rheumatoid arthritis involving multiple sites with positive rheumatoid factor (HCC)      ***     Continue meds as below    Current Medication Current Outpatient Medications:    rosuvastatin (CRESTOR) 20 MG tablet, TAKE 1 TAB BY MOUTH AT BEDTIME DAILY, Disp: 90 tablet, Rfl: 0    amLODIPine (NORVASC) 5 MG tablet, TAKE 1 TABLET BY MOUTH EVERY DAY, Disp: 90 tablet, Rfl: 0    Adalimumab (HUMIRA PEN) 40 MG/0.4ML PNKT, Inject 80 (ONE pen) UNDER THE SKIN every TWO WEEKS, Disp: , Rfl:    aspirin 81 MG EC tablet, Take 81 mg by mouth daily, Disp: , Rfl:    hydroxychloroquine (PLAQUENIL) 200 MG tablet, Take 400 mg by mouth daily, Disp: , Rfl:    triamcinolone (KENALOG) 0.1 % cream, daily, Disp: , Rfl:        Galo Cazares MD  8/19/2022  2:07 PM     Pt is instructed to follow all appropriate cardiac risk factor recommendations and to be compliant with meds and testing. Instructed to follow up appropriately and seek urgent medical care if acute or unstable issues arise.  Results of some tests may be viewed thru [address] but this does not substitute for follow up with MD. If follow up is not scheduled pt is instructed to call for follow up

## 2022-08-19 NOTE — PROGRESS NOTES
[address], [address] [place] [address], [address] [place] [address]  PHONE: [phone]    SUBJECTIVE: /HPI  Howard (1950) is a 73 y.o. male seen for a follow up visit regarding the following:   Specialty Problems          Cardiology Problems    Essential hypertension        Mixed hyperlipidemia        Coronary artery disease due to calcified coronary lesion         ***    Past Medical History, Past Surgical History, Family history, Social History, and Medications were all reviewed with the patient today and updated as necessary. Allergies   Allergen Reactions    Meloxicam Other (See Comments)     Cause BP elevation     Past Medical History:   Diagnosis Date    Adverse effect of anesthesia     shortness of breath when waking, happened once and no other episodes after surgery since.      Chronic obstructive pulmonary disease (HCC)     mild    ED (erectile dysfunction)     Full dentures     GERD (gastroesophageal reflux disease)     seldom    History of skin cancer     non- melanoma skin cancer- removed, left hand    Lung collapse 2012    related to pneumonia caused by Remicade    RA (rheumatoid arthritis) ([address]     hands, ankles and feet     Past Surgical History:   Procedure Laterality Date    CHEST SURGERY Left 2006    biopsy- lung nodule- benign per pt- yearly CT     CHEST SURGERY Left 2007    VAT (due to Remicade) , benign    CHEST SURGERY Right right    VAT (due to Remicade), due to fluid in lung, benign nodule    COLONOSCOPY  11/18/2019    ORTHOPEDIC SURGERY Bilateral 2013    removal of RA nodules     ORTHOPEDIC SURGERY Bilateral     repair due to collapsed feet     Family History   Problem Relation Age of Onset    Cancer Mother     Kidney Disease Father     Hypertension Father     Diabetes Father     Stroke Father     Diabetes Brother     Lung Disease Brother     Liver Disease Mother       Social History     Tobacco Use    Smoking status: Every Day     Packs/day: 1.00     Types: Cigarettes    Smokeless tobacco: Never    Tobacco comments:     Quit smoking: decreased from 1 pk/day x 30 years   Substance Use Topics    Alcohol use: Yes     Alcohol/week: 5.0 standard drinks       Current Outpatient Medications:     rosuvastatin (CRESTOR) 20 MG tablet, TAKE 1 TAB BY MOUTH AT BEDTIME DAILY, Disp: 90 tablet, Rfl: 0    amLODIPine (NORVASC) 5 MG tablet, TAKE 1 TABLET BY MOUTH EVERY DAY, Disp: 90 tablet, Rfl: 0    Adalimumab (HUMIRA PEN) 40 MG/0.4ML PNKT, Inject 80 (ONE pen) UNDER THE SKIN every TWO WEEKS, Disp: , Rfl:     aspirin 81 MG EC tablet, Take 81 mg by mouth daily, Disp: , Rfl:     hydroxychloroquine (PLAQUENIL) 200 MG tablet, Take 400 mg by mouth daily, Disp: , Rfl:     triamcinolone (KENALOG) 0.1 % cream, daily, Disp: , Rfl:     ROS:  Review of Systems - General ROS: negative for - chills, fatigue or fever  Hematological and Lymphatic ROS: negative for - bleeding problems, bruising or jaundice  Respiratory ROS: {:987669}  Cardiovascular ROS: {:588854}  Gastrointestinal ROS: no abdominal pain, change in bowel habits, or black or bloody stools  Neurological ROS: no TIA or stroke symptoms  All other systems negative.     Wt Readings from Last 3 Encounters:   08/19/22 147 lb (66.7 kg)   07/25/22 148 lb (67.1 kg)   04/28/22 151 lb (68.5 kg)     Temp Readings from Last 3 Encounters:   07/25/22 98.2 °F (36.8 °C) (Temporal)     BP Readings from Last 3 Encounters:   08/19/22 118/70   07/25/22 128/82   04/28/22 110/76     Pulse Readings from Last 3 Encounters:   08/19/22 82   07/25/22 86   04/28/22 79       PHYSICAL EXAM:  /70   Pulse 82   Ht 5' 10\" (1.778 m)   Wt 147 lb (66.7 kg)   BMI 21.09 kg/m²   Physical Examination: General appearance - alert, well appearing, and in no distress  Mental status - alert, oriented to person, place, and time  Eyes - pupils equal and reactive, extraocular eye movements intact  Neck/lymph - supple, no significant adenopathy  Chest/lungs - {:824661}  Heart/CV - {:098476}  Abdomen/GI - soft, nontender, nondistended, no masses or organomegaly  Musculoskeletal - no joint tenderness, deformity or swelling  Extremities - {:310452}  Skin - normal coloration and turgor, no rashes, no suspicious skin lesions noted    EKG: {ekg findings:526099}.          Medications reviewed and questions answered    Recent Results (from the past 672 hour(s))   PSA Screening    Collection Time: 07/25/22 10:00 AM   Result Value Ref Range    PSA 0.6 <4.0 ng/mL   TSH    Collection Time: 07/25/22 10:00 AM   Result Value Ref Range    TSH, 3RD GENERATION 1.210 0.358 - 3.740 uIU/mL   Hepatic Function Panel    Collection Time: 07/25/22 10:00 AM   Result Value Ref Range    Total Protein 8.2 6.3 - 8.2 g/dL    Albumin 4.1 3.2 - 4.6 g/dL    Globulin 4.1 (H) 2.3 - 3.5 g/dL    Albumin/Globulin Ratio 1.0 (L) 1.2 - 3.5      Total Bilirubin 0.7 0.2 - 1.1 MG/DL    Bilirubin, Direct 0.3 <0.4 MG/DL    Alk Phosphatase 76 50 - 136 U/L    AST 33 15 - 37 U/L    ALT 31 12 - 65 U/L   Lipid Panel    Collection Time: 07/25/22 10:00 AM   Result Value Ref Range    Cholesterol, Total 143 <200 MG/DL    Triglycerides 13 (L) 35 - 150 MG/DL    HDL 79 (H) 40 - 60 MG/DL    LDL Calculated 61.4 <100 MG/DL    VLDL Cholesterol Calculated 2.6 (L) 6.0 - 23.0 MG/DL    Chol/HDL Ratio 1.8     CBC with Auto Differential    Collection Time: 07/25/22 10:00 AM   Result Value Ref Range    WBC 7.6 4.3 - 11.1 K/uL    RBC 4.69 4.23 - 5.6 M/uL    Hemoglobin 15.9 13.6 - 17.2 g/dL    Hematocrit 47.2 41.1 - 50.3 %    .6 (H) 79.6 - 97.8 FL    MCH 33.9 (H) 26.1 - 32.9 PG    MCHC 33.7 31.4 - 35.0 g/dL    RDW 13.3 11.9 - 14.6 %    Platelets 724 078 - 931 K/uL    MPV 10.2 9.4 - 12.3 FL    nRBC 0.00 0.0 - 0.2 K/uL    Differential Type AUTOMATED      Seg Neutrophils 64 43 - 78 %    Lymphocytes 19 13 - 44 %    Monocytes 12 4.0 - 12.0 %    Eosinophils % 4 0.5 - 7.8 %    Basophils 1 0.0 - 2.0 %    Immature Granulocytes 0 0.0 - 5.0 %    Segs Absolute 4.9 1.7 - 8.2 K/UL    Absolute Lymph # 1.4 0.5 - 4.6 K/UL    Absolute Mono # 0.9 0.1 - 1.3 K/UL    Absolute Eos # 0.3 0.0 - 0.8 K/UL    Basophils Absolute 0.1 0.0 - 0.2 K/UL    Absolute Immature Granulocyte 0.0 0.0 - 0.5 K/UL   Basic Metabolic Panel    Collection Time: 07/25/22 10:00 AM   Result Value Ref Range    Sodium 143 136 - 145 mmol/L    Potassium 5.1 3.5 - 5.1 mmol/L    Chloride 109 (H) 98 - 107 mmol/L    CO2 26 21 - 32 mmol/L    Anion Gap 8 7 - 16 mmol/L    Glucose 66 65 - 100 mg/dL    BUN 15 8 - 23 MG/DL    Creatinine 0.80 0.8 - 1.5 MG/DL    GFR African American >60 >60 ml/min/1.73m2    GFR Non- >60 >60 ml/min/1.73m2    Calcium 9.2 8.3 - 10.4 MG/DL     Lab Results   Component Value Date/Time    CHOL 143 07/25/2022 10:00 AM    HDL 79 07/25/2022 10:00 AM    VLDL 11 11/23/2021 08:21 AM       ASSESSMENT and PLAN  No follow-up provider specified.  is for   Specialty Problems          Cardiology Problems    Essential hypertension        Mixed hyperlipidemia        Coronary artery disease due to calcified coronary lesion            PLAN:  Problem List Items Addressed This Visit          Circulatory    Coronary artery disease due to calcified coronary lesion - Primary    Essential hypertension    Relevant Orders    EKG 12 Lead (Completed)       Other    Mixed hyperlipidemia    Rheumatoid arthritis involving multiple sites with positive rheumatoid factor (HCC)      ***    Continue meds as below    Current Outpatient Medications:     rosuvastatin (CRESTOR) 20 MG tablet, TAKE 1 TAB BY MOUTH AT BEDTIME DAILY, Disp: 90 tablet, Rfl: 0    amLODIPine (NORVASC) 5 MG tablet, TAKE 1 TABLET BY MOUTH EVERY DAY, Disp: 90 tablet, Rfl: 0    Adalimumab (HUMIRA PEN) 40 MG/0.4ML PNKT, Inject 80 (ONE pen) UNDER THE SKIN every TWO WEEKS, Disp: , Rfl:     aspirin 81 MG EC tablet, Take 81 mg by mouth daily, Disp: , Rfl:     hydroxychloroquine (PLAQUENIL) 200 MG tablet, Take 400 mg by mouth daily, Disp: , Rfl:     triamcinolone (KENALOG) 0.1 % cream, daily, Disp: , Rfl:     Ruba Hameed MD  8/19/2022  2:07 PM    Pt is instructed to follow all appropriate cardiac risk factor recommendations and to be compliant with meds and testing. Instructed to follow up appropriately and seek urgent medical care if acute or unstable issues arise.  Results of some tests may be viewed thru [address] but this does not substitute for follow up with MD. If follow up is not scheduled pt is instructed to call for follow up

## 2022-08-19 NOTE — PROGRESS NOTES
[address], [address] [place] [address]  PHONE: [phone]    SUBJECTIVE: /HPI  Howard (1950) is a 73 y.o. male seen for a follow up visit regarding the following:   Specialty Problems          Cardiology Problems    Essential hypertension        Mixed hyperlipidemia        Coronary artery disease due to calcified coronary lesion         Complains of pain between left neck and shoulder. Pain is relieved by pressure. Also complains of back pain. Dyspnea when climbing several flights of stairs carrying pizza. History of elevated coronary calcium score of greater than 1000 this was several years ago patient had a normal stress echo at that time. This is certainly consistent with underlying coronary artery disease that was nonobstructive in nature but would warrant risk factor modification and appropriate medical therapy    No chest pain. No palpitations. Patient denies syncope. States they are taking meds. Maintains a normal level of activity for them without symptoms. No dizziness or lightheadedness. All above conditions stable. Patient is a current smoker. Advised smoking cessation. Past Medical History, Past Surgical History, Family history, Social History, and Medications were all reviewed with the patient today and updated as necessary. Allergies   Allergen Reactions    Meloxicam Other (See Comments)     Cause BP elevation     Past Medical History:   Diagnosis Date    Adverse effect of anesthesia     shortness of breath when waking, happened once and no other episodes after surgery since.      Chronic obstructive pulmonary disease (HCC)     mild    ED (erectile dysfunction)     Full dentures     GERD (gastroesophageal reflux disease)     seldom    History of skin cancer     non- melanoma skin cancer- removed, left hand    Lung collapse 2012    related to pneumonia caused by Remicade    RA (rheumatoid arthritis) (HCC)     hands, ankles and feet Past Surgical History:   Procedure Laterality Date    CHEST SURGERY Left 2006    biopsy- lung nodule- benign per pt- yearly CT     CHEST SURGERY Left 2007    VAT (due to Remicade) , benign    CHEST SURGERY Right right    VAT (due to Remicade), due to fluid in lung, benign nodule    COLONOSCOPY  11/18/2019    ORTHOPEDIC SURGERY Bilateral 2013    removal of RA nodules     ORTHOPEDIC SURGERY Bilateral     repair due to collapsed feet     Family History   Problem Relation Age of Onset    Cancer Mother     Kidney Disease Father     Hypertension Father     Diabetes Father     Stroke Father     Diabetes Brother     Lung Disease Brother     Liver Disease Mother       Social History     Tobacco Use    Smoking status: Every Day     Packs/day: 1.00     Types: Cigarettes    Smokeless tobacco: Never    Tobacco comments:     Quit smoking: decreased from 1 pk/day x 30 years   Substance Use Topics    Alcohol use:  Yes     Alcohol/week: 5.0 standard drinks       Current Outpatient Medications:     rosuvastatin (CRESTOR) 20 MG tablet, TAKE 1 TAB BY MOUTH AT BEDTIME DAILY, Disp: 90 tablet, Rfl: 0    amLODIPine (NORVASC) 5 MG tablet, TAKE 1 TABLET BY MOUTH EVERY DAY, Disp: 90 tablet, Rfl: 0    Adalimumab (HUMIRA PEN) 40 MG/0.4ML PNKT, Inject 80 (ONE pen) UNDER THE SKIN every TWO WEEKS, Disp: , Rfl:     aspirin 81 MG EC tablet, Take 81 mg by mouth daily, Disp: , Rfl:     hydroxychloroquine (PLAQUENIL) 200 MG tablet, Take 400 mg by mouth daily, Disp: , Rfl:     triamcinolone (KENALOG) 0.1 % cream, daily, Disp: , Rfl:     ROS:  Review of Systems - General ROS: negative for - chills, fatigue or fever  Hematological and Lymphatic ROS: negative for - bleeding problems, bruising or jaundice  Respiratory ROS: negative for - wheezing  Cardiovascular ROS: no chest pain or dyspnea on exertion  Gastrointestinal ROS: no abdominal pain, change in bowel habits, or black or bloody stools  Neurological ROS: no TIA or stroke symptoms  All other systems negative. Wt Readings from Last 3 Encounters:   08/19/22 147 lb (66.7 kg)   07/25/22 148 lb (67.1 kg)   04/28/22 151 lb (68.5 kg)     Temp Readings from Last 3 Encounters:   07/25/22 98.2 °F (36.8 °C) (Temporal)     BP Readings from Last 3 Encounters:   08/19/22 118/70   07/25/22 128/82   04/28/22 110/76     Pulse Readings from Last 3 Encounters:   08/19/22 82   07/25/22 86   04/28/22 79       PHYSICAL EXAM:  /70   Pulse 82   Ht 5' 10\" (1.778 m)   Wt 147 lb (66.7 kg)   BMI 21.09 kg/m²   Physical Examination: General appearance - alert, well appearing, and in no distress  Mental status - alert, oriented to person, place, and time  Eyes - pupils equal and reactive, extraocular eye movements intact  Neck/lymph - supple, no significant adenopathy  Chest/lungs - no tachypnea, retractions or cyanosis  Heart/CV - normal rate, regular rhythm, normal S1, S2, no murmurs, rubs, clicks or gallops  Abdomen/GI - soft, nontender, nondistended, no masses or organomegaly  Musculoskeletal - no joint tenderness, deformity or swelling  Extremities - peripheral pulses normal, no pedal edema, no clubbing or cyanosis  Skin - normal coloration and turgor, no rashes, no suspicious skin lesions noted    EKG: normal EKG, normal sinus rhythm, nonspecific ST and T waves changes.          Medications reviewed and questions answered    Recent Results (from the past 672 hour(s))   PSA Screening    Collection Time: 07/25/22 10:00 AM   Result Value Ref Range    PSA 0.6 <4.0 ng/mL   TSH    Collection Time: 07/25/22 10:00 AM   Result Value Ref Range    TSH, 3RD GENERATION 1.210 0.358 - 3.740 uIU/mL   Hepatic Function Panel    Collection Time: 07/25/22 10:00 AM   Result Value Ref Range    Total Protein 8.2 6.3 - 8.2 g/dL    Albumin 4.1 3.2 - 4.6 g/dL    Globulin 4.1 (H) 2.3 - 3.5 g/dL    Albumin/Globulin Ratio 1.0 (L) 1.2 - 3.5      Total Bilirubin 0.7 0.2 - 1.1 MG/DL    Bilirubin, Direct 0.3 <0.4 MG/DL    Alk Phosphatase 76 50 - 136 U/L    AST 33 15 - 37 U/L    ALT 31 12 - 65 U/L   Lipid Panel    Collection Time: 07/25/22 10:00 AM   Result Value Ref Range    Cholesterol, Total 143 <200 MG/DL    Triglycerides 13 (L) 35 - 150 MG/DL    HDL 79 (H) 40 - 60 MG/DL    LDL Calculated 61.4 <100 MG/DL    VLDL Cholesterol Calculated 2.6 (L) 6.0 - 23.0 MG/DL    Chol/HDL Ratio 1.8     CBC with Auto Differential    Collection Time: 07/25/22 10:00 AM   Result Value Ref Range    WBC 7.6 4.3 - 11.1 K/uL    RBC 4.69 4.23 - 5.6 M/uL    Hemoglobin 15.9 13.6 - 17.2 g/dL    Hematocrit 47.2 41.1 - 50.3 %    .6 (H) 79.6 - 97.8 FL    MCH 33.9 (H) 26.1 - 32.9 PG    MCHC 33.7 31.4 - 35.0 g/dL    RDW 13.3 11.9 - 14.6 %    Platelets 111 816 - 030 K/uL    MPV 10.2 9.4 - 12.3 FL    nRBC 0.00 0.0 - 0.2 K/uL    Differential Type AUTOMATED      Seg Neutrophils 64 43 - 78 %    Lymphocytes 19 13 - 44 %    Monocytes 12 4.0 - 12.0 %    Eosinophils % 4 0.5 - 7.8 %    Basophils 1 0.0 - 2.0 %    Immature Granulocytes 0 0.0 - 5.0 %    Segs Absolute 4.9 1.7 - 8.2 K/UL    Absolute Lymph # 1.4 0.5 - 4.6 K/UL    Absolute Mono # 0.9 0.1 - 1.3 K/UL    Absolute Eos # 0.3 0.0 - 0.8 K/UL    Basophils Absolute 0.1 0.0 - 0.2 K/UL    Absolute Immature Granulocyte 0.0 0.0 - 0.5 K/UL   Basic Metabolic Panel    Collection Time: 07/25/22 10:00 AM   Result Value Ref Range    Sodium 143 136 - 145 mmol/L    Potassium 5.1 3.5 - 5.1 mmol/L    Chloride 109 (H) 98 - 107 mmol/L    CO2 26 21 - 32 mmol/L    Anion Gap 8 7 - 16 mmol/L    Glucose 66 65 - 100 mg/dL    BUN 15 8 - 23 MG/DL    Creatinine 0.80 0.8 - 1.5 MG/DL    GFR African American >60 >60 ml/min/1.73m2    GFR Non- >60 >60 ml/min/1.73m2    Calcium 9.2 8.3 - 10.4 MG/DL     Lab Results   Component Value Date/Time    CHOL 143 07/25/2022 10:00 AM    HDL 79 07/25/2022 10:00 AM    VLDL 11 11/23/2021 08:21 AM       ASSESSMENT and PLAN  No follow-up provider specified.  is for   Specialty Problems          Cardiology Problems Essential hypertension        Mixed hyperlipidemia        Coronary artery disease due to calcified coronary lesion            PLAN:  Problem List Items Addressed This Visit          Circulatory    Coronary artery disease due to calcified coronary lesion - Primary    Essential hypertension    Relevant Orders    EKG 12 Lead (Completed)       Other    Mixed hyperlipidemia    Rheumatoid arthritis involving multiple sites with positive rheumatoid factor (HCC)      HTN  /70 in office. Continue current medications. CAD  Patient had a CCS of [phone] 11/20/18. Subsequently had a stress echo 10/11/19 that was normal per patient. Unable to access actual record at this time. Lipid panel 7/25/22 was WNL. On crestor 20mg and asa. Continue medications. Advised smoking cessation. Patient has tried nicotine patches and gum in past.    Continue meds as below    Current Outpatient Medications:     rosuvastatin (CRESTOR) 20 MG tablet, TAKE 1 TAB BY MOUTH AT BEDTIME DAILY, Disp: 90 tablet, Rfl: 0    amLODIPine (NORVASC) 5 MG tablet, TAKE 1 TABLET BY MOUTH EVERY DAY, Disp: 90 tablet, Rfl: 0    Adalimumab (HUMIRA PEN) 40 MG/0.4ML PNKT, Inject 80 (ONE pen) UNDER THE SKIN every TWO WEEKS, Disp: , Rfl:     aspirin 81 MG EC tablet, Take 81 mg by mouth daily, Disp: , Rfl:     hydroxychloroquine (PLAQUENIL) 200 MG tablet, Take 400 mg by mouth daily, Disp: , Rfl:     triamcinolone (KENALOG) 0.1 % cream, daily, Disp: , Rfl:     Terrence Hopkins  8/19/2022  1:50 PM    Pt is instructed to follow all appropriate cardiac risk factor recommendations and to be compliant with meds and testing. Instructed to follow up appropriately and seek urgent medical care if acute or unstable issues arise.  Results of some tests may be viewed thru [address] but this does not substitute for follow up with MD. If follow up is not scheduled pt is instructed to call for follow up

## 2022-08-22 NOTE — PROGRESS NOTES
[address], [address] [place] [address], [address] [place] [address]  PHONE: [phone]    SUBJECTIVE: /HPI  Howard (1950) is a 73 y.o. male seen for a follow up visit regarding the following:   Specialty Problems          Cardiology Problems    Essential hypertension        Mixed hyperlipidemia        Coronary artery disease due to calcified coronary lesion         Patient has a prior history of coronary artery disease as noted by elevated coronary calcium score. He has had prior noninvasive work-up showing no evidence of ischemic flow-limiting disease. He presents today for continued follow-up. He remains asymptomatic with no evidence of exertional chest pain. I do believe he though continues to use cigarettes which is likely has highest risk factor. Other such as hypertension and dyslipidemia are currently addressed    Past Medical History, Past Surgical History, Family history, Social History, and Medications were all reviewed with the patient today and updated as necessary. Allergies   Allergen Reactions    Meloxicam Other (See Comments)     Cause BP elevation     Past Medical History:   Diagnosis Date    Adverse effect of anesthesia     shortness of breath when waking, happened once and no other episodes after surgery since.      Chronic obstructive pulmonary disease (HCC)     mild    ED (erectile dysfunction)     Full dentures     GERD (gastroesophageal reflux disease)     seldom    History of skin cancer     non- melanoma skin cancer- removed, left hand    Lung collapse 2012    related to pneumonia caused by Remicade    RA (rheumatoid arthritis) ([facility] [address]     hands, ankles and feet     Past Surgical History:   Procedure Laterality Date    CHEST SURGERY Left 2006    biopsy- lung nodule- benign per pt- yearly CT     CHEST SURGERY Left 2007    VAT (due to Remicade) , benign    CHEST SURGERY Right right    VAT (due to Remicade), due to fluid in lung, benign nodule    COLONOSCOPY 11/18/2019    ORTHOPEDIC SURGERY Bilateral 2013    removal of RA nodules     ORTHOPEDIC SURGERY Bilateral     repair due to collapsed feet     Family History   Problem Relation Age of Onset    Cancer Mother     Kidney Disease Father     Hypertension Father     Diabetes Father     Stroke Father     Diabetes Brother     Lung Disease Brother     Liver Disease Mother       Social History     Tobacco Use    Smoking status: Every Day     Packs/day: 1.00     Types: Cigarettes    Smokeless tobacco: Never    Tobacco comments:     Quit smoking: decreased from 1 pk/day x 30 years   Substance Use Topics    Alcohol use: Yes     Alcohol/week: 5.0 standard drinks       Current Outpatient Medications:     rosuvastatin (CRESTOR) 20 MG tablet, TAKE 1 TAB BY MOUTH AT BEDTIME DAILY, Disp: 90 tablet, Rfl: 0    amLODIPine (NORVASC) 5 MG tablet, TAKE 1 TABLET BY MOUTH EVERY DAY, Disp: 90 tablet, Rfl: 0    Adalimumab (HUMIRA PEN) 40 MG/0.4ML PNKT, Inject 80 (ONE pen) UNDER THE SKIN every TWO WEEKS, Disp: , Rfl:     aspirin 81 MG EC tablet, Take 81 mg by mouth daily, Disp: , Rfl:     hydroxychloroquine (PLAQUENIL) 200 MG tablet, Take 400 mg by mouth daily, Disp: , Rfl:     triamcinolone (KENALOG) 0.1 % cream, daily, Disp: , Rfl:     ROS:  Review of Systems - General ROS: negative for - chills, fatigue or fever  Hematological and Lymphatic ROS: negative for - bleeding problems, bruising or jaundice  Respiratory ROS: no cough, shortness of breath, or wheezing  Cardiovascular ROS: no chest pain or dyspnea on exertion  Gastrointestinal ROS: no abdominal pain, change in bowel habits, or black or bloody stools  Neurological ROS: no TIA or stroke symptoms  All other systems negative.     Wt Readings from Last 3 Encounters:   08/19/22 147 lb (66.7 kg)   07/25/22 148 lb (67.1 kg)   04/28/22 151 lb (68.5 kg)     Temp Readings from Last 3 Encounters:   07/25/22 98.2 °F (36.8 °C) (Temporal)     BP Readings from Last 3 Encounters:   08/19/22 118/70 07/25/22 128/82   04/28/22 110/76     Pulse Readings from Last 3 Encounters:   08/19/22 82   07/25/22 86   04/28/22 79       PHYSICAL EXAM:  /70   Pulse 82   Ht 5' 10\" (1.778 m)   Wt 147 lb (66.7 kg)   BMI 21.09 kg/m²   Physical Examination: General appearance - alert, well appearing, and in no distress  Mental status - alert, oriented to person, place, and time  Eyes - pupils equal and reactive, extraocular eye movements intact  Neck/lymph - supple, no significant adenopathy  Chest/lungs - clear to auscultation, no wheezes, rales or rhonchi, symmetric air entry  Heart/CV - normal rate, regular rhythm, normal S1, S2, no murmurs, rubs, clicks or gallops  Abdomen/GI - soft, nontender, nondistended, no masses or organomegaly  Musculoskeletal - no joint tenderness, deformity or swelling  Extremities - peripheral pulses normal, no pedal edema, no clubbing or cyanosis  Skin - normal coloration and turgor, no rashes, no suspicious skin lesions noted    EKG: normal EKG, normal sinus rhythm, nonspecific ST and T waves changes.          Medications reviewed and questions answered    Recent Results (from the past 672 hour(s))   PSA Screening    Collection Time: 07/25/22 10:00 AM   Result Value Ref Range    PSA 0.6 <4.0 ng/mL   TSH    Collection Time: 07/25/22 10:00 AM   Result Value Ref Range    TSH, 3RD GENERATION 1.210 0.358 - 3.740 uIU/mL   Hepatic Function Panel    Collection Time: 07/25/22 10:00 AM   Result Value Ref Range    Total Protein 8.2 6.3 - 8.2 g/dL    Albumin 4.1 3.2 - 4.6 g/dL    Globulin 4.1 (H) 2.3 - 3.5 g/dL    Albumin/Globulin Ratio 1.0 (L) 1.2 - 3.5      Total Bilirubin 0.7 0.2 - 1.1 MG/DL    Bilirubin, Direct 0.3 <0.4 MG/DL    Alk Phosphatase 76 50 - 136 U/L    AST 33 15 - 37 U/L    ALT 31 12 - 65 U/L   Lipid Panel    Collection Time: 07/25/22 10:00 AM   Result Value Ref Range    Cholesterol, Total 143 <200 MG/DL    Triglycerides 13 (L) 35 - 150 MG/DL    HDL 79 (H) 40 - 60 MG/DL    LDL Calculated 61.4 <100 MG/DL    VLDL Cholesterol Calculated 2.6 (L) 6.0 - 23.0 MG/DL    Chol/HDL Ratio 1.8     CBC with Auto Differential    Collection Time: 07/25/22 10:00 AM   Result Value Ref Range    WBC 7.6 4.3 - 11.1 K/uL    RBC 4.69 4.23 - 5.6 M/uL    Hemoglobin 15.9 13.6 - 17.2 g/dL    Hematocrit 47.2 41.1 - 50.3 %    .6 (H) 79.6 - 97.8 FL    MCH 33.9 (H) 26.1 - 32.9 PG    MCHC 33.7 31.4 - 35.0 g/dL    RDW 13.3 11.9 - 14.6 %    Platelets 869 868 - 556 K/uL    MPV 10.2 9.4 - 12.3 FL    nRBC 0.00 0.0 - 0.2 K/uL    Differential Type AUTOMATED      Seg Neutrophils 64 43 - 78 %    Lymphocytes 19 13 - 44 %    Monocytes 12 4.0 - 12.0 %    Eosinophils % 4 0.5 - 7.8 %    Basophils 1 0.0 - 2.0 %    Immature Granulocytes 0 0.0 - 5.0 %    Segs Absolute 4.9 1.7 - 8.2 K/UL    Absolute Lymph # 1.4 0.5 - 4.6 K/UL    Absolute Mono # 0.9 0.1 - 1.3 K/UL    Absolute Eos # 0.3 0.0 - 0.8 K/UL    Basophils Absolute 0.1 0.0 - 0.2 K/UL    Absolute Immature Granulocyte 0.0 0.0 - 0.5 K/UL   Basic Metabolic Panel    Collection Time: 07/25/22 10:00 AM   Result Value Ref Range    Sodium 143 136 - 145 mmol/L    Potassium 5.1 3.5 - 5.1 mmol/L    Chloride 109 (H) 98 - 107 mmol/L    CO2 26 21 - 32 mmol/L    Anion Gap 8 7 - 16 mmol/L    Glucose 66 65 - 100 mg/dL    BUN 15 8 - 23 MG/DL    Creatinine 0.80 0.8 - 1.5 MG/DL    GFR African American >60 >60 ml/min/1.73m2    GFR Non- >60 >60 ml/min/1.73m2    Calcium 9.2 8.3 - 10.4 MG/DL     Lab Results   Component Value Date/Time    CHOL 143 07/25/2022 10:00 AM    HDL 79 07/25/2022 10:00 AM    VLDL 11 11/23/2021 08:21 AM       ASSESSMENT and PLAN  No follow-up provider specified.  is for   Specialty Problems          Cardiology Problems    Essential hypertension        Mixed hyperlipidemia        Coronary artery disease due to calcified coronary lesion            PLAN:  Problem List Items Addressed This Visit          Circulatory    Coronary artery disease due to calcified coronary lesion - Primary    Essential hypertension    Relevant Orders    EKG 12 Lead (Completed)       Other    Mixed hyperlipidemia    Rheumatoid arthritis involving multiple sites with positive rheumatoid factor (HCC)      Artery disease  Continue risk factor modification and have encouraged patient to consider stopping smoking. With no symptoms there is no need for an ischemic work-up at this time we can continue with medical therapy    Dyslipidemia  Appropriately addressed on moderate intensity statin therapy. Hypertension  Addressed on Norvasc. Follow-up for the above conditions in 6 months    Continue meds as below    Current Outpatient Medications:     rosuvastatin (CRESTOR) 20 MG tablet, TAKE 1 TAB BY MOUTH AT BEDTIME DAILY, Disp: 90 tablet, Rfl: 0    amLODIPine (NORVASC) 5 MG tablet, TAKE 1 TABLET BY MOUTH EVERY DAY, Disp: 90 tablet, Rfl: 0    Adalimumab (HUMIRA PEN) 40 MG/0.4ML PNKT, Inject 80 (ONE pen) UNDER THE SKIN every TWO WEEKS, Disp: , Rfl:     aspirin 81 MG EC tablet, Take 81 mg by mouth daily, Disp: , Rfl:     hydroxychloroquine (PLAQUENIL) 200 MG tablet, Take 400 mg by mouth daily, Disp: , Rfl:     triamcinolone (KENALOG) 0.1 % cream, daily, Disp: , Rfl:     Lolis Mckenzie MD  8/22/2022  6:43 AM    Pt is instructed to follow all appropriate cardiac risk factor recommendations and to be compliant with meds and testing. Instructed to follow up appropriately and seek urgent medical care if acute or unstable issues arise.  Results of some tests may be viewed thru [address] but this does not substitute for follow up with MD. If follow up is not scheduled pt is instructed to call for follow up

## 2022-08-24 DIAGNOSIS — I10 ESSENTIAL (PRIMARY) HYPERTENSION: ICD-10-CM

## 2022-08-24 RX ORDER — AMLODIPINE BESYLATE 5 MG/1
TABLET ORAL
Qty: 90 TABLET | Refills: 0 | Status: SHIPPED | OUTPATIENT
Start: 2022-08-24

## 2022-09-22 ENCOUNTER — HOSPITAL ENCOUNTER (OUTPATIENT)
Dept: CT IMAGING | Age: 72
Discharge: HOME | End: 2022-09-25
Payer: MEDICARE

## 2022-09-22 DIAGNOSIS — Z87.891 PERSONAL HISTORY OF TOBACCO USE, PRESENTING HAZARDS TO HEALTH: ICD-10-CM

## 2022-09-22 PROCEDURE — 71271 CT THORAX LUNG CANCER SCR C-: CPT

## 2022-09-23 ENCOUNTER — OFFICE VISIT (OUTPATIENT)
Dept: RHEUMATOLOGY | Age: 72
End: 2022-09-23
Payer: MEDICARE

## 2022-09-23 VITALS
SYSTOLIC BLOOD PRESSURE: 129 MMHG | HEART RATE: 81 BPM | WEIGHT: 145 LBS | DIASTOLIC BLOOD PRESSURE: 88 MMHG | BODY MASS INDEX: 20.76 KG/M2 | HEIGHT: 70 IN

## 2022-09-23 DIAGNOSIS — M05.79 SEROPOSITIVE RHEUMATOID ARTHRITIS OF MULTIPLE SITES (HCC): ICD-10-CM

## 2022-09-23 DIAGNOSIS — Z79.899 LONG-TERM USE OF HIGH-RISK MEDICATION: ICD-10-CM

## 2022-09-23 DIAGNOSIS — M05.79 SEROPOSITIVE RHEUMATOID ARTHRITIS OF MULTIPLE SITES (HCC): Primary | ICD-10-CM

## 2022-09-23 LAB
ALBUMIN SERPL-MCNC: 4.1 G/DL (ref 3.2–4.6)
ALBUMIN/GLOB SERPL: 1 {RATIO} (ref 1.2–3.5)
ALP SERPL-CCNC: 83 U/L (ref 50–136)
ALT SERPL-CCNC: 26 U/L (ref 12–65)
ANION GAP SERPL CALC-SCNC: 1 MMOL/L (ref 4–13)
AST SERPL-CCNC: 29 U/L (ref 15–37)
BASOPHILS # BLD: 0.1 K/UL (ref 0–0.2)
BASOPHILS NFR BLD: 1 % (ref 0–2)
BILIRUB SERPL-MCNC: 0.7 MG/DL (ref 0.2–1.1)
BUN SERPL-MCNC: 8 MG/DL (ref 8–23)
CALCIUM SERPL-MCNC: 9.3 MG/DL (ref 8.3–10.4)
CHLORIDE SERPL-SCNC: 111 MMOL/L (ref 101–110)
CO2 SERPL-SCNC: 28 MMOL/L (ref 21–32)
CREAT SERPL-MCNC: 0.7 MG/DL (ref 0.8–1.5)
CRP SERPL-MCNC: 0.4 MG/DL (ref 0–0.9)
DIFFERENTIAL METHOD BLD: ABNORMAL
EOSINOPHIL # BLD: 0.3 K/UL (ref 0–0.8)
EOSINOPHIL NFR BLD: 5 % (ref 0.5–7.8)
ERYTHROCYTE [DISTWIDTH] IN BLOOD BY AUTOMATED COUNT: 13.6 % (ref 11.9–14.6)
GLOBULIN SER CALC-MCNC: 4 G/DL (ref 2.3–3.5)
GLUCOSE SERPL-MCNC: 84 MG/DL (ref 65–100)
HCT VFR BLD AUTO: 47.4 % (ref 41.1–50.3)
HGB BLD-MCNC: 16.1 G/DL (ref 13.6–17.2)
IMM GRANULOCYTES # BLD AUTO: 0 K/UL (ref 0–0.5)
IMM GRANULOCYTES NFR BLD AUTO: 0 % (ref 0–5)
LYMPHOCYTES # BLD: 1.5 K/UL (ref 0.5–4.6)
LYMPHOCYTES NFR BLD: 25 % (ref 13–44)
MCH RBC QN AUTO: 34.8 PG (ref 26.1–32.9)
MCHC RBC AUTO-ENTMCNC: 34 G/DL (ref 31.4–35)
MCV RBC AUTO: 102.4 FL (ref 79.6–97.8)
MONOCYTES # BLD: 0.7 K/UL (ref 0.1–1.3)
MONOCYTES NFR BLD: 12 % (ref 4–12)
NEUTS SEG # BLD: 3.5 K/UL (ref 1.7–8.2)
NEUTS SEG NFR BLD: 57 % (ref 43–78)
NRBC # BLD: 0 K/UL (ref 0–0.2)
PLATELET # BLD AUTO: 211 K/UL (ref 150–450)
PMV BLD AUTO: 10 FL (ref 9.4–12.3)
POTASSIUM SERPL-SCNC: 4 MMOL/L (ref 3.5–5.1)
PROT SERPL-MCNC: 8.1 G/DL (ref 6.3–8.2)
RBC # BLD AUTO: 4.63 M/UL (ref 4.23–5.6)
SODIUM SERPL-SCNC: 140 MMOL/L (ref 136–145)
WBC # BLD AUTO: 6.1 K/UL (ref 4.3–11.1)

## 2022-09-23 PROCEDURE — 4004F PT TOBACCO SCREEN RCVD TLK: CPT | Performed by: INTERNAL MEDICINE

## 2022-09-23 PROCEDURE — G8427 DOCREV CUR MEDS BY ELIG CLIN: HCPCS | Performed by: INTERNAL MEDICINE

## 2022-09-23 PROCEDURE — 1123F ACP DISCUSS/DSCN MKR DOCD: CPT | Performed by: INTERNAL MEDICINE

## 2022-09-23 PROCEDURE — G8420 CALC BMI NORM PARAMETERS: HCPCS | Performed by: INTERNAL MEDICINE

## 2022-09-23 PROCEDURE — 99204 OFFICE O/P NEW MOD 45 MIN: CPT | Performed by: INTERNAL MEDICINE

## 2022-09-23 PROCEDURE — 3017F COLORECTAL CA SCREEN DOC REV: CPT | Performed by: INTERNAL MEDICINE

## 2022-09-23 RX ORDER — ADALIMUMAB 80MG/0.8ML
80 KIT SUBCUTANEOUS
Qty: 6 EACH | Refills: 0 | Status: SHIPPED | OUTPATIENT
Start: 2022-09-23

## 2022-09-23 RX ORDER — HYDROXYCHLOROQUINE SULFATE 200 MG/1
TABLET, FILM COATED ORAL
Qty: 180 TABLET | Refills: 1 | Status: SHIPPED | OUTPATIENT
Start: 2022-09-23

## 2022-09-23 ASSESSMENT — ROUTINE ASSESSMENT OF PATIENT INDEX DATA (RAPID3)
WHEN YOU AWAKENED IN THE MORNING OVER THE LAST WEEK, PLEASE INDICATE THE AMOUNT OF TIME IT TAKES UNTIL YOU ARE AS LIMBER AS YOU WILL BE FOR THE DAY: 1 HOUR
ON A SCALE OF ONE TO TEN, HOW MUCH OF A PROBLEM HAS UNUSUAL FATIGUE OR TIREDNESS BEEN FOR YOU OVER THE PAST WEEK?: 3
ON A SCALE OF ONE TO TEN, CONSIDERING ALL THE WAYS IN WHICH ILLNESS AND HEALTH CONDITIONS MAY AFFECT YOU AT THIS TIME, PLEASE INDICATE BELOW HOW YOU ARE DOING:: 4
ON A SCALE OF ONE TO TEN, HOW MUCH PAIN HAVE YOU HAD BECAUSE OF YOUR CONDITION OVER THE PAST WEEK?: 3

## 2022-09-23 ASSESSMENT — JOINT PAIN
TOTAL NUMBER OF SWOLLEN JOINTS: 0
TOTAL NUMBER OF TENDER JOINTS: 0

## 2022-09-23 NOTE — PROGRESS NOTES
Shakira Ricci M.D.  [address] [place] [address] [address], [address]  Office : [phone], Fax: [phone]      2022    Dear Chrissie Ryan,    Thank you for asking me to assist in the care of your patient Howard who was seen in my office on 2022 for evaluation of seropositive rheumatoid arthritis establishing care with me. I have included the full consultation note below. I will continue to follow your patient and will forward all future office visit notes to you. If you have any questions or concerns about any aspect of your patient's care, please do not hesitate to contact me. I look forward to continuing to care for this patient with you. Sincerely,    Dr. Jimena Navarro NOTE  Date of Visit:  2022 8:57 AM    Patient Information:  Name:  Howard  :  1950  Age:  81 y.o. Gender:  male    PHYSICIAN REQUESTING CONSULTATION:  Dr. Lopez Wallace    Chief Complaint:  Chief Complaint   Patient presents with    Consultation    Abnormal Test Results         History of Present Illness:  Howard is a 81 y.o. male who was referred for evaluation of seropositive RA establishing care with me. On talking to the patient today he states that he was diagnosis of RA approximately 20 years ago and had been on IV Remicade for 10 years which caused him to have recurrent infections later into the course of being on the drug and was switched to Humira which he has been on for 2 years now. In the last month the dosage of the Humira was increased from 40 mg every 2 weeks to 80 mg every 2 weeks. On reviewing his records it does appear he was treated with the Medrol dose pack for a flare involving his wrist in 2022. 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   On talking to him further he states that he has been on Plaquenil for the last 1 Remicade    RA (rheumatoid arthritis) (HCC)     hands, ankles and feet       Past Surgical History  Past Surgical History:   Procedure Laterality Date    CHEST SURGERY Left 2006    biopsy- lung nodule- benign per pt- yearly CT     CHEST SURGERY Left 2007    VAT (due to Remicade) , benign    CHEST SURGERY Right right    VAT (due to Remicade), due to fluid in lung, benign nodule    COLONOSCOPY  11/18/2019    ORTHOPEDIC SURGERY Bilateral 2013    removal of RA nodules     ORTHOPEDIC SURGERY Bilateral     repair due to collapsed feet       Family History  Family History   Problem Relation Age of Onset    Cancer Mother     Kidney Disease Father     Hypertension Father     Diabetes Father     Stroke Father     Diabetes Brother     Lung Disease Brother     Liver Disease Mother       Brother with h/o RA. Mother with SLE. No family h/o MS, thyroid problems or type 1 DM. Social History  Social History     Socioeconomic History    Marital status: Life Partner     Spouse name: Not on file    Number of children: Not on file    Years of education: Not on file    Highest education level: Not on file   Occupational History    Not on file   Tobacco Use    Smoking status: Every Day     Packs/day: 1.00     Types: Cigarettes    Smokeless tobacco: Never    Tobacco comments:     Quit smoking: decreased from 1 pk/day x 30 years   Substance and Sexual Activity    Alcohol use: Yes     Alcohol/week: 2.0 standard drinks     Types: 2 Shots of liquor per week    Drug use: No    Sexual activity: Not on file   Other Topics Concern    Not on file   Social History Narrative    Lives with girlfriend. Still works.      Social Determinants of Health     Financial Resource Strain: Not on file   Food Insecurity: Not on file   Transportation Needs: Not on file   Physical Activity: Not on file   Stress: Not on file   Social Connections: Not on file   Intimate Partner Violence: Not on file   Housing Stability: Not on file          Allergy:  Allergies Allergen Reactions    Meloxicam Other (See Comments)     Cause BP elevation         Current Medications:  Current Outpatient Medications   Medication Sig Dispense Refill    Adalimumab (HUMIRA PEN) 80 MG/0.8ML PNKT Inject 80 mg into the skin every 14 days 6 each 0    hydroxychloroquine (PLAQUENIL) 200 MG tablet Take 2 pills once a day after food. 180 tablet 1    amLODIPine (NORVASC) 5 MG tablet TAKE 1 TABLET BY MOUTH EVERY DAY 90 tablet 0    rosuvastatin (CRESTOR) 20 MG tablet TAKE 1 TAB BY MOUTH AT BEDTIME DAILY 90 tablet 0    Adalimumab (HUMIRA PEN) 40 MG/0.4ML PNKT Inject 80 (ONE pen) UNDER THE SKIN every TWO WEEKS      aspirin 81 MG EC tablet Take 81 mg by mouth daily      triamcinolone (KENALOG) 0.1 % cream daily       No current facility-administered medications for this visit. Review Of Systems:  Itchy eyes, joint pain and swelling of hands and feet. Physical Exam:  Blood pressure 129/88, pulse 81, height 5' 10\" (1.778 m), weight 145 lb (65.8 kg). General:  Patient alert, cooperative and in no apparent distress. HEENT: Pupils equally reactive to light and accomodation, minimal scleral injection noted. Skin:  No rashes. No nail abnormalities. Cardiac:  Normal rate and rhythm. No rubs and gallops appreciated. Lungs: Clear to auscultation bilaterally. Abdomen: Soft, non tender with no hepatosplenomegaly. Neurologic:  Oriented, normal speech and affect. Normal gait. Extremities:  No edema in bilateral lower extremities with no cyanosis, clubbing. Muskoskeletal Exam:     I examined the neck, spine, shoulders, elbows, wrists, MCPs, PIPs, DIPs, knees, hips, ankles and feet bilaterally for strength, range of motion, deformity, tenderness, swelling, and synovitis.       The findings are:     Physical Exam              Joint Exam 09/23/2022        Right  Left   CMC   Tender      Tarsometatarsal   Tender   Tender     Presence of ulnar drift involving the right 2nd-5th fingers with no opportunity to participate in the care of this patient. Electronically signed by:  Madyson Mccord MD      This note was dictated using dragon voice recognition software.   It has been proofread, but there may still exist voice recognition errors that the author did not detect.          ----------------------------------------------------------------------------------------------------------------------------------------------------------------------------------------------------------------------

## 2022-09-26 DIAGNOSIS — I25.10 ATHEROSCLEROTIC HEART DISEASE OF NATIVE CORONARY ARTERY WITHOUT ANGINA PECTORIS: ICD-10-CM

## 2022-09-26 DIAGNOSIS — E78.2 MIXED HYPERLIPIDEMIA: ICD-10-CM

## 2022-09-26 LAB
RHEUMATOID FACT SER QL LA: POSITIVE
RHEUMATOID FACT TITR SER LA: NORMAL {TITER}

## 2022-09-26 RX ORDER — ROSUVASTATIN CALCIUM 20 MG/1
TABLET, COATED ORAL
Qty: 90 TABLET | Refills: 0 | Status: SHIPPED | OUTPATIENT
Start: 2022-09-26

## 2022-09-27 LAB — CCP IGA+IGG SERPL IA-ACNC: >250 UNITS (ref 0–19)

## 2022-11-04 ENCOUNTER — HOSPITAL ENCOUNTER (OUTPATIENT)
Dept: WOUND CARE | Age: 72
Discharge: HOME | End: 2022-11-04
Payer: MEDICARE

## 2022-11-04 VITALS
DIASTOLIC BLOOD PRESSURE: 86 MMHG | WEIGHT: 145 LBS | SYSTOLIC BLOOD PRESSURE: 130 MMHG | BODY MASS INDEX: 20.76 KG/M2 | HEIGHT: 70 IN | TEMPERATURE: 98.4 F | HEART RATE: 86 BPM

## 2022-11-04 DIAGNOSIS — S01.80XA OPEN FOREHEAD WOUND, INITIAL ENCOUNTER: ICD-10-CM

## 2022-11-04 PROCEDURE — 99213 OFFICE O/P EST LOW 20 MIN: CPT

## 2022-11-04 PROCEDURE — 11042 DBRDMT SUBQ TIS 1ST 20SQCM/<: CPT

## 2022-11-04 PROCEDURE — 11042 DBRDMT SUBQ TIS 1ST 20SQCM/<: CPT | Performed by: FAMILY MEDICINE

## 2022-11-04 PROCEDURE — 99202 OFFICE O/P NEW SF 15 MIN: CPT | Performed by: FAMILY MEDICINE

## 2022-11-04 RX ORDER — LIDOCAINE HYDROCHLORIDE 20 MG/ML
JELLY TOPICAL PRN
Status: DISCONTINUED | OUTPATIENT
Start: 2022-11-04 | End: 2022-11-05 | Stop reason: HOSPADM

## 2022-11-04 RX ADMIN — LIDOCAINE HYDROCHLORIDE: 20 JELLY TOPICAL at 14:19

## 2022-11-04 NOTE — DISCHARGE INSTRUCTIONS
Discharge Instructions for  Dao Brennan  [address]  [place] [address], [address]  Phone [phone]   Fax [phone]        NAME:  Paolo Crooks OF BIRTH:  1950  MEDICAL RECORD NUMBER:  [id number]  DATE:  11/4/2022     Return Appointment:   1 week with Guillaume Williamson DO     Instructions: Left forehead:  Cleanse with normal saline. Soak wound for at least 15 seconds with Vashe wound solution (ordering pamphlet sent with). Hydrofera Ready: Cut to wound size, place in wound bed, shiny side out. Cover with Medafix tape strip. Change three times per week. Increase dietary protein to at least 60 grams per day using milk, eggs, & meat. May also use Ensure Max or Dejan (samples and coupons given at this visit). Should you experience increased redness, swelling, pain, foul odor, size of wound(s), or have a temperature over 101 degrees please contact the [address] [place] [address] Road at [phone] or if after hours contact your primary care physician or go to the hospital emergency department. PLEASE NOTE: IF YOU ARE UNABLE TO OBTAIN WOUND SUPPLIES, CONTINUE TO USE THE SUPPLIES YOU HAVE AVAILABLE UNTIL YOU ARE ABLE TO REACH US. IT IS MOST IMPORTANT TO KEEP THE WOUND COVERED AT ALL TIMES.      Electronically signed Ankita Arreaga RN on 11/4/2022 at 2:43 PM

## 2022-11-04 NOTE — FLOWSHEET NOTE
Post Debridement     11/04/22 1426   Wound 11/04/22 Face Left;Upper Forehead   Date First Assessed/Time First Assessed: 11/04/22 1425   Present on Hospital Admission: Yes  Wound Approximate Age at First Assessment (Weeks): 1 weeks  Primary Wound Type: Traumatic  Location: Face  Wound Location Orientation: Left;Upper  Wound Descr. ..    Wound Image     Wound Etiology Traumatic   Wound Cleansed Cleansed with saline   Wound Length (cm) 0.5 cm   Wound Width (cm) 1.8 cm   Wound Depth (cm) 0.1 cm   Wound Surface Area (cm^2) 0.9 cm^2   Wound Volume (cm^3) 0.09 cm^3   Post-Procedure Length (cm) 0.5 cm   Post-Procedure Width (cm) 1.8 cm   Post-Procedure Depth (cm) 0.1 cm   Post-Procedure Surface Area (cm^2) 0.9 cm^2   Post-Procedure Volume (cm^3) 0.09 cm^3   Wound Assessment Dry;Pink/red;Slough   Drainage Amount Scant   Drainage Description Serosanguinous   Odor None   Jennifer-wound Assessment Fragile   Margins Attached edges   Wound Thickness Description not for Pressure Injury Full thickness No

## 2022-11-04 NOTE — WOUND CARE
Discharge Instructions for  Dao Brennan  [address] [facility] [address]  [address] [facility], [address]  Phone [phone]   Fax [phone]      NAME:  Ashanti Ag OF BIRTH:  1950  MEDICAL RECORD NUMBER:  [id number]  DATE:  11/4/2022    Return Appointment:   1 week with Enrique Vegas,     Instructions: Left forehead:  Cleanse with normal saline. Soak wound for at least 15 seconds with Vashe wound solution (ordering pamphlet sent with). Hydrofera Ready: Cut to wound size, place in wound bed, shiny side out. Cover with Medafix tape strip. Change three times per week. Increase dietary protein to at least 60 grams per day using milk, eggs, & meat. May also use Ensure Max or Dejan (samples and coupons given at this visit). Should you experience increased redness, swelling, pain, foul odor, size of wound(s), or have a temperature over 101 degrees please contact the [address] [place] [address] Road at [phone] or if after hours contact your primary care physician or go to the hospital emergency department. PLEASE NOTE: IF YOU ARE UNABLE TO OBTAIN WOUND SUPPLIES, CONTINUE TO USE THE SUPPLIES YOU HAVE AVAILABLE UNTIL YOU ARE ABLE TO REACH US. IT IS MOST IMPORTANT TO KEEP THE WOUND COVERED AT ALL TIMES.     Electronically signed Tunde Shanks RN on 11/4/2022 at 2:43 PM

## 2022-11-04 NOTE — FLOWSHEET NOTE
11/04/22 1426   Wound 11/04/22 Face Left;Upper Forehead   Date First Assessed/Time First Assessed: 11/04/22 1425   Present on Hospital Admission: Yes  Wound Approximate Age at First Assessment (Weeks): 1 weeks  Primary Wound Type: Traumatic  Location: Face  Wound Location Orientation: Left;Upper  Wound Descr. ..    Wound Image    Wound Etiology Traumatic   Wound Cleansed Cleansed with saline   Wound Length (cm) 0.5 cm   Wound Width (cm) 1.8 cm   Wound Depth (cm) 0.1 cm   Wound Surface Area (cm^2) 0.9 cm^2   Wound Volume (cm^3) 0.09 cm^3   Wound Assessment Dry;Pink/red;Slough   Drainage Amount Scant   Drainage Description Serosanguinous   Odor None   Jennifer-wound Assessment Fragile   Margins Attached edges   Wound Thickness Description not for Pressure Injury Full thickness

## 2022-11-07 PROBLEM — S01.80XA OPEN FOREHEAD WOUND, INITIAL ENCOUNTER: Status: ACTIVE | Noted: 2022-11-07

## 2022-11-07 RX ORDER — BACITRACIN, NEOMYCIN, POLYMYXIN B 400; 3.5; 5 [USP'U]/G; MG/G; [USP'U]/G
OINTMENT TOPICAL ONCE
Status: CANCELLED | OUTPATIENT
Start: 2022-11-07 | End: 2022-11-07

## 2022-11-07 RX ORDER — LIDOCAINE HYDROCHLORIDE 20 MG/ML
JELLY TOPICAL ONCE
Status: CANCELLED | OUTPATIENT
Start: 2022-11-07 | End: 2022-11-07

## 2022-11-07 RX ORDER — GENTAMICIN SULFATE 1 MG/G
OINTMENT TOPICAL ONCE
Status: CANCELLED | OUTPATIENT
Start: 2022-11-07 | End: 2022-11-07

## 2022-11-07 RX ORDER — CLOBETASOL PROPIONATE 0.5 MG/G
OINTMENT TOPICAL ONCE
Status: CANCELLED | OUTPATIENT
Start: 2022-11-07 | End: 2022-11-07

## 2022-11-07 RX ORDER — LIDOCAINE 40 MG/G
CREAM TOPICAL ONCE
Status: CANCELLED | OUTPATIENT
Start: 2022-11-07 | End: 2022-11-07

## 2022-11-07 RX ORDER — LIDOCAINE 50 MG/G
OINTMENT TOPICAL ONCE
Status: CANCELLED | OUTPATIENT
Start: 2022-11-07 | End: 2022-11-07

## 2022-11-07 RX ORDER — LIDOCAINE HYDROCHLORIDE 40 MG/ML
SOLUTION TOPICAL ONCE
Status: CANCELLED | OUTPATIENT
Start: 2022-11-07 | End: 2022-11-07

## 2022-11-07 RX ORDER — BETAMETHASONE DIPROPIONATE 0.05 %
OINTMENT (GRAM) TOPICAL ONCE
Status: CANCELLED | OUTPATIENT
Start: 2022-11-07 | End: 2022-11-07

## 2022-11-07 RX ORDER — GINSENG 100 MG
CAPSULE ORAL ONCE
Status: CANCELLED | OUTPATIENT
Start: 2022-11-07 | End: 2022-11-07

## 2022-11-07 RX ORDER — BACITRACIN ZINC AND POLYMYXIN B SULFATE 500; 1000 [USP'U]/G; [USP'U]/G
OINTMENT TOPICAL ONCE
Status: CANCELLED | OUTPATIENT
Start: 2022-11-07 | End: 2022-11-07

## 2022-11-07 NOTE — PROGRESS NOTES
[address] [facility]   History and Physical Note   Referring Provider: Marisol Mitchell MD  Reason for Referral: Forehead wound above left eyebrow. [facility] RECORD NUMBER:  [id number]  AGE: 81 y.o. GENDER: male  : 1950  EPISODE DATE:  2022    Chief complaint and reason for visit:     Chief Complaint   Patient presents with    Wound Check      left forehead             HISTORY of PRESENT ILLNESS HPI     Jonah Segura is a 81 y.o. male who presents today for an initial evaluation of a wound/ulcer. Patient is new to the wound center. Wound duration:  5 day(s). History of Wound Context: Patient comes in today with a laceration he sustained after trauma to the left eyebrow area. He did not go get help immediately so this has not been stitched closed. Does have some debris on it. No erythema. Patient denies loss of consciousness. Comes in today for evaluation and treatment of this open wound above his eyebrow left side. Pertinent associated symptoms: drainage     PAST MEDICAL HISTORY        Diagnosis Date    Adverse effect of anesthesia     shortness of breath when waking, happened once and no other episodes after surgery since.      Chronic obstructive pulmonary disease (HCC)     mild    ED (erectile dysfunction)     Full dentures     GERD (gastroesophageal reflux disease)     seldom    History of skin cancer     non- melanoma skin cancer- removed, left hand    Lung collapse     related to pneumonia caused by Remicade    RA (rheumatoid arthritis) ([address]     hands, ankles and feet       PAST SURGICAL HISTORY  Past Surgical History:   Procedure Laterality Date    CHEST SURGERY Left 2006    biopsy- lung nodule- benign per pt- yearly CT     CHEST SURGERY Left 2007    VAT (due to Remicade) , benign    CHEST SURGERY Right right    VAT (due to Remicade), due to fluid in lung, benign nodule    COLONOSCOPY  2019    ORTHOPEDIC SURGERY Bilateral 2013    removal of RA nodules     ORTHOPEDIC SURGERY Bilateral     repair due to collapsed feet       FAMILY HISTORY  Family History   Problem Relation Age of Onset    Cancer Mother     Kidney Disease Father     Hypertension Father     Diabetes Father     Stroke Father     Diabetes Brother     Lung Disease Brother     Liver Disease Mother        SOCIAL HISTORY  Social History     Tobacco Use    Smoking status: Every Day     Packs/day: 1.00     Types: Cigarettes    Smokeless tobacco: Never    Tobacco comments:     Quit smoking: decreased from 1 pk/day x 30 years   Substance Use Topics    Alcohol use: Yes     Alcohol/week: 2.0 standard drinks     Types: 2 Shots of liquor per week    Drug use: No       ALLERGIES  Allergies   Allergen Reactions    Meloxicam Other (See Comments)     Cause BP elevation       MEDICATIONS  Current Outpatient Medications on File Prior to Encounter   Medication Sig Dispense Refill    rosuvastatin (CRESTOR) 20 MG tablet TAKE 1 TABLET BY MOUTH EVERYDAY AT BEDTIME 90 tablet 0    Adalimumab (HUMIRA PEN) 80 MG/0.8ML PNKT Inject 80 mg into the skin every 14 days 6 each 0    hydroxychloroquine (PLAQUENIL) 200 MG tablet Take 2 pills once a day after food. 180 tablet 1    amLODIPine (NORVASC) 5 MG tablet TAKE 1 TABLET BY MOUTH EVERY DAY 90 tablet 0    Adalimumab (HUMIRA PEN) 40 MG/0.4ML PNKT Inject 80 (ONE pen) UNDER THE SKIN every TWO WEEKS      aspirin 81 MG EC tablet Take 81 mg by mouth daily      triamcinolone (KENALOG) 0.1 % cream daily       No current facility-administered medications on file prior to encounter. REVIEW OF SYSTEMS  A comprehensive review of systems was negative except for: Pertinent items are noted in HPI.     Objective:      /86   Pulse 86   Temp 98.4 °F (36.9 °C) (Oral)   Ht 5' 10\" (1.778 m)   Wt 145 lb (65.8 kg)   BMI 20.81 kg/m²     Wt Readings from Last 3 Encounters:   11/04/22 145 lb (65.8 kg)   09/23/22 145 lb (65.8 kg)   08/19/22 147 lb (66.7 kg) PHYSICAL EXAM  General Appearance/Constitutional: alert and oriented to person, place and time,  and in no acute distress. Nontoxic. Skin: warm and dry, no rash, positive wound per LDA documentation if applicable. Head: normocephalic and atraumatic. Eyes: extraocular eye movements intact, conjunctivae normal, and sclera anicteric. ENT: hearing grossly normal bilaterally. Normal appearance. Pulmonary/Chest: no chest wall tenderness and clear anteriorly. No respiratory distress. Cardiovascular: normal rate and regular rhythm. GI: abdomen soft, non-tender and non-distended. Musculoskeletal: normal range of motion of joints. Nontender calves. No cyanosis. Nontender calves. Edema trace  Neurologic: no gross cranial nerve deficit and speech normal. No focal deficits. Mental status normal.    Medical Decision Making:     Patient sustained blunt trauma to left eyebrow for 5 days ago. At this point this area was debrided well with some necrotic tissue on the surface. We will utilize [facility] and try to hold this in place. No sign of infection at this time. Patient to get 60 g of protein daily. We will get this to heal up nicely. This could have used sutures initially but were obviously out of the window for that at this time. Assessment required other independent historian(s): Yes. Additional Historian: patient . Comorbid conditions affecting wound healing: As noted in [address] and [facility] which was reviewed. Problem List Items Addressed This Visit          Other    * (Principal) Open forehead wound, initial encounter       Wounds and Treatment Plan:  Wound was debrided today. Good bleeding with debridement. No sign of infection. Utilize Hydrofera Blue over the wound bed. Patient to follow-up next week. Other diagnoses or problems addressed:      Pertinent labs reviewed. Review of medical records and external note (s) from other providers was done for this visit.     New lab or imaging orders placed:  No new labs today. Prescription drug management: N/A     Risk of complications and/or mortality of patient management: This patient has a minimal risk of morbidity and mortality from additional diagnostic testing or treatment. This is due to the above conditions affecting wound healing as well as patient and procedure risk factors. Education and discussion held with patient regarding these disease processes pertinent to wound(s). Other pertinent decisions include: minor surgery or procedures as below. The patient's diagnosis or treatment is  significantly limited by social determinants of health as noted by: nutritional resource limitations . Discussion of management or test interpretation with N/A. Time spent with patient and patient care issues above the usual time needed for wound assessment and treatment was: [] 15-20 min  [x] 21-30 min  [] 31-44 min  [] 45 min or more  This included time retrieving and reviewing records with patient and education provided to patient regarding disease process(es), offloading or pressure relief, nutrition needed for wound healing, smoking cessation when applicable, and infection risk.     This time also included physician non-face-to-face service time visit on the date of service such as  Preparing to see the patient (eg, review of tests)  Obtaining and/or reviewing separately obtained history  Performing a medically necessary appropriate examination and/or evaluation  Counseling and educating the patient/family/caregiver  Ordering medications, tests, or procedures  Referring and communicating with other health care professionals as needed  Documenting clinical information in the electronic or other health record  Independently interpreting results (not reported separately) and communicating results to the patient/family/caregiver  Care coordination (not reported separately)    Wound 11/04/22 Face Left;Upper Forehead (Active)   Wound Image    11/04/22 7552 Wound Etiology Traumatic 11/04/22 1426   Wound Cleansed Cleansed with saline 11/04/22 1426   Wound Length (cm) 0.5 cm 11/04/22 1426   Wound Width (cm) 1.8 cm 11/04/22 1426   Wound Depth (cm) 0.1 cm 11/04/22 1426   Wound Surface Area (cm^2) 0.9 cm^2 11/04/22 1426   Wound Volume (cm^3) 0.09 cm^3 11/04/22 1426   Post-Procedure Length (cm) 0.5 cm 11/04/22 1426   Post-Procedure Width (cm) 1.8 cm 11/04/22 1426   Post-Procedure Depth (cm) 0.1 cm 11/04/22 1426   Post-Procedure Surface Area (cm^2) 0.9 cm^2 11/04/22 1426   Post-Procedure Volume (cm^3) 0.09 cm^3 11/04/22 1426   Wound Assessment Dry;Pink/red;Slough 11/04/22 1426   Drainage Amount Scant 11/04/22 1426   Drainage Description Serosanguinous 11/04/22 1426   Odor None 11/04/22 1426   Jennifer-wound Assessment Fragile 11/04/22 1426   Margins Attached edges 11/04/22 1426   Wound Thickness Description not for Pressure Injury Full thickness 11/04/22 1426   Number of days: 2          Procedures done this visit:   Procedure Note  Indications:  Based on my examination of this patient's wound(s)/ulcer(s) today, debridement is required to promote healing and evaluate the wound base. Performed by: Sara De La Torre DO  Consent obtained:  Yes  Time out taken:  Yes  Pain Control:     Debridement: Excisional Debridement  Using curette the wound(s)/ulcer(s) was/were debrided down through and including the removal of epidermis, dermis, and subcutaneous tissue. Devitalized Tissue Debrided:  fibrin, biofilm, slough, and necrotic/eschar  Pre Debridement Measurements:  Are located in the [place]  Documentation Flow Sheet  Diabetic/Pressure/Non Pressure Ulcers only:  Ulcer: Non-Pressure ulcer, muscle necrosis   Wound/Ulcer #: 1  Post Debridement Measurements:  Wound/Ulcer Descriptions are Pre Debridement except measurements:   Total Surface Area Debrided:  0.9 sq cm   Estimated Blood Loss:  Minimal  Hemostasis Achieved:  by pressure  Procedural Pain:  1  / 10   Post Procedural Pain:  1 / 10   Response to treatment:  Well tolerated by patient. Written patient dismissal instructions given to patient and signed by patient or POA. Patient voiced understanding that the importance of adherence to instructions is paramount to wound healing improvement or success.      Electronically signed by Gino Watts DO on 11/7/2022 at 10:08 AM

## 2022-11-10 ENCOUNTER — HOSPITAL ENCOUNTER (OUTPATIENT)
Dept: WOUND CARE | Age: 72
Discharge: HOME | End: 2022-11-10
Payer: MEDICARE

## 2022-11-10 VITALS — BODY MASS INDEX: 21.47 KG/M2 | HEIGHT: 70 IN | WEIGHT: 150 LBS

## 2022-11-10 DIAGNOSIS — S01.80XA OPEN FOREHEAD WOUND, INITIAL ENCOUNTER: Primary | ICD-10-CM

## 2022-11-10 PROCEDURE — 17250 CHEM CAUT OF GRANLTJ TISSUE: CPT

## 2022-11-10 PROCEDURE — 17250 CHEM CAUT OF GRANLTJ TISSUE: CPT | Performed by: FAMILY MEDICINE

## 2022-11-10 RX ORDER — BACITRACIN, NEOMYCIN, POLYMYXIN B 400; 3.5; 5 [USP'U]/G; MG/G; [USP'U]/G
OINTMENT TOPICAL ONCE
OUTPATIENT
Start: 2022-11-10 | End: 2022-11-10

## 2022-11-10 RX ORDER — BACITRACIN ZINC AND POLYMYXIN B SULFATE 500; 1000 [USP'U]/G; [USP'U]/G
OINTMENT TOPICAL ONCE
OUTPATIENT
Start: 2022-11-10 | End: 2022-11-10

## 2022-11-10 RX ORDER — CLOBETASOL PROPIONATE 0.5 MG/G
OINTMENT TOPICAL ONCE
OUTPATIENT
Start: 2022-11-10 | End: 2022-11-10

## 2022-11-10 RX ORDER — LIDOCAINE 50 MG/G
OINTMENT TOPICAL ONCE
OUTPATIENT
Start: 2022-11-10 | End: 2022-11-10

## 2022-11-10 RX ORDER — GENTAMICIN SULFATE 1 MG/G
OINTMENT TOPICAL ONCE
OUTPATIENT
Start: 2022-11-10 | End: 2022-11-10

## 2022-11-10 RX ORDER — GINSENG 100 MG
CAPSULE ORAL ONCE
OUTPATIENT
Start: 2022-11-10 | End: 2022-11-10

## 2022-11-10 RX ORDER — LIDOCAINE 40 MG/G
CREAM TOPICAL ONCE
OUTPATIENT
Start: 2022-11-10 | End: 2022-11-10

## 2022-11-10 RX ORDER — BETAMETHASONE DIPROPIONATE 0.05 %
OINTMENT (GRAM) TOPICAL ONCE
OUTPATIENT
Start: 2022-11-10 | End: 2022-11-10

## 2022-11-10 RX ORDER — LIDOCAINE HYDROCHLORIDE 40 MG/ML
SOLUTION TOPICAL ONCE
OUTPATIENT
Start: 2022-11-10 | End: 2022-11-10

## 2022-11-10 RX ORDER — LIDOCAINE HYDROCHLORIDE 20 MG/ML
JELLY TOPICAL ONCE
Status: COMPLETED | OUTPATIENT
Start: 2022-11-10 | End: 2022-11-10

## 2022-11-10 RX ORDER — LIDOCAINE HYDROCHLORIDE 20 MG/ML
JELLY TOPICAL ONCE
OUTPATIENT
Start: 2022-11-10 | End: 2022-11-10

## 2022-11-10 RX ADMIN — LIDOCAINE HYDROCHLORIDE: 20 JELLY TOPICAL at 16:18

## 2022-11-10 NOTE — FLOWSHEET NOTE
11/10/22 1613   Wound 11/04/22 Face Left;Upper Forehead   Date First Assessed/Time First Assessed: 11/04/22 1425   Present on Hospital Admission: Yes  Wound Approximate Age at First Assessment (Weeks): 1 weeks  Primary Wound Type: Traumatic  Location: Face  Wound Location Orientation: Left;Upper  Wound Descr. ..    Wound Image    Wound Etiology Traumatic   Wound Cleansed Cleansed with saline   Wound Length (cm) 0.5 cm   Wound Width (cm) 1.7 cm   Wound Depth (cm) 0.1 cm   Wound Surface Area (cm^2) 0.85 cm^2   Change in Wound Size % (l*w) 5.56   Wound Volume (cm^3) 0.085 cm^3   Wound Healing % 6   Wound Assessment Hyper granulation tissue;Pink/red;Epithelialization   Drainage Amount Scant   Drainage Description Serosanguinous   Wound Thickness Description not for Pressure Injury Full thickness

## 2022-11-10 NOTE — WOUND CARE
Discharge Instructions for  Dao Brennan  [address]  [address] [place] [address], [address]  Phone [phone]   Fax [phone]      NAME:  Bandar Lucero OF BIRTH:  1950  MEDICAL RECORD NUMBER:  [id number]  DATE:  11/10/2022    Return Appointment:   1 week with Kati Toney DO      Instructions: Forehead  Cleanse wound and periwound with wound cleanser or normal saline. You may cleanse with Vashe at each dressing change  Xeroform- apply to wound bed and secure with bandaid. Change daily. Should you experience increased redness, swelling, pain, foul odor, size of wound(s), or have a temperature over 101 degrees please contact the [address] [place] [address] Road at [phone] or if after hours contact your primary care physician or go to the hospital emergency department. PLEASE NOTE: IF YOU ARE UNABLE TO OBTAIN WOUND SUPPLIES, CONTINUE TO USE THE SUPPLIES YOU HAVE AVAILABLE UNTIL YOU ARE ABLE TO REACH US. IT IS MOST IMPORTANT TO KEEP THE WOUND COVERED AT ALL TIMES.     Electronically signed Lisandro Sosa RN on 11/10/2022 at 4:20 PM

## 2022-11-10 NOTE — DISCHARGE INSTRUCTIONS
Discharge Instructions for  Dao Brennan  [address] [place] [address], [facility] [address]   Phone [phone]   Fax [phone]      NAME:  Vik Amaya OF BIRTH:  1950  MEDICAL RECORD NUMBER:  [id number]  DATE:  @ED@    Return Appointment:   1 week with Caro Roach, DO      Instructions: Forehead  Cleanse wound and periwound with wound cleanser or normal saline. You may cleanse with Vashe at each dressing change  Xeroform- apply to wound bed and secure with bandaid. Change daily. Should you experience increased redness, swelling, pain, foul odor, size of wound(s), or have a temperature over 101 degrees please contact the [address] [place] [address] Road at [phone] or if after hours contact your primary care physician or go to the hospital emergency department. PLEASE NOTE: IF YOU ARE UNABLE TO OBTAIN WOUND SUPPLIES, CONTINUE TO USE THE SUPPLIES YOU HAVE AVAILABLE UNTIL YOU ARE ABLE TO REACH US. IT IS MOST IMPORTANT TO KEEP THE WOUND COVERED AT ALL TIMES.     Electronically signed Derrick Bradford RN on 11/10/2022 at 4:22 PM

## 2022-11-10 NOTE — PROGRESS NOTES
Chemical Cautery  Performed by: Chandan Dyer DO  Consent obtained: Yes  Time out taken: Yes  Tissue Type: Hypergranulation  Pain Control:     Method: silver nitrate    Location of Chemical Cautery:   Wound/Ulcer #1  Procedural Pain: 0  / 10   Post Procedural Pain: 0 / 10   Response to treatment: Patient tolerated procedure well with no complaints of pain. Wound is definitely improving. Hopefully controlling his hypergranulation will get this to stabilize nicely. We will change from [facility] to Xeroform gauze daily over this next week. We will see patient somewhere between a week to 10 days.

## 2022-11-20 DIAGNOSIS — I10 ESSENTIAL (PRIMARY) HYPERTENSION: ICD-10-CM

## 2022-11-21 ENCOUNTER — HOSPITAL ENCOUNTER (OUTPATIENT)
Dept: WOUND CARE | Age: 72
Discharge: HOME | End: 2022-11-21
Payer: MEDICARE

## 2022-11-21 VITALS
RESPIRATION RATE: 18 BRPM | DIASTOLIC BLOOD PRESSURE: 73 MMHG | TEMPERATURE: 97.5 F | HEIGHT: 70 IN | SYSTOLIC BLOOD PRESSURE: 119 MMHG | BODY MASS INDEX: 21.47 KG/M2 | WEIGHT: 150 LBS | HEART RATE: 87 BPM

## 2022-11-21 PROCEDURE — 99212 OFFICE O/P EST SF 10 MIN: CPT | Performed by: FAMILY MEDICINE

## 2022-11-21 PROCEDURE — 99212 OFFICE O/P EST SF 10 MIN: CPT

## 2022-11-21 RX ORDER — AMLODIPINE BESYLATE 5 MG/1
TABLET ORAL
Qty: 30 TABLET | Refills: 0 | Status: SHIPPED | OUTPATIENT
Start: 2022-11-21 | End: 2022-11-28 | Stop reason: SDUPTHER

## 2022-11-21 NOTE — WOUND CARE
Discharge Instructions for  Dao Brennan  [address] [facility] [address]  [address]  [place], [address]  Phone [phone]   Fax [phone]      NAME:  Bandar Lucero OF BIRTH:  1950  MEDICAL RECORD NUMBER:  [id number]  DATE:  11/21/2022    Return Appointment:   Discharge with Kati Toney DO      Instructions: Wound is healed. No dressing needed. Be sure to use sunscreen if you are out in the sun. Apply vaseline twice daily for 2 weeks. Should you experience increased redness, swelling, pain, foul odor, size of wound(s), or have a temperature over 101 degrees please contact the [address] [place] [address] Road at [phone] or if after hours contact your primary care physician or go to the hospital emergency department. PLEASE NOTE: IF YOU ARE UNABLE TO OBTAIN WOUND SUPPLIES, CONTINUE TO USE THE SUPPLIES YOU HAVE AVAILABLE UNTIL YOU ARE ABLE TO REACH US. IT IS MOST IMPORTANT TO KEEP THE WOUND COVERED AT ALL TIMES.     Electronically signed Lieutenant Brandon RN on 11/21/2022 at 2:54 PM

## 2022-11-21 NOTE — FLOWSHEET NOTE
11/21/22 1418   Wound 11/04/22 Face Left;Upper Forehead   Date First Assessed/Time First Assessed: 11/04/22 1425   Present on Hospital Admission: Yes  Wound Approximate Age at First Assessment (Weeks): 1 weeks  Primary Wound Type: Traumatic  Location: Face  Wound Location Orientation: Left;Upper  Wound Descr. ..    Wound Image    Wound Etiology Traumatic   Dressing Status Intact   Wound Cleansed Cleansed with saline   Dressing/Treatment Open to air   Wound Length (cm) 0 cm   Wound Width (cm) 0 cm   Wound Depth (cm) 0 cm   Wound Surface Area (cm^2) 0 cm^2   Change in Wound Size % (l*w) 100   Wound Volume (cm^3) 0 cm^3   Wound Healing % 100   Wound Assessment Epithelialization   Drainage Amount None   Odor None   Jennifer-wound Assessment Intact

## 2022-11-28 ENCOUNTER — OFFICE VISIT (OUTPATIENT)
Dept: INTERNAL MEDICINE CLINIC | Facility: CLINIC | Age: 72
End: 2022-11-28
Payer: MEDICARE

## 2022-11-28 VITALS
BODY MASS INDEX: 20.9 KG/M2 | SYSTOLIC BLOOD PRESSURE: 123 MMHG | OXYGEN SATURATION: 98 % | DIASTOLIC BLOOD PRESSURE: 79 MMHG | HEIGHT: 70 IN | WEIGHT: 146 LBS | TEMPERATURE: 97.8 F | HEART RATE: 89 BPM

## 2022-11-28 DIAGNOSIS — Z79.899 LONG-TERM USE OF HIGH-RISK MEDICATION: ICD-10-CM

## 2022-11-28 DIAGNOSIS — I25.10 CORONARY ARTERY DISEASE DUE TO CALCIFIED CORONARY LESION: ICD-10-CM

## 2022-11-28 DIAGNOSIS — E78.2 MIXED HYPERLIPIDEMIA: ICD-10-CM

## 2022-11-28 DIAGNOSIS — M05.79 RHEUMATOID ARTHRITIS INVOLVING MULTIPLE SITES WITH POSITIVE RHEUMATOID FACTOR (HCC): ICD-10-CM

## 2022-11-28 DIAGNOSIS — Z00.00 MEDICARE ANNUAL WELLNESS VISIT, SUBSEQUENT: Primary | ICD-10-CM

## 2022-11-28 DIAGNOSIS — I25.84 CORONARY ARTERY DISEASE DUE TO CALCIFIED CORONARY LESION: ICD-10-CM

## 2022-11-28 DIAGNOSIS — I10 ESSENTIAL HYPERTENSION: ICD-10-CM

## 2022-11-28 DIAGNOSIS — R26.89 IMBALANCE: ICD-10-CM

## 2022-11-28 LAB
ANION GAP SERPL CALC-SCNC: 4 MMOL/L (ref 2–11)
BASOPHILS # BLD: 0.1 K/UL (ref 0–0.2)
BASOPHILS NFR BLD: 1 % (ref 0–2)
BUN SERPL-MCNC: 13 MG/DL (ref 8–23)
CALCIUM SERPL-MCNC: 9.5 MG/DL (ref 8.3–10.4)
CHLORIDE SERPL-SCNC: 109 MMOL/L (ref 101–110)
CO2 SERPL-SCNC: 30 MMOL/L (ref 21–32)
CREAT SERPL-MCNC: 0.8 MG/DL (ref 0.8–1.5)
DIFFERENTIAL METHOD BLD: ABNORMAL
EOSINOPHIL # BLD: 0.3 K/UL (ref 0–0.8)
EOSINOPHIL NFR BLD: 4 % (ref 0.5–7.8)
ERYTHROCYTE [DISTWIDTH] IN BLOOD BY AUTOMATED COUNT: 12.5 % (ref 11.9–14.6)
GLUCOSE SERPL-MCNC: 99 MG/DL (ref 65–100)
HCT VFR BLD AUTO: 49.2 % (ref 41.1–50.3)
HGB BLD-MCNC: 16.4 G/DL (ref 13.6–17.2)
IMM GRANULOCYTES # BLD AUTO: 0 K/UL (ref 0–0.5)
IMM GRANULOCYTES NFR BLD AUTO: 0 % (ref 0–5)
LYMPHOCYTES # BLD: 1.8 K/UL (ref 0.5–4.6)
LYMPHOCYTES NFR BLD: 25 % (ref 13–44)
MCH RBC QN AUTO: 34.6 PG (ref 26.1–32.9)
MCHC RBC AUTO-ENTMCNC: 33.3 G/DL (ref 31.4–35)
MCV RBC AUTO: 103.8 FL (ref 82–102)
MONOCYTES # BLD: 0.8 K/UL (ref 0.1–1.3)
MONOCYTES NFR BLD: 11 % (ref 4–12)
NEUTS SEG # BLD: 4.3 K/UL (ref 1.7–8.2)
NEUTS SEG NFR BLD: 59 % (ref 43–78)
NRBC # BLD: 0 K/UL (ref 0–0.2)
PLATELET # BLD AUTO: 218 K/UL (ref 150–450)
PMV BLD AUTO: 9.9 FL (ref 9.4–12.3)
POTASSIUM SERPL-SCNC: 4.4 MMOL/L (ref 3.5–5.1)
RBC # BLD AUTO: 4.74 M/UL (ref 4.23–5.6)
SODIUM SERPL-SCNC: 143 MMOL/L (ref 133–143)
VIT B12 SERPL-MCNC: 211 PG/ML (ref 193–986)
WBC # BLD AUTO: 7.2 K/UL (ref 4.3–11.1)

## 2022-11-28 PROCEDURE — 1123F ACP DISCUSS/DSCN MKR DOCD: CPT | Performed by: INTERNAL MEDICINE

## 2022-11-28 PROCEDURE — 4004F PT TOBACCO SCREEN RCVD TLK: CPT | Performed by: INTERNAL MEDICINE

## 2022-11-28 PROCEDURE — 99214 OFFICE O/P EST MOD 30 MIN: CPT | Performed by: INTERNAL MEDICINE

## 2022-11-28 PROCEDURE — 3017F COLORECTAL CA SCREEN DOC REV: CPT | Performed by: INTERNAL MEDICINE

## 2022-11-28 PROCEDURE — G8484 FLU IMMUNIZE NO ADMIN: HCPCS | Performed by: INTERNAL MEDICINE

## 2022-11-28 PROCEDURE — 3074F SYST BP LT 130 MM HG: CPT | Performed by: INTERNAL MEDICINE

## 2022-11-28 PROCEDURE — G0439 PPPS, SUBSEQ VISIT: HCPCS | Performed by: INTERNAL MEDICINE

## 2022-11-28 PROCEDURE — 3078F DIAST BP <80 MM HG: CPT | Performed by: INTERNAL MEDICINE

## 2022-11-28 PROCEDURE — G8427 DOCREV CUR MEDS BY ELIG CLIN: HCPCS | Performed by: INTERNAL MEDICINE

## 2022-11-28 PROCEDURE — G8420 CALC BMI NORM PARAMETERS: HCPCS | Performed by: INTERNAL MEDICINE

## 2022-11-28 RX ORDER — AMLODIPINE BESYLATE 5 MG/1
TABLET ORAL
Qty: 90 TABLET | Refills: 1 | Status: SHIPPED | OUTPATIENT
Start: 2022-11-28

## 2022-11-28 RX ORDER — ROSUVASTATIN CALCIUM 20 MG/1
TABLET, COATED ORAL
Qty: 90 TABLET | Refills: 1 | Status: SHIPPED | OUTPATIENT
Start: 2022-11-28

## 2022-11-28 ASSESSMENT — PATIENT HEALTH QUESTIONNAIRE - PHQ9
SUM OF ALL RESPONSES TO PHQ9 QUESTIONS 1 & 2: 0
1. LITTLE INTEREST OR PLEASURE IN DOING THINGS: 0
SUM OF ALL RESPONSES TO PHQ QUESTIONS 1-9: 0
2. FEELING DOWN, DEPRESSED OR HOPELESS: 0
SUM OF ALL RESPONSES TO PHQ QUESTIONS 1-9: 0

## 2022-11-28 ASSESSMENT — ANXIETY QUESTIONNAIRES
2. NOT BEING ABLE TO STOP OR CONTROL WORRYING: 0
4. TROUBLE RELAXING: 0
5. BEING SO RESTLESS THAT IT IS HARD TO SIT STILL: 0
GAD7 TOTAL SCORE: 0
1. FEELING NERVOUS, ANXIOUS, OR ON EDGE: 0
IF YOU CHECKED OFF ANY PROBLEMS ON THIS QUESTIONNAIRE, HOW DIFFICULT HAVE THESE PROBLEMS MADE IT FOR YOU TO DO YOUR WORK, TAKE CARE OF THINGS AT HOME, OR GET ALONG WITH OTHER PEOPLE: NOT DIFFICULT AT ALL
6. BECOMING EASILY ANNOYED OR IRRITABLE: 0
3. WORRYING TOO MUCH ABOUT DIFFERENT THINGS: 0
7. FEELING AFRAID AS IF SOMETHING AWFUL MIGHT HAPPEN: 0

## 2022-11-28 ASSESSMENT — ENCOUNTER SYMPTOMS
BLOOD IN STOOL: 0
SHORTNESS OF BREATH: 0

## 2022-11-28 NOTE — Clinical Note
Tell patient to arrive 30 minutes prior to their scheduled appointment time. Do not give them either the first or last apt of a half day. Fasting.

## 2022-11-28 NOTE — PROGRESS NOTES
[address] and [address] [facility]   Medical Staff Progress Note     Jazmin Law  MEDICAL RECORD NUMBER:  [id number]  AGE: 81 y.o. GENDER: male  : 1950  EPISODE DATE:  2022    Chief complaint and reason for visit:     Chief Complaint   Patient presents with    Wound Check     Forehead wound      Patient presenting for follow up evaluation of wound(s) per chief complaint. Subjective: Symptoms, wound related issues, or other pertinent wound history since last visit: Patient here regarding left forehead laceration site that was closing with secondary intention. No drainage today. Patient feels like is doing very well. Medical Decision Making:     Problem List Items Addressed This Visit    None      Wounds and Treatment Plan:  It has healed. Patient can change to Vaseline twice a day with no dressing. If any area start draining or concerns happy return. Other associated diagnoses or problems addressed:  None    Pertinent imaging reviewed including independent interpretation include:  None    Pertinent labs reviewed. Medical records and review of external note (s) from other providers done as well. New lab or imaging orders placed:  No new labs    Prescription drug management: N/A     Discussion of management or test interpretation with N/A. Comorbid conditions affecting wound healing: As per PMH which was reviewed. Risk of complications and/or mortality of patient management: This patient has a minimal risk of morbidity and mortality from additional diagnostic testing or treatment. This is due to the above conditions affecting wound healing as well as patient and procedure risk factors. Education and discussion held with patient regarding these disease processes pertinent to wound(s). Other pertinent decisions include: minor surgery or procedures as below.   The patient's diagnosis or treatment is  significantly limited by social determinants of health as noted by: nutritional resource limitations . Wound 11/04/22 Face Left;Upper Forehead (Active)   Wound Image   11/21/22 1418   Wound Etiology Traumatic 11/21/22 1418   Dressing Status Intact 11/21/22 1418   Wound Cleansed Cleansed with saline 11/21/22 1418   Dressing/Treatment Open to air 11/21/22 1418   Wound Length (cm) 0 cm 11/21/22 1418   Wound Width (cm) 0 cm 11/21/22 1418   Wound Depth (cm) 0 cm 11/21/22 1418   Wound Surface Area (cm^2) 0 cm^2 11/21/22 1418   Change in Wound Size % (l*w) 100 11/21/22 1418   Wound Volume (cm^3) 0 cm^3 11/21/22 1418   Wound Healing % 100 11/21/22 1418   Post-Procedure Length (cm) 0.5 cm 11/04/22 1426   Post-Procedure Width (cm) 1.8 cm 11/04/22 1426   Post-Procedure Depth (cm) 0.1 cm 11/04/22 1426   Post-Procedure Surface Area (cm^2) 0.9 cm^2 11/04/22 1426   Post-Procedure Volume (cm^3) 0.09 cm^3 11/04/22 1426   Wound Assessment Epithelialization 11/21/22 1418   Drainage Amount None 11/21/22 1418   Drainage Description Serosanguinous 11/10/22 1613   Odor None 11/21/22 1418   Jennifer-wound Assessment Intact 11/21/22 1418   Margins Attached edges 11/04/22 1426   Wound Thickness Description not for Pressure Injury Full thickness 11/10/22 1613   Number of days: 23          TIME: E/M Time spent with patient and/or patient care issues: [] 15-20 min  [x] 21-30 min  [] 31-44 min  [] 45 min or more.    This is above the usual time needed to address patient's chief complaint today: [] Yes  [] No  This time includes physician non-face-to-face service time visit on the date of service such as  Preparing to see the patient (eg, review of tests)  Obtaining and/or reviewing separately obtained history  Performing a medically necessary appropriate examination and/or evaluation  Counseling and educating the patient/family/caregiver  Ordering medications, tests, or procedures  Referring and communicating with other health care professionals as needed  Documenting clinical information in the electronic or other health record  Independently interpreting results (not reported separately) and communicating results to the patient/family/caregiver  Care coordination (not reported separately)    HISTORY of PRESENT ILLNESS HPI     Howard is a 81 y.o. male who presents today for wound/ulcer evaluation. History of Wound Context: Per original history and physical on this patient. Changes in history since last evaluation: Patient's wound has healed. Discharge from wound care. Objective:    /73   Pulse 87   Temp 97.5 °F (36.4 °C) (Temporal)   Resp 18   Ht 5' 10\" (1.778 m)   Wt 150 lb (68 kg)   BMI 21.52 kg/m²   Wt Readings from Last 3 Encounters:   11/28/22 146 lb (66.2 kg)   11/21/22 150 lb (68 kg)   11/10/22 150 lb (68 kg)       PHYSICAL EXAM  General: Alert and in no acute distress. Normal appearing  Skin: Warm and dry, no rash  Head: Normocephalic and atraumatic  Eyes: Extraocular eye movements intact, conjunctivae normal, and sclera anicteric  ENT: Hearing grossly normal bilaterally. Normal appearance  Respiratory: no chest wall tenderness. no respiratory distress  GI: Abdomen non-tender and benign  Musculoskeletal: Baseline range of motion in joints. Nontender calves. No cyanosis. Edema trace. Neurologic: Speech normal. At baseline without new focal deficits. Mental status normal or at baseline    PAST MEDICAL HISTORY        Diagnosis Date    Adverse effect of anesthesia     shortness of breath when waking, happened once and no other episodes after surgery since.      Chronic obstructive pulmonary disease (HCC)     mild    ED (erectile dysfunction)     Full dentures     GERD (gastroesophageal reflux disease)     seldom    History of skin cancer     non- melanoma skin cancer- removed, left hand    Lung collapse 2012    related to pneumonia caused by Remicade    RA (rheumatoid arthritis) (HCC)     hands, ankles and feet       PAST SURGICAL HISTORY    Past Surgical History:   Procedure Laterality Date    CHEST SURGERY Left 2006    biopsy- lung nodule- benign per pt- yearly CT     CHEST SURGERY Left 2007    VAT (due to Remicade) , benign    CHEST SURGERY Right right    VAT (due to Remicade), due to fluid in lung, benign nodule    COLONOSCOPY  11/18/2019    ORTHOPEDIC SURGERY Bilateral 2013    removal of RA nodules     ORTHOPEDIC SURGERY Bilateral     repair due to collapsed feet       FAMILY HISTORY    Family History   Problem Relation Age of Onset    Cancer Mother     Kidney Disease Father     Hypertension Father     Diabetes Father     Stroke Father     Diabetes Brother     Lung Disease Brother     Liver Disease Mother        SOCIAL HISTORY    Social History     Tobacco Use    Smoking status: Every Day     Packs/day: 1.00     Types: Cigarettes    Smokeless tobacco: Never    Tobacco comments:     Quit smoking: decreased from 1 pk/day x 30 years   Substance Use Topics    Alcohol use: Yes     Alcohol/week: 2.0 standard drinks     Types: 2 Shots of liquor per week    Drug use: No       ALLERGIES    Allergies   Allergen Reactions    Meloxicam Other (See Comments)     Cause BP elevation       MEDICATIONS    Current Outpatient Medications on File Prior to Encounter   Medication Sig Dispense Refill    Adalimumab (HUMIRA PEN) 80 MG/0.8ML PNKT Inject 80 mg into the skin every 14 days 6 each 0    hydroxychloroquine (PLAQUENIL) 200 MG tablet Take 2 pills once a day after food. 180 tablet 1    aspirin 81 MG EC tablet Take 81 mg by mouth daily       No current facility-administered medications on file prior to encounter. REVIEW OF SYSTEMS  A comprehensive review of systems was negative except for what has been indicated above and: Wound is healed. No new issues. Written patient dismissal instructions given to patient and signed by patient or POA.              Electronically signed by Cait Sherman DO on 11/28/2022 at 9:43 AM

## 2022-11-28 NOTE — PATIENT INSTRUCTIONS
Please arrive 20 minutes prior to your scheduled time to see the doctor to allow sufficient time for check-in and rooming. I recommend all standard healthcare maintenance and cancer screenings (Colon cancer screening, Mammogram and Bone Density if female and appropriate, etc) and standard immunizations (Flu, Pneumonia, Covid-19, Tetanus boosters, etc) as appropriate and due to lower your risk for potentially preventable morbidity/mortality from these diseases. Recommend tobacco cessation. Personalized Preventive Plan for Howard - 11/28/2022  Medicare offers a range of preventive health benefits. Some of the tests and screenings are paid in full while other may be subject to a deductible, co-insurance, and/or copay. Some of these benefits include a comprehensive review of your medical history including lifestyle, illnesses that may run in your family, and various assessments and screenings as appropriate. After reviewing your medical record and screening and assessments performed today your provider may have ordered immunizations, labs, imaging, and/or referrals for you. A list of these orders (if applicable) as well as your Preventive Care list are included within your After Visit Summary for your review. Other Preventive Recommendations:    A preventive eye exam performed by an eye specialist is recommended every 1-2 years to screen for glaucoma; cataracts, macular degeneration, and other eye disorders. A preventive dental visit is recommended every 6 months. Try to get at least 150 minutes of exercise per week or 10,000 steps per day on a pedometer . Order or download the FREE \"Exercise & Physical Activity: Your Everyday Guide\" from [employer]. Call 5-660.516.1820 or search [employer] online. You need 0620-9842 mg of calcium and 6470-2716 IU of vitamin D per day.  It is possible to meet your calcium requirement with diet alone, but a vitamin D supplement is usually necessary to meet this goal.  When exposed to the sun, use a sunscreen that protects against both UVA and UVB radiation with an SPF of 30 or greater. Reapply every 2 to 3 hours or after sweating, drying off with a towel, or swimming. Always wear a seat belt when traveling in a car. Always wear a helmet when riding a bicycle or motorcycle.

## 2022-11-28 NOTE — PROGRESS NOTES
Jose Donis M.D. Internal Medicine  [facility]  [address] [facility] - [place], [address]  Phone: [phone]  Fax: [phone]    Hypertension  This is a chronic problem. The current episode started more than 1 year ago. The problem has been waxing and waning since onset. The problem is controlled. Pertinent negatives include no chest pain or shortness of breath. There are no associated agents to hypertension. Risk factors for coronary artery disease include male gender and dyslipidemia. Past treatments include calcium channel blockers. The current treatment provides significant improvement. There are no compliance problems. Other  This is a new (Imbalance.) problem. The current episode started more than 1 month ago. The problem occurs intermittently. The problem has been unchanged. Pertinent negatives include no chest pain. Nothing aggravates the symptoms. He has tried nothing for the symptoms. The treatment provided no relief. Aram Mckinnon is a 81 y.o. White (non-) male. Current Outpatient Medications   Medication Sig Dispense Refill    amLODIPine (NORVASC) 5 MG tablet TAKE 1 TABLET BY MOUTH EVERY DAY 30 tablet 0    rosuvastatin (CRESTOR) 20 MG tablet TAKE 1 TABLET BY MOUTH EVERYDAY AT BEDTIME 90 tablet 0    Adalimumab (HUMIRA PEN) 80 MG/0.8ML PNKT Inject 80 mg into the skin every 14 days 6 each 0    hydroxychloroquine (PLAQUENIL) 200 MG tablet Take 2 pills once a day after food. 180 tablet 1    Adalimumab (HUMIRA PEN) 40 MG/0.4ML PNKT Inject 80 (ONE pen) UNDER THE SKIN every TWO WEEKS      aspirin 81 MG EC tablet Take 81 mg by mouth daily      triamcinolone (KENALOG) 0.1 % cream daily       No current facility-administered medications for this visit.      Allergies   Allergen Reactions    Meloxicam Other (See Comments)     Cause BP elevation     Past Medical History:   Diagnosis Date    Adverse effect of anesthesia     shortness of breath when waking, happened once and no other episodes after surgery since. Chronic obstructive pulmonary disease (HCC)     mild    ED (erectile dysfunction)     Full dentures     GERD (gastroesophageal reflux disease)     seldom    History of skin cancer     non- melanoma skin cancer- removed, left hand    Lung collapse 2012    related to pneumonia caused by Remicade    RA (rheumatoid arthritis) ([facility] [address]     hands, ankles and feet     Past Surgical History:   Procedure Laterality Date    CHEST SURGERY Left 2006    biopsy- lung nodule- benign per pt- yearly CT     CHEST SURGERY Left 2007    VAT (due to Remicade) , benign    CHEST SURGERY Right right    VAT (due to Remicade), due to fluid in lung, benign nodule    COLONOSCOPY  11/18/2019    ORTHOPEDIC SURGERY Bilateral 2013    removal of RA nodules     ORTHOPEDIC SURGERY Bilateral     repair due to collapsed feet     Social History     Tobacco Use    Smoking status: Every Day     Packs/day: 1.00     Types: Cigarettes    Smokeless tobacco: Never    Tobacco comments:     Quit smoking: decreased from 1 pk/day x 30 years   Substance Use Topics    Alcohol use: Yes     Alcohol/week: 2.0 standard drinks     Types: 2 Shots of liquor per week    Drug use: No     Family History   Problem Relation Age of Onset    Cancer Mother     Kidney Disease Father     Hypertension Father     Diabetes Father     Stroke Father     Diabetes Brother     Lung Disease Brother     Liver Disease Mother       Review of Systems   Respiratory:  Negative for shortness of breath. Cardiovascular:  Negative for chest pain. Gastrointestinal:  Negative for blood in stool. Physical Exam  Vitals and nursing note reviewed. Constitutional:       General: He is not in acute distress. Appearance: Normal appearance. He is not ill-appearing, toxic-appearing or diaphoretic. HENT:      Head: Normocephalic and atraumatic.       Right Ear: External ear normal.      Left Ear: External ear normal.   Eyes:      General: No scleral icterus. Right eye: No discharge. Left eye: No discharge. Conjunctiva/sclera: Conjunctivae normal.   Cardiovascular:      Rate and Rhythm: Normal rate and regular rhythm. Heart sounds: Normal heart sounds. No murmur heard. No friction rub. No gallop. Pulmonary:      Effort: Pulmonary effort is normal. No respiratory distress. Breath sounds: Normal breath sounds. No stridor. No wheezing, rhonchi or rales. Abdominal:      General: Abdomen is flat. Bowel sounds are normal. There is no distension. Palpations: Abdomen is soft. There is no mass. Tenderness: There is no abdominal tenderness. There is no guarding or rebound. Musculoskeletal:      Right lower leg: No edema. Left lower leg: No edema. Skin:     General: Skin is warm and dry. Coloration: Skin is not jaundiced or pale. Findings: No erythema. Neurological:      General: No focal deficit present. Mental Status: He is alert and oriented to person, place, and time. Mental status is at baseline. Psychiatric:         Mood and Affect: Mood normal.         Behavior: Behavior normal.         Thought Content: Thought content normal.         Judgment: Judgment normal.      ASSESSMENT AND PLAN:    Imbalance (Undiagnosed new problem with uncertain prognosis):   CAD: Ca Score 11/20/18 = 1035. NM Stress 12/21/18 = Normal perfusion; EF 65%. Follows with Cardiology (Dr. Kali Friedman). Maintained on Asa. Risk factors medically modified per below. Taking medications w/o problems. Well controlled w/o angina or PLAZA. Continue Asa (No bleeding or falls). Continue risk factor modification per below. Follow up with Dr. Kali Friedman. HTN: Taking Current regimen (Norvasc 5) w/o problems. Home BPs = 120-130/70-80. Well controlled. Continue current regimen. Asked to monitor BP at home, call me if it runs above 140/80, and bring in a log next time. HLD: Taking current regimen (Crestor 20) w/o problems.   Well controlled. Continue current regimen. FLPs:  2/2012 Untreated = [183/109/50/122]. 7/25/22 on Crestor 20 = [143/61.4/79/13]. RA: Follows with Rheumatology (Dr. Leatha Luke) on Humira and Plaquenil. Well Controlled. Follow up with Rheumatology. HCM: Recommend tobacco cessation. Colonoscopy: 11/18/19 by Dr. Renan Pacheco = 2 Polyps; 5 Year Repeat Rec (Per an office note). Prostate:  PSA  1/9/13 = 0.5.  11/12/18 = 1.3.  7/25/22 = 0.6.   TDap: 7/2019. Flu: Elsewhere 2022. Covid: Recommend staying UTD on Covid vaccinations/boosters. F/u: 4 Months. Fasting labs at that visit. Brennon was seen today for hypertension, cholesterol problem, other and medicare awv. Diagnoses and all orders for this visit:    Medicare annual wellness visit, subsequent    Imbalance  -     [facility], [facility]  -     Vitamin B12; Future  -     Basic Metabolic Panel; Future  -     CBC with Auto Differential; Future    Coronary artery disease due to calcified coronary lesion  -     rosuvastatin (CRESTOR) 20 MG tablet; TAKE 1 TABLET BY MOUTH EVERYDAY AT BEDTIME  -     Vitamin B12; Future  -     Basic Metabolic Panel; Future  -     CBC with Auto Differential; Future    Essential hypertension  -     amLODIPine (NORVASC) 5 MG tablet; TAKE 1 TABLET BY MOUTH EVERY DAY  -     Vitamin B12; Future  -     Basic Metabolic Panel; Future  -     CBC with Auto Differential; Future    Mixed hyperlipidemia  -     rosuvastatin (CRESTOR) 20 MG tablet; TAKE 1 TABLET BY MOUTH EVERYDAY AT BEDTIME  -     Vitamin B12; Future  -     Basic Metabolic Panel; Future  -     CBC with Auto Differential; Future    Rheumatoid arthritis involving multiple sites with positive rheumatoid factor (HCC)    Long-term use of high-risk medication  -     Vitamin B12; Future  -     Basic Metabolic Panel; Future  -     CBC with Auto Differential; Future      Return in 4 months (on 3/28/2023).     Medicare Annual Wellness Visit    Howard is here for Hypertension, Cholesterol Problem, Other, and Medicare AWV    Assessment & Plan   Medicare annual wellness visit, subsequent  Imbalance  -     [facility] - [facility] Therapy, [address] [place] [address] Internal Clinics  -     Vitamin B12; Future  -     Basic Metabolic Panel; Future  -     CBC with Auto Differential; Future  Coronary artery disease due to calcified coronary lesion  -     rosuvastatin (CRESTOR) 20 MG tablet; TAKE 1 TABLET BY MOUTH EVERYDAY AT BEDTIME, Disp-90 tablet, R-1Normal  -     Vitamin B12; Future  -     Basic Metabolic Panel; Future  -     CBC with Auto Differential; Future  Essential hypertension  -     amLODIPine (NORVASC) 5 MG tablet; TAKE 1 TABLET BY MOUTH EVERY DAY, Disp-90 tablet, R-1Normal  -     Vitamin B12; Future  -     Basic Metabolic Panel; Future  -     CBC with Auto Differential; Future  Mixed hyperlipidemia  -     rosuvastatin (CRESTOR) 20 MG tablet; TAKE 1 TABLET BY MOUTH EVERYDAY AT BEDTIME, Disp-90 tablet, R-1Normal  -     Vitamin B12; Future  -     Basic Metabolic Panel; Future  -     CBC with Auto Differential; Future  Rheumatoid arthritis involving multiple sites with positive rheumatoid factor (HCC)  Long-term use of high-risk medication  -     Vitamin B12; Future  -     Basic Metabolic Panel; Future  -     CBC with Auto Differential; Future    Recommendations for Preventive Services Due: see orders and patient instructions/AVS.  Recommended screening schedule for the next 5-10 years is provided to the patient in written form: see Patient Instructions/AVS.     Return in 4 months (on 3/28/2023). Subjective   The following acute and/or chronic problems were also addressed today:  Imbalance. Patient's complete Health Risk Assessment and screening values have been reviewed and are found in Flowsheets. The following problems were reviewed today and where indicated follow up appointments were made and/or referrals ordered.     Positive Risk Factor Screenings with Interventions: Tobacco Use:  Tobacco Use: High Risk    Smoking Tobacco Use: Every Day    Smokeless Tobacco Use: Never    Passive Exposure: Not on file     E-cigarette/Vaping       Questions Responses    E-cigarette/Vaping Use     Start Date     Passive Exposure     Quit Date     Counseling Given     Comments           Substance Use - Tobacco Interventions:  tobacco cessation tips and resources provided                    Objective   Vitals:    11/28/22 0820   BP: 123/79   Pulse: 89   Temp: 97.8 °F (36.6 °C)   TempSrc: Temporal   SpO2: 98%   Weight: 146 lb (66.2 kg)   Height: 5' 10\" (1.778 m)      Body mass index is 20.95 kg/m². Allergies   Allergen Reactions    Meloxicam Other (See Comments)     Cause BP elevation     Prior to Visit Medications    Medication Sig Taking? Authorizing Provider   amLODIPine (NORVASC) 5 MG tablet TAKE 1 TABLET BY MOUTH EVERY DAY Yes Madan Ibrahim MD   rosuvastatin (CRESTOR) 20 MG tablet TAKE 1 TABLET BY MOUTH EVERYDAY AT BEDTIME Yes Madan Ibrahim MD   Adalimumab (HUMIRA PEN) 80 MG/0.8ML PNKT Inject 80 mg into the skin every 14 days Yes Armen Arcos MD   hydroxychloroquine (PLAQUENIL) 200 MG tablet Take 2 pills once a day after food.  Yes Armen Arcos MD   aspirin 81 MG EC tablet Take 81 mg by mouth daily Yes Ar Automatic Reconciliation   triamcinolone (KENALOG) 0.1 % cream daily Yes Ar Automatic Reconciliation   Adalimumab (HUMIRA PEN) 40 MG/0.4ML PNKT Inject 80 (ONE pen) UNDER THE SKIN every TWO WEEKS  Ar Automatic Reconciliation       CareTeam (Including outside providers/suppliers regularly involved in providing care):   Patient Care Team:  Madan Ibrahim MD as PCP - Pablo Urrutia MD as PCP - [facility] Empaneled Provider     Reviewed and updated this visit:  Tobacco  Allergies  Meds  Problems  Med Hx  Surg Hx  Soc Hx  Fam Hx

## 2023-01-16 ENCOUNTER — TELEPHONE (OUTPATIENT)
Dept: RHEUMATOLOGY | Age: 73
End: 2023-01-16

## 2023-01-16 NOTE — TELEPHONE ENCOUNTER
Called with Dr Mady Hargrove response to his question/concern
He was exposed to covid on Friday he's due for his humira inj today , should he take it or hold off?
I would recommend holding off on administering the Humira shot today and instead doing the Humira shot next Monday if he is not running a fever next Monday.
No

## 2023-01-26 ENCOUNTER — OFFICE VISIT (OUTPATIENT)
Dept: RHEUMATOLOGY | Age: 73
End: 2023-01-26
Payer: MEDICARE

## 2023-01-26 VITALS
BODY MASS INDEX: 21.16 KG/M2 | SYSTOLIC BLOOD PRESSURE: 123 MMHG | HEIGHT: 70 IN | DIASTOLIC BLOOD PRESSURE: 82 MMHG | HEART RATE: 81 BPM | WEIGHT: 147.8 LBS

## 2023-01-26 DIAGNOSIS — M05.79 SEROPOSITIVE RHEUMATOID ARTHRITIS OF MULTIPLE SITES (HCC): Primary | ICD-10-CM

## 2023-01-26 DIAGNOSIS — Z11.1 SCREENING EXAMINATION FOR PULMONARY TUBERCULOSIS: ICD-10-CM

## 2023-01-26 DIAGNOSIS — Z79.899 LONG-TERM USE OF HIGH-RISK MEDICATION: ICD-10-CM

## 2023-01-26 DIAGNOSIS — M05.79 SEROPOSITIVE RHEUMATOID ARTHRITIS OF MULTIPLE SITES (HCC): ICD-10-CM

## 2023-01-26 LAB
ALBUMIN SERPL-MCNC: 3.9 G/DL (ref 3.2–4.6)
ALBUMIN/GLOB SERPL: 0.9 (ref 0.4–1.6)
ALP SERPL-CCNC: 72 U/L (ref 50–136)
ALT SERPL-CCNC: 30 U/L (ref 12–65)
ANION GAP SERPL CALC-SCNC: 6 MMOL/L (ref 2–11)
AST SERPL-CCNC: 34 U/L (ref 15–37)
BASOPHILS # BLD: 0.1 K/UL (ref 0–0.2)
BASOPHILS NFR BLD: 1 % (ref 0–2)
BILIRUB SERPL-MCNC: 0.9 MG/DL (ref 0.2–1.1)
BUN SERPL-MCNC: 11 MG/DL (ref 8–23)
CALCIUM SERPL-MCNC: 9.4 MG/DL (ref 8.3–10.4)
CHLORIDE SERPL-SCNC: 108 MMOL/L (ref 101–110)
CO2 SERPL-SCNC: 29 MMOL/L (ref 21–32)
CREAT SERPL-MCNC: 0.8 MG/DL (ref 0.8–1.5)
CRP SERPL-MCNC: 0.7 MG/DL (ref 0–0.9)
DIFFERENTIAL METHOD BLD: ABNORMAL
EOSINOPHIL # BLD: 0.3 K/UL (ref 0–0.8)
EOSINOPHIL NFR BLD: 5 % (ref 0.5–7.8)
ERYTHROCYTE [DISTWIDTH] IN BLOOD BY AUTOMATED COUNT: 12.8 % (ref 11.9–14.6)
GLOBULIN SER CALC-MCNC: 4.3 G/DL (ref 2.8–4.5)
GLUCOSE SERPL-MCNC: 90 MG/DL (ref 65–100)
HCT VFR BLD AUTO: 46.2 % (ref 41.1–50.3)
HGB BLD-MCNC: 15.8 G/DL (ref 13.6–17.2)
IMM GRANULOCYTES # BLD AUTO: 0 K/UL (ref 0–0.5)
IMM GRANULOCYTES NFR BLD AUTO: 0 % (ref 0–5)
LYMPHOCYTES # BLD: 1.7 K/UL (ref 0.5–4.6)
LYMPHOCYTES NFR BLD: 27 % (ref 13–44)
MCH RBC QN AUTO: 34.3 PG (ref 26.1–32.9)
MCHC RBC AUTO-ENTMCNC: 34.2 G/DL (ref 31.4–35)
MCV RBC AUTO: 100.4 FL (ref 82–102)
MONOCYTES # BLD: 0.9 K/UL (ref 0.1–1.3)
MONOCYTES NFR BLD: 15 % (ref 4–12)
NEUTS SEG # BLD: 3.2 K/UL (ref 1.7–8.2)
NEUTS SEG NFR BLD: 52 % (ref 43–78)
NRBC # BLD: 0 K/UL (ref 0–0.2)
PLATELET # BLD AUTO: 225 K/UL (ref 150–450)
PMV BLD AUTO: 9.9 FL (ref 9.4–12.3)
POTASSIUM SERPL-SCNC: 4.2 MMOL/L (ref 3.5–5.1)
PROT SERPL-MCNC: 8.2 G/DL (ref 6.3–8.2)
RBC # BLD AUTO: 4.6 M/UL (ref 4.23–5.6)
SODIUM SERPL-SCNC: 143 MMOL/L (ref 133–143)
WBC # BLD AUTO: 6.3 K/UL (ref 4.3–11.1)

## 2023-01-26 PROCEDURE — 3074F SYST BP LT 130 MM HG: CPT | Performed by: INTERNAL MEDICINE

## 2023-01-26 PROCEDURE — 99214 OFFICE O/P EST MOD 30 MIN: CPT | Performed by: INTERNAL MEDICINE

## 2023-01-26 PROCEDURE — G8484 FLU IMMUNIZE NO ADMIN: HCPCS | Performed by: INTERNAL MEDICINE

## 2023-01-26 PROCEDURE — 3079F DIAST BP 80-89 MM HG: CPT | Performed by: INTERNAL MEDICINE

## 2023-01-26 PROCEDURE — 1123F ACP DISCUSS/DSCN MKR DOCD: CPT | Performed by: INTERNAL MEDICINE

## 2023-01-26 PROCEDURE — 3017F COLORECTAL CA SCREEN DOC REV: CPT | Performed by: INTERNAL MEDICINE

## 2023-01-26 PROCEDURE — G8427 DOCREV CUR MEDS BY ELIG CLIN: HCPCS | Performed by: INTERNAL MEDICINE

## 2023-01-26 PROCEDURE — 4004F PT TOBACCO SCREEN RCVD TLK: CPT | Performed by: INTERNAL MEDICINE

## 2023-01-26 PROCEDURE — G8420 CALC BMI NORM PARAMETERS: HCPCS | Performed by: INTERNAL MEDICINE

## 2023-01-26 RX ORDER — HYDROXYCHLOROQUINE SULFATE 200 MG/1
TABLET, FILM COATED ORAL
Qty: 180 TABLET | Refills: 1 | Status: SHIPPED | OUTPATIENT
Start: 2023-01-26

## 2023-01-26 RX ORDER — ADALIMUMAB 80MG/0.8ML
80 KIT SUBCUTANEOUS
Qty: 6 EACH | Refills: 1 | Status: SHIPPED | OUTPATIENT
Start: 2023-01-26

## 2023-01-26 ASSESSMENT — ROUTINE ASSESSMENT OF PATIENT INDEX DATA (RAPID3)
ON A SCALE OF ONE TO TEN, HOW DIFFICULT WAS IT FOR YOU TO COMPLETE THE LISTED DAILY PHYSICAL TASKS OVER THE LAST WEEK: 0.3
ON A SCALE OF ONE TO TEN, HOW MUCH OF A PROBLEM HAS UNUSUAL FATIGUE OR TIREDNESS BEEN FOR YOU OVER THE PAST WEEK?: 1
WHEN YOU AWAKENED IN THE MORNING OVER THE LAST WEEK, PLEASE INDICATE THE AMOUNT OF TIME IT TAKES UNTIL YOU ARE AS LIMBER AS YOU WILL BE FOR THE DAY: 30 MIN
ON A SCALE OF ONE TO TEN, HOW MUCH PAIN HAVE YOU HAD BECAUSE OF YOUR CONDITION OVER THE PAST WEEK?: 3
ON A SCALE OF ONE TO TEN, CONSIDERING ALL THE WAYS IN WHICH ILLNESS AND HEALTH CONDITIONS MAY AFFECT YOU AT THIS TIME, PLEASE INDICATE BELOW HOW YOU ARE DOING:: 3

## 2023-01-26 ASSESSMENT — JOINT PAIN
TOTAL NUMBER OF SWOLLEN JOINTS: 0
TOTAL NUMBER OF TENDER JOINTS: 0

## 2023-01-26 NOTE — PROGRESS NOTES
Marley Liriano M.D.  [facility] [address], [facility]  Office : [phone], Fax: [phone] OFFICE VISIT NOTE  Date of Visit:  2023 8:31 AM    Patient Information:  Name:  Sandra Park  :  1950  Age:  81 y.o. Gender:  male      Mr. Joselyn Blanco is here today for follow-up of RA. Last visit: 2022      History of Present Illness: On talking to the patient today he states that he has had some congestion with a slight dry cough but has not had any fever or chills with no shortness of breath either. On his last eye exam in 10/21/22 his ophthalmologist recommended decrement in the dose of Plaquenil and hence he has been alternating 200 mg with 400 mg every other day since then. Patient was advised to stagger his Covid booster in between his Humira shots. He states that he had to skip administering 1 dose of Humira in January since his wife was exposed to [employer] even though his wife and he tested negative after that though. His current joint complaints are as mentioned below. Since the last visit, patient is feeling \"good\". Pain: 3/10  Location:  Some right thumb pain with swelling with no warmth and redness. Some left para spinal muscle pain. Some neck stiffness with some pain with neck ROM. No tension headaches. Occasional lower back pain. Occasional buckling of the right knee with no swelling or pain in it. Some pain in the ball of his feet with no swelling, warmth and redness. Quality:  Sharp pain. Modifying Factors:  Standing and walking worsens the pain in his feet. Associated Symptoms:  Has a weak  with difficulty opening jars with no difficulty buttoning and unbuttoning. No tingling, numbness or pain down the arms or legs. No UE or LE weakness.      DMARD/Biologic 2023   AM Stiffness 30 min   Pain 3   Fatigue 1   MDHAQ 0.3   Patient Global Score 3   Medication Name Humira Medication Name Plaquenil     Last TB screen: 12/8/21  TB result: Negative    Current dose of steroids:none  How long on current dose of steroids:na  How long on continuous steroid therapy:na    Past DMARDs, if applicable (methotrexate, plaquenil/hydroxychloroquine, sulfasalazine, Arava/leflunomide): Currently alternating Plaquenil 200 mg 2 tabs with 1 tablet every other day. Past biologics, if applicable (enbrel, humira, simponi, cimzia, [place], RUBIO, remicade, simponi aria, actemra, rituximab, [place], stelara, cosentyx):Remicade in past d/c due to side effects. Past NSAIDs, if applicable (motrin, aleve, naproxen, advil, ibuprofen, celebrex, voltaren/diclofenac, etc.):celebrex in past non effective. Tramadol       Last BMD:last one done at [address] than 5 years   Past osteoporosis drugs, if applicable (fosamax, actonel, boniva, reclast, prolia, forteo):none    BMI:21.21  Current exercise regimen, if any:deliveries pizzas  Current vitamin D dose:none  Current calcium dose:none  Fractures since last visit, if any: none    The patient otherwise has no significant interval changes in health or medical history to report. History Reviewed:    Past Medical History  Past Medical History:   Diagnosis Date    Adverse effect of anesthesia     shortness of breath when waking, happened once and no other episodes after surgery since.      Chronic obstructive pulmonary disease (HCC)     mild    ED (erectile dysfunction)     Full dentures     GERD (gastroesophageal reflux disease)     seldom    History of skin cancer     non- melanoma skin cancer- removed, left hand    Lung collapse 2012    related to pneumonia caused by Remicade    RA (rheumatoid arthritis) ([facility] [address]     hands, ankles and feet       Past Surgical History  Past Surgical History:   Procedure Laterality Date    CHEST SURGERY Left 2006    biopsy- lung nodule- benign per pt- yearly CT     CHEST SURGERY Left 2007    VAT (due to Remicade) , benign    CHEST SURGERY Right right    VAT (due to Remicade), due to fluid in lung, benign nodule    COLONOSCOPY  11/18/2019    ORTHOPEDIC SURGERY Bilateral 2013    removal of RA nodules     ORTHOPEDIC SURGERY Bilateral     repair due to collapsed feet       Family History  Family History   Problem Relation Age of Onset    Cancer Mother     Kidney Disease Father     Hypertension Father     Diabetes Father     Stroke Father     Diabetes Brother     Lung Disease Brother     Liver Disease Mother        Social History  Social History     Socioeconomic History    Marital status: Life Partner     Spouse name: None    Number of children: None    Years of education: None    Highest education level: None   Tobacco Use    Smoking status: Every Day     Packs/day: 1.00     Types: Cigarettes    Smokeless tobacco: Never    Tobacco comments:     Quit smoking: decreased from 1 pk/day x 30 years   Substance and Sexual Activity    Alcohol use: Yes     Alcohol/week: 2.0 standard drinks     Types: 2 Shots of liquor per week    Drug use: No   Social History Narrative    Lives with girlfriend. Still works. Allergy:  Allergies   Allergen Reactions    Meloxicam Other (See Comments)     Cause BP elevation         Current Medications:  Outpatient Encounter Medications as of 1/26/2023   Medication Sig Dispense Refill    Adalimumab (HUMIRA PEN) 80 MG/0.8ML PNKT Inject 80 mg into the skin every 14 days 6 each 1    hydroxychloroquine (PLAQUENIL) 200 MG tablet Take 2 pills once a day after food. 180 tablet 1    amLODIPine (NORVASC) 5 MG tablet TAKE 1 TABLET BY MOUTH EVERY DAY 90 tablet 1    rosuvastatin (CRESTOR) 20 MG tablet TAKE 1 TABLET BY MOUTH EVERYDAY AT BEDTIME 90 tablet 1    aspirin 81 MG EC tablet Take 81 mg by mouth daily      [DISCONTINUED] Adalimumab (HUMIRA PEN) 80 MG/0.8ML PNKT Inject 80 mg into the skin every 14 days 6 each 0    [DISCONTINUED] hydroxychloroquine (PLAQUENIL) 200 MG tablet Take 2 pills once a day after food. 180 tablet 1     No facility-administered encounter medications on file as of 1/26/2023. REVIEW OF SYSTEMS: The following systems were reviewed with patient today and were negative except for the following (depicted with an \"X\"):        \"X\" General  \"X\" Head and Neck  \"X\" Heart and Breathing  \"X\" Gastrointestinal    Fever/chills   Hair loss   Shortness of breath   Upset stomach    Falls   Dry mouth   Coughing   Diarrhea / constipation    Wt loss   Mouth sores   Wheezing   Heartburn    Wt gain   Ringing ears   Chest pain   Dark or bloody stools    Night sweats   Diff. swallowing  X None of above   Nausea or vomiting   X None of above  X None of above     X None of above                \"X\" Skin  \"X\" Neurology  \"X\" Urinary/Gyn  \"X\" Other    Easy bruising   Numbness/ tingling   Female problems   Depression    Rashes   Weakness   Problems with urination   Feeling anxious   x Sun sensitivity   Headaches  X None of above   Problems sleeping    None of above  X None of above     X None of above          Physical Exam:  Blood pressure 123/82, pulse 81, height 5' 10\" (1.778 m), weight 147 lb 12.8 oz (67 kg). General:  Patient alert, cooperative and in no apparent distress. HEENT: Pupils equally reactive to light and accommodation, minimal scleral injection noted. Heart: Regular rate and rhythm, normal S1 and S2, no rubs or gallops. Lungs: Clear to auscultation bilaterally. Abdomen: Soft, nontender, no hepatosplenomegaly. Skin:  No rashes. No nail abnormalities. Neurologic:  Oriented, normal speech and affect. Normal gait. Extremities:  No edema in bilateral lower extremities with no cyanosis or clubbing. Muskoskeletal Exam:     I examined the shoulders, elbows, wrists, MCPs, PIPs, DIPs and knees bilaterally for strength, range of motion, deformity, tenderness, swelling, and synovitis.       The findings are:       Physical Exam              Joint Exam 01/26/2023        Right  Left   MTP 1   Tender Tender   MTP 2   Tender      MTP 3   Tender   Tender   MTP 4   Tender   Tender   MTP 5   Tender   Tender     cJADAS 10:- 1.12     Patient otherwise has a normal joint exam without other evidence of joint tenderness, synovitis, warmth, erythema, decreased ROM, weakness or deformities.      Radiology Reports Reviewed (if available):  Last 3 months  [unfilled]    Lab Reports Reviewed (if available): Last 3 months    Orders Only on 11/28/2022   Component Date Value Ref Range Status    WBC 11/28/2022 7.2  4.3 - 11.1 K/uL Final    RBC 11/28/2022 4.74  4.23 - 5.6 M/uL Final    Hemoglobin 11/28/2022 16.4  13.6 - 17.2 g/dL Final    Hematocrit 11/28/2022 49.2  41.1 - 50.3 % Final    MCV 11/28/2022 103.8 (A)  82 - 102 FL Final    MCH 11/28/2022 34.6 (A)  26.1 - 32.9 PG Final    MCHC 11/28/2022 33.3  31.4 - 35.0 g/dL Final    RDW 11/28/2022 12.5  11.9 - 14.6 % Final    Platelets 33/74/8947 218  150 - 450 K/uL Final    MPV 11/28/2022 9.9  9.4 - 12.3 FL Final    nRBC 11/28/2022 0.00  0.0 - 0.2 K/uL Final    **Note: Absolute NRBC parameter is now reported with Hemogram**    Differential Type 11/28/2022 AUTOMATED    Final    Seg Neutrophils 11/28/2022 59  43 - 78 % Final    Lymphocytes 11/28/2022 25  13 - 44 % Final    Monocytes 11/28/2022 11  4.0 - 12.0 % Final    Eosinophils % 11/28/2022 4  0.5 - 7.8 % Final    Basophils 11/28/2022 1  0.0 - 2.0 % Final    Immature Granulocytes 11/28/2022 0  0.0 - 5.0 % Final    Segs Absolute 11/28/2022 4.3  1.7 - 8.2 K/UL Final    Absolute Lymph # 11/28/2022 1.8  0.5 - 4.6 K/UL Final    Absolute Mono # 11/28/2022 0.8  0.1 - 1.3 K/UL Final    Absolute Eos # 11/28/2022 0.3  0.0 - 0.8 K/UL Final    Basophils Absolute 11/28/2022 0.1  0.0 - 0.2 K/UL Final    Absolute Immature Granulocyte 11/28/2022 0.0  0.0 - 0.5 K/UL Final    Sodium 11/28/2022 143  133 - 143 mmol/L Final    Potassium 11/28/2022 4.4  3.5 - 5.1 mmol/L Final    Chloride 11/28/2022 109  101 - 110 mmol/L Final    CO2 11/28/2022 30  21 - 32 mmol/L Final    Anion Gap 11/28/2022 4  2 - 11 mmol/L Final    Glucose 11/28/2022 99  65 - 100 mg/dL Final    BUN 11/28/2022 13  8 - 23 MG/DL Final    Creatinine 11/28/2022 0.80  0.8 - 1.5 MG/DL Final    Est, Glom Filt Rate 11/28/2022 >60  >60 ml/min/1.73m2 Final    Comment:   Pediatric calculator link: [email or web address] org/professionals/kdoqi/gfr_calculatorped    Effective Oct 3, 2022    These results are not intended for use in patients <25years of age. eGFR results are calculated without a race factor using  the 2021 CKD-EPI equation. Careful clinical correlation is recommended, particularly when comparing to results calculated using previous equations. The CKD-EPI equation is less accurate in patients with extremes of muscle mass, extra-renal metabolism of creatinine, excessive creatine ingestion, or following therapy that affects renal tubular secretion. Calcium 11/28/2022 9.5  8.3 - 10.4 MG/DL Final    Vitamin B-12 11/28/2022 211  193 - 986 pg/mL Final         The results above were reviewed and discussed with patient. Assessment/Plan:   Mike Sylvester is a 81 y.o. male who presents with:     Seropositive rheumatoid arthritis of multiple sites [facility]): Patient was instructed to continue Humira 80 mg shot to be administered to self every 2 weeks. He is aware that if he is sick requiring him to be on antibiotic or antiviral drug he will need to skip administering the Humira until he has completed the antibiotic/antiviral course and is over the infection. He was instructed to alternate hydroxychloroquine 200 mg with 400 mg every other day for now. His last eye exam from October 2022 did not mention any noted evidence of Plaquenil toxicity.  -     Adalimumab (HUMIRA PEN) 80 MG/0.8ML PNKT; Inject 80 mg into the skin every 14 days  -     hydroxychloroquine (PLAQUENIL) 200 MG tablet; Take 2 pills once a day after food. -     C-Reactive Protein;  Future    Long-term use of high-risk medication: If there is any noted abnormality I will keep the patient informed but if not I will review his labs with him on follow-up. -     Comprehensive Metabolic Panel; Future  -     CBC with Auto Differential; Future    Screening examination for pulmonary tuberculosis: I will keep the patient informed accordingly. -     QuantiFERON In Tube; Future     Disease activity plan:  As stated above. Steroid management plan:  As stated above, if applicable. Pain management plan:  As stated above, if applicable. Weight management plan:  Weight loss through diet and exercise is always encouraged    Disease prognosis: Good    I appreciate the opportunity to continue to participate in the care of this patient. Follow-up and Dispositions    Return in about 4 months (around 5/26/2023). Electronically signed by:  Lila Flores MD      This note was dictated using dragon voice recognition software.   It has been proofread, but there may still exist voice recognition errors that the author did not detect.                --------------------------------------------------------------------------------------------------------------------------------------------------------------------------------------------------------------------------------

## 2023-01-31 LAB
M TB IFN-G BLD-IMP: NEGATIVE
M TB IFN-G CD4+ T-CELLS BLD-ACNC: 0.02 IU/ML
M TBIFN-G CD4+ CD8+T-CELLS BLD-ACNC: 0.21 IU/ML
QUANTIFERON CRITERIA: NORMAL
QUANTIFERON MITOGEN VALUE: 8.46 IU/ML
QUANTIFERON NIL VALUE: 4.44 IU/ML
QUANTIFERON, INCUBATION: NORMAL

## 2023-02-27 ENCOUNTER — OFFICE VISIT (OUTPATIENT)
Dept: CARDIOLOGY CLINIC | Age: 73
End: 2023-02-27

## 2023-02-27 VITALS
DIASTOLIC BLOOD PRESSURE: 60 MMHG | WEIGHT: 148 LBS | BODY MASS INDEX: 21.19 KG/M2 | HEIGHT: 70 IN | SYSTOLIC BLOOD PRESSURE: 110 MMHG | HEART RATE: 79 BPM

## 2023-02-27 DIAGNOSIS — M05.79 RHEUMATOID ARTHRITIS INVOLVING MULTIPLE SITES WITH POSITIVE RHEUMATOID FACTOR (HCC): ICD-10-CM

## 2023-02-27 DIAGNOSIS — E78.2 MIXED HYPERLIPIDEMIA: ICD-10-CM

## 2023-02-27 DIAGNOSIS — I25.84 CORONARY ARTERY DISEASE DUE TO CALCIFIED CORONARY LESION: Primary | ICD-10-CM

## 2023-02-27 DIAGNOSIS — I25.10 CORONARY ARTERY DISEASE DUE TO CALCIFIED CORONARY LESION: Primary | ICD-10-CM

## 2023-02-27 DIAGNOSIS — I10 ESSENTIAL HYPERTENSION: ICD-10-CM

## 2023-02-27 NOTE — PROGRESS NOTES
[facility] CARDIOLOGY, [address], [address]  [place] [address]  PHONE: [phone]    SUBJECTIVE: /HPI  Brennon Saleem (1950) is a 72 y.o. male seen for a follow up visit regarding the following:   Specialty Problems          Cardiology Problems    Essential hypertension        Mixed hyperlipidemia        Coronary artery disease due to calcified coronary lesion         Patient has a prior history of coronary artery disease as noted by elevated coronary calcium score.  He has had prior noninvasive work-up showing no evidence of ischemic flow-limiting disease.  He presents today for continued follow-up.  He remains asymptomatic with no evidence of exertional chest pain.  I do believe he though continues to use cigarettes which is likely has highest risk factor.  Other risk factors such as hypertension and dyslipidemia are currently addressed    Past Medical History, Past Surgical History, Family history, Social History, and Medications were all reviewed with the patient today and updated as necessary.    Allergies   Allergen Reactions    Meloxicam Other (See Comments)     Cause BP elevation     Past Medical History:   Diagnosis Date    Adverse effect of anesthesia     shortness of breath when waking, happened once and no other episodes after surgery since.     Chronic obstructive pulmonary disease (HCC)     mild    ED (erectile dysfunction)     Full dentures     GERD (gastroesophageal reflux disease)     seldom    History of skin cancer     non- melanoma skin cancer- removed, left hand    Lung collapse 2012    related to pneumonia caused by Remicade    RA (rheumatoid arthritis) (HCC)     hands, ankles and feet     Past Surgical History:   Procedure Laterality Date    CHEST SURGERY Left 2006    biopsy- lung nodule- benign per pt- yearly CT     CHEST SURGERY Left 2007    VAT (due to Remicade) , benign    CHEST SURGERY Right right    VAT (due to Remicade), due to fluid in lung, benign nodule     COLONOSCOPY  11/18/2019    ORTHOPEDIC SURGERY Bilateral 2013    removal of RA nodules     ORTHOPEDIC SURGERY Bilateral     repair due to collapsed feet     Family History   Problem Relation Age of Onset    Cancer Mother     Kidney Disease Father     Hypertension Father     Diabetes Father     Stroke Father     Diabetes Brother     Lung Disease Brother     Liver Disease Mother       Social History     Tobacco Use    Smoking status: Every Day     Packs/day: 1.00     Types: Cigarettes    Smokeless tobacco: Never    Tobacco comments:     Quit smoking: decreased from 1 pk/day x 30 years   Substance Use Topics    Alcohol use: Yes     Alcohol/week: 2.0 standard drinks     Types: 2 Shots of liquor per week       Current Outpatient Medications:     Adalimumab (HUMIRA PEN) 80 MG/0.8ML PNKT, Inject 80 mg into the skin every 14 days, Disp: 6 each, Rfl: 1    hydroxychloroquine (PLAQUENIL) 200 MG tablet, Take 2 pills once a day after food. , Disp: 180 tablet, Rfl: 1    amLODIPine (NORVASC) 5 MG tablet, TAKE 1 TABLET BY MOUTH EVERY DAY, Disp: 90 tablet, Rfl: 1    rosuvastatin (CRESTOR) 20 MG tablet, TAKE 1 TABLET BY MOUTH EVERYDAY AT BEDTIME, Disp: 90 tablet, Rfl: 1    aspirin 81 MG EC tablet, Take 81 mg by mouth daily, Disp: , Rfl:     ROS:  Review of Systems - General ROS: negative for - chills, fatigue or fever  Hematological and Lymphatic ROS: negative for - bleeding problems, bruising or jaundice  Respiratory ROS: no cough, shortness of breath, or wheezing  Cardiovascular ROS: no chest pain or dyspnea on exertion  Gastrointestinal ROS: no abdominal pain, change in bowel habits, or black or bloody stools  Neurological ROS: no TIA or stroke symptoms  All other systems negative.     Wt Readings from Last 3 Encounters:   01/26/23 147 lb 12.8 oz (67 kg)   11/28/22 146 lb (66.2 kg)   11/21/22 150 lb (68 kg)     Temp Readings from Last 3 Encounters:   11/28/22 97.8 °F (36.6 °C) (Temporal)   11/21/22 97.5 °F (36.4 °C) (Temporal) 11/04/22 98.4 °F (36.9 °C) (Oral)     BP Readings from Last 3 Encounters:   01/26/23 123/82   11/28/22 123/79   11/21/22 119/73     Pulse Readings from Last 3 Encounters:   01/26/23 81   11/28/22 89   11/21/22 87       PHYSICAL EXAM:  There were no vitals taken for this visit. Physical Examination: General appearance - alert, well appearing, and in no distress  Mental status - alert, oriented to person, place, and time  Eyes - pupils equal and reactive, extraocular eye movements intact  Neck/lymph - supple, no significant adenopathy  Chest/lungs - clear to auscultation, no wheezes, rales or rhonchi, symmetric air entry  Heart/CV - normal rate, regular rhythm, normal S1, S2, no murmurs, rubs, clicks or gallops  Abdomen/GI - soft, nontender, nondistended, no masses or organomegaly  Musculoskeletal - no joint tenderness, deformity or swelling  Extremities - peripheral pulses normal, no pedal edema, no clubbing or cyanosis  Skin - normal coloration and turgor, no rashes, no suspicious skin lesions noted    EKG: normal EKG, normal sinus rhythm, nonspecific ST and T waves changes. Medications reviewed and questions answered    No results found for this or any previous visit (from the past 672 hour(s)). Lab Results   Component Value Date/Time    CHOL 143 07/25/2022 10:00 AM    HDL 79 07/25/2022 10:00 AM    VLDL 11 11/23/2021 08:21 AM       ASSESSMENT and PLAN  No follow-up provider specified.  is for   Specialty Problems          Cardiology Problems    Essential hypertension        Mixed hyperlipidemia        Coronary artery disease due to calcified coronary lesion            PLAN:  Problem List Items Addressed This Visit          Circulatory    Coronary artery disease due to calcified coronary lesion - Primary    Essential hypertension       Other    Mixed hyperlipidemia    Rheumatoid arthritis involving multiple sites with positive rheumatoid factor (HCC)      Artery disease  Continue risk factor modification and have encouraged patient to consider stopping smoking. With no symptoms there is no need for an ischemic work-up at this time we can continue with medical therapy    Dyslipidemia  Appropriately addressed on moderate intensity statin therapy. Hypertension  Addressed on Norvasc. Follow-up for the above conditions in 6 months    Continue meds as below    Current Outpatient Medications:     Adalimumab (HUMIRA PEN) 80 MG/0.8ML PNKT, Inject 80 mg into the skin every 14 days, Disp: 6 each, Rfl: 1    hydroxychloroquine (PLAQUENIL) 200 MG tablet, Take 2 pills once a day after food. , Disp: 180 tablet, Rfl: 1    amLODIPine (NORVASC) 5 MG tablet, TAKE 1 TABLET BY MOUTH EVERY DAY, Disp: 90 tablet, Rfl: 1    rosuvastatin (CRESTOR) 20 MG tablet, TAKE 1 TABLET BY MOUTH EVERYDAY AT BEDTIME, Disp: 90 tablet, Rfl: 1    aspirin 81 MG EC tablet, Take 81 mg by mouth daily, Disp: , Rfl:     Leandra Baltazar MD  2/27/2023  7:39 AM    Pt is instructed to follow all appropriate cardiac risk factor recommendations and to be compliant with meds and testing. Instructed to follow up appropriately and seek urgent medical care if acute or unstable issues arise.  Results of some tests may be viewed thru [address] but this does not substitute for follow up with MD. If follow up is not scheduled pt is instructed to call for follow up

## 2023-03-29 ENCOUNTER — OFFICE VISIT (OUTPATIENT)
Dept: INTERNAL MEDICINE CLINIC | Facility: CLINIC | Age: 73
End: 2023-03-29
Payer: MEDICARE

## 2023-03-29 VITALS
BODY MASS INDEX: 21.47 KG/M2 | SYSTOLIC BLOOD PRESSURE: 119 MMHG | HEIGHT: 70 IN | WEIGHT: 150 LBS | HEART RATE: 74 BPM | DIASTOLIC BLOOD PRESSURE: 77 MMHG | TEMPERATURE: 98.1 F | OXYGEN SATURATION: 99 %

## 2023-03-29 DIAGNOSIS — M05.79 RHEUMATOID ARTHRITIS INVOLVING MULTIPLE SITES WITH POSITIVE RHEUMATOID FACTOR (HCC): ICD-10-CM

## 2023-03-29 DIAGNOSIS — I25.10 CORONARY ARTERY DISEASE DUE TO CALCIFIED CORONARY LESION: Primary | ICD-10-CM

## 2023-03-29 DIAGNOSIS — I25.84 CORONARY ARTERY DISEASE DUE TO CALCIFIED CORONARY LESION: ICD-10-CM

## 2023-03-29 DIAGNOSIS — I10 ESSENTIAL HYPERTENSION: ICD-10-CM

## 2023-03-29 DIAGNOSIS — E78.2 MIXED HYPERLIPIDEMIA: ICD-10-CM

## 2023-03-29 DIAGNOSIS — I25.84 CORONARY ARTERY DISEASE DUE TO CALCIFIED CORONARY LESION: Primary | ICD-10-CM

## 2023-03-29 DIAGNOSIS — I25.10 CORONARY ARTERY DISEASE DUE TO CALCIFIED CORONARY LESION: ICD-10-CM

## 2023-03-29 LAB
ALBUMIN SERPL-MCNC: 4 G/DL (ref 3.2–4.6)
ALBUMIN/GLOB SERPL: 0.9 (ref 0.4–1.6)
ALP SERPL-CCNC: 79 U/L (ref 50–136)
ALT SERPL-CCNC: 24 U/L (ref 12–65)
ANION GAP SERPL CALC-SCNC: ABNORMAL MMOL/L (ref 2–11)
APPEARANCE UR: CLEAR
AST SERPL-CCNC: 21 U/L (ref 15–37)
BASOPHILS # BLD: 0.1 K/UL (ref 0–0.2)
BASOPHILS NFR BLD: 1 % (ref 0–2)
BILIRUB DIRECT SERPL-MCNC: 0.2 MG/DL
BILIRUB SERPL-MCNC: 0.9 MG/DL (ref 0.2–1.1)
BILIRUB UR QL: NEGATIVE
BUN SERPL-MCNC: 13 MG/DL (ref 8–23)
CALCIUM SERPL-MCNC: 9.5 MG/DL (ref 8.3–10.4)
CHLORIDE SERPL-SCNC: 107 MMOL/L (ref 101–110)
CHOLEST SERPL-MCNC: 139 MG/DL
CO2 SERPL-SCNC: 31 MMOL/L (ref 21–32)
COLOR UR: NORMAL
CREAT SERPL-MCNC: 0.8 MG/DL (ref 0.8–1.5)
DIFFERENTIAL METHOD BLD: ABNORMAL
EOSINOPHIL # BLD: 0.4 K/UL (ref 0–0.8)
EOSINOPHIL NFR BLD: 5 % (ref 0.5–7.8)
ERYTHROCYTE [DISTWIDTH] IN BLOOD BY AUTOMATED COUNT: 12.5 % (ref 11.9–14.6)
GLOBULIN SER CALC-MCNC: 4.4 G/DL (ref 2.8–4.5)
GLUCOSE SERPL-MCNC: 101 MG/DL (ref 65–100)
GLUCOSE UR STRIP.AUTO-MCNC: NEGATIVE MG/DL
HCT VFR BLD AUTO: 47 % (ref 41.1–50.3)
HDLC SERPL-MCNC: 92 MG/DL (ref 40–60)
HDLC SERPL: 1.5
HGB BLD-MCNC: 16 G/DL (ref 13.6–17.2)
HGB UR QL STRIP: NEGATIVE
IMM GRANULOCYTES # BLD AUTO: 0 K/UL (ref 0–0.5)
IMM GRANULOCYTES NFR BLD AUTO: 0 % (ref 0–5)
KETONES UR QL STRIP.AUTO: NEGATIVE MG/DL
LDLC SERPL CALC-MCNC: 36.6 MG/DL
LEUKOCYTE ESTERASE UR QL STRIP.AUTO: NEGATIVE
LYMPHOCYTES # BLD: 1.9 K/UL (ref 0.5–4.6)
LYMPHOCYTES NFR BLD: 24 % (ref 13–44)
MCH RBC QN AUTO: 34 PG (ref 26.1–32.9)
MCHC RBC AUTO-ENTMCNC: 34 G/DL (ref 31.4–35)
MCV RBC AUTO: 99.8 FL (ref 82–102)
MONOCYTES # BLD: 0.9 K/UL (ref 0.1–1.3)
MONOCYTES NFR BLD: 11 % (ref 4–12)
NEUTS SEG # BLD: 4.5 K/UL (ref 1.7–8.2)
NEUTS SEG NFR BLD: 59 % (ref 43–78)
NITRITE UR QL STRIP.AUTO: NEGATIVE
NRBC # BLD: 0 K/UL (ref 0–0.2)
PH UR STRIP: 5.5 (ref 5–9)
PLATELET # BLD AUTO: 220 K/UL (ref 150–450)
PMV BLD AUTO: 9.7 FL (ref 9.4–12.3)
POTASSIUM SERPL-SCNC: 4.3 MMOL/L (ref 3.5–5.1)
PROT SERPL-MCNC: 8.4 G/DL (ref 6.3–8.2)
PROT UR STRIP-MCNC: NEGATIVE MG/DL
RBC # BLD AUTO: 4.71 M/UL (ref 4.23–5.6)
SODIUM SERPL-SCNC: 137 MMOL/L (ref 133–143)
SP GR UR REFRACTOMETRY: 1.02 (ref 1–1.02)
TRIGL SERPL-MCNC: 52 MG/DL (ref 35–150)
TSH, 3RD GENERATION: 1.49 UIU/ML (ref 0.36–3.74)
UROBILINOGEN UR QL STRIP.AUTO: 0.2 EU/DL (ref 0.2–1)
VLDLC SERPL CALC-MCNC: 10.4 MG/DL (ref 6–23)
WBC # BLD AUTO: 7.8 K/UL (ref 4.3–11.1)

## 2023-03-29 PROCEDURE — 3017F COLORECTAL CA SCREEN DOC REV: CPT | Performed by: INTERNAL MEDICINE

## 2023-03-29 PROCEDURE — G8484 FLU IMMUNIZE NO ADMIN: HCPCS | Performed by: INTERNAL MEDICINE

## 2023-03-29 PROCEDURE — 3078F DIAST BP <80 MM HG: CPT | Performed by: INTERNAL MEDICINE

## 2023-03-29 PROCEDURE — 1123F ACP DISCUSS/DSCN MKR DOCD: CPT | Performed by: INTERNAL MEDICINE

## 2023-03-29 PROCEDURE — G8427 DOCREV CUR MEDS BY ELIG CLIN: HCPCS | Performed by: INTERNAL MEDICINE

## 2023-03-29 PROCEDURE — 3074F SYST BP LT 130 MM HG: CPT | Performed by: INTERNAL MEDICINE

## 2023-03-29 PROCEDURE — G8420 CALC BMI NORM PARAMETERS: HCPCS | Performed by: INTERNAL MEDICINE

## 2023-03-29 PROCEDURE — 4004F PT TOBACCO SCREEN RCVD TLK: CPT | Performed by: INTERNAL MEDICINE

## 2023-03-29 PROCEDURE — 99214 OFFICE O/P EST MOD 30 MIN: CPT | Performed by: INTERNAL MEDICINE

## 2023-03-29 RX ORDER — KETOCONAZOLE 20 MG/ML
SHAMPOO TOPICAL
COMMUNITY
Start: 2023-03-14

## 2023-03-29 SDOH — ECONOMIC STABILITY: FOOD INSECURITY: WITHIN THE PAST 12 MONTHS, THE FOOD YOU BOUGHT JUST DIDN'T LAST AND YOU DIDN'T HAVE MONEY TO GET MORE.: NEVER TRUE

## 2023-03-29 SDOH — ECONOMIC STABILITY: INCOME INSECURITY: HOW HARD IS IT FOR YOU TO PAY FOR THE VERY BASICS LIKE FOOD, HOUSING, MEDICAL CARE, AND HEATING?: NOT HARD AT ALL

## 2023-03-29 SDOH — ECONOMIC STABILITY: HOUSING INSECURITY
IN THE LAST 12 MONTHS, WAS THERE A TIME WHEN YOU DID NOT HAVE A STEADY PLACE TO SLEEP OR SLEPT IN A SHELTER (INCLUDING NOW)?: NO

## 2023-03-29 SDOH — ECONOMIC STABILITY: FOOD INSECURITY: WITHIN THE PAST 12 MONTHS, YOU WORRIED THAT YOUR FOOD WOULD RUN OUT BEFORE YOU GOT MONEY TO BUY MORE.: NEVER TRUE

## 2023-03-29 ASSESSMENT — ANXIETY QUESTIONNAIRES
6. BECOMING EASILY ANNOYED OR IRRITABLE: 0
7. FEELING AFRAID AS IF SOMETHING AWFUL MIGHT HAPPEN: 0
4. TROUBLE RELAXING: 0
1. FEELING NERVOUS, ANXIOUS, OR ON EDGE: 0
2. NOT BEING ABLE TO STOP OR CONTROL WORRYING: 0
5. BEING SO RESTLESS THAT IT IS HARD TO SIT STILL: 0
IF YOU CHECKED OFF ANY PROBLEMS ON THIS QUESTIONNAIRE, HOW DIFFICULT HAVE THESE PROBLEMS MADE IT FOR YOU TO DO YOUR WORK, TAKE CARE OF THINGS AT HOME, OR GET ALONG WITH OTHER PEOPLE: NOT DIFFICULT AT ALL
3. WORRYING TOO MUCH ABOUT DIFFERENT THINGS: 0
GAD7 TOTAL SCORE: 0

## 2023-03-29 ASSESSMENT — PATIENT HEALTH QUESTIONNAIRE - PHQ9
SUM OF ALL RESPONSES TO PHQ9 QUESTIONS 1 & 2: 0
SUM OF ALL RESPONSES TO PHQ QUESTIONS 1-9: 0
1. LITTLE INTEREST OR PLEASURE IN DOING THINGS: 0
SUM OF ALL RESPONSES TO PHQ QUESTIONS 1-9: 0
2. FEELING DOWN, DEPRESSED OR HOPELESS: 0

## 2023-03-29 ASSESSMENT — ENCOUNTER SYMPTOMS
SHORTNESS OF BREATH: 0
BLOOD IN STOOL: 0

## 2023-03-29 NOTE — PATIENT INSTRUCTIONS
Please arrive 20 minutes prior to your scheduled time to see the doctor to allow sufficient time for check-in and rooming. I recommend all standard healthcare maintenance and cancer screenings (Colon cancer screening, Mammogram and Bone Density if female and appropriate, etc) and standard immunizations (Flu, Pneumonia, Covid-19, Tetanus boosters, etc) as appropriate and due to lower your risk for potentially preventable morbidity/mortality from these diseases. Recommend tobacco cessation.

## 2023-03-29 NOTE — PROGRESS NOTES
Natalie Avendano M.D. Internal Medicine  [facility]  [address] [facility], [address]  Phone: [phone]  Fax: [phone]    Hypertension  This is a chronic problem. The current episode started more than 1 year ago. The problem has been waxing and waning since onset. The problem is controlled. Pertinent negatives include no chest pain or shortness of breath. There are no associated agents to hypertension. Risk factors for coronary artery disease include male gender and dyslipidemia. Past treatments include calcium channel blockers. The current treatment provides significant improvement. There are no compliance problems. Hypertensive end-organ damage includes CAD/MI. Fide Monique is a 81 y.o. White (non-) male. Current Outpatient Medications   Medication Sig Dispense Refill    ketoconazole (NIZORAL) 2 % shampoo APPLY TO FACE EVERY DAY      Multiple Vitamins-Minerals (MULTIVITAL PO) Take by mouth daily      Adalimumab (HUMIRA PEN) 80 MG/0.8ML PNKT Inject 80 mg into the skin every 14 days 6 each 1    hydroxychloroquine (PLAQUENIL) 200 MG tablet Take 2 pills once a day after food. (Patient taking differently: 1-2 tablets daily) 180 tablet 1    amLODIPine (NORVASC) 5 MG tablet TAKE 1 TABLET BY MOUTH EVERY DAY 90 tablet 1    rosuvastatin (CRESTOR) 20 MG tablet TAKE 1 TABLET BY MOUTH EVERYDAY AT BEDTIME 90 tablet 1    aspirin 81 MG EC tablet Take 81 mg by mouth daily       No current facility-administered medications for this visit. Allergies   Allergen Reactions    Meloxicam Other (See Comments)     Cause BP elevation     Past Medical History:   Diagnosis Date    Adverse effect of anesthesia     shortness of breath when waking, happened once and no other episodes after surgery since.      Chronic obstructive pulmonary disease (HCC)     mild    ED (erectile dysfunction)     Full dentures     GERD (gastroesophageal reflux disease)     seldom    History of skin cancer

## 2023-03-30 LAB
EST. AVERAGE GLUCOSE BLD GHB EST-MCNC: 94 MG/DL
EST. AVERAGE GLUCOSE BLD GHB EST-MCNC: 94 MG/DL
HBA1C MFR BLD: 4.9 % (ref 4.8–5.6)
HBA1C MFR BLD: 4.9 % (ref 4.8–5.6)

## 2023-05-16 ENCOUNTER — TELEPHONE (OUTPATIENT)
Dept: RHEUMATOLOGY | Age: 73
End: 2023-05-16

## 2023-05-16 DIAGNOSIS — M05.79 SEROPOSITIVE RHEUMATOID ARTHRITIS OF MULTIPLE SITES (HCC): Primary | ICD-10-CM

## 2023-05-16 NOTE — TELEPHONE ENCOUNTER
PC having increased wrist pain and stiffness , wants burst [place] pred dose pk called in to his local pharmacy

## 2023-05-17 RX ORDER — PREDNISONE 20 MG/1
TABLET ORAL
Qty: 11 TABLET | Refills: 0 | Status: SHIPPED | OUTPATIENT
Start: 2023-05-17

## 2023-05-26 ENCOUNTER — OFFICE VISIT (OUTPATIENT)
Dept: RHEUMATOLOGY | Age: 73
End: 2023-05-26
Payer: MEDICARE

## 2023-05-26 VITALS
BODY MASS INDEX: 21.62 KG/M2 | HEIGHT: 70 IN | WEIGHT: 151 LBS | HEART RATE: 80 BPM | DIASTOLIC BLOOD PRESSURE: 80 MMHG | SYSTOLIC BLOOD PRESSURE: 119 MMHG

## 2023-05-26 DIAGNOSIS — Z76.89 ENCOUNTER PRIOR TO INITIATION OF MEDICATION: ICD-10-CM

## 2023-05-26 DIAGNOSIS — R53.1 WEAKNESS: ICD-10-CM

## 2023-05-26 DIAGNOSIS — M05.79 SEROPOSITIVE RHEUMATOID ARTHRITIS OF MULTIPLE SITES (HCC): Primary | ICD-10-CM

## 2023-05-26 PROCEDURE — 3074F SYST BP LT 130 MM HG: CPT | Performed by: INTERNAL MEDICINE

## 2023-05-26 PROCEDURE — 4004F PT TOBACCO SCREEN RCVD TLK: CPT | Performed by: INTERNAL MEDICINE

## 2023-05-26 PROCEDURE — G8427 DOCREV CUR MEDS BY ELIG CLIN: HCPCS | Performed by: INTERNAL MEDICINE

## 2023-05-26 PROCEDURE — 3017F COLORECTAL CA SCREEN DOC REV: CPT | Performed by: INTERNAL MEDICINE

## 2023-05-26 PROCEDURE — G8420 CALC BMI NORM PARAMETERS: HCPCS | Performed by: INTERNAL MEDICINE

## 2023-05-26 PROCEDURE — 1123F ACP DISCUSS/DSCN MKR DOCD: CPT | Performed by: INTERNAL MEDICINE

## 2023-05-26 PROCEDURE — 99213 OFFICE O/P EST LOW 20 MIN: CPT | Performed by: INTERNAL MEDICINE

## 2023-05-26 PROCEDURE — 3078F DIAST BP <80 MM HG: CPT | Performed by: INTERNAL MEDICINE

## 2023-05-26 RX ORDER — ADALIMUMAB 80MG/0.8ML
80 KIT SUBCUTANEOUS
Qty: 6 EACH | Refills: 1 | Status: SHIPPED | OUTPATIENT
Start: 2023-05-26

## 2023-05-26 RX ORDER — HYDROXYCHLOROQUINE SULFATE 200 MG/1
TABLET, FILM COATED ORAL
Qty: 180 TABLET | Refills: 1 | Status: SHIPPED | OUTPATIENT
Start: 2023-05-26

## 2023-05-26 ASSESSMENT — ROUTINE ASSESSMENT OF PATIENT INDEX DATA (RAPID3)
ON A SCALE OF ONE TO TEN, HOW DIFFICULT WAS IT FOR YOU TO COMPLETE THE LISTED DAILY PHYSICAL TASKS OVER THE LAST WEEK: 0.4
ON A SCALE OF ONE TO TEN, CONSIDERING ALL THE WAYS IN WHICH ILLNESS AND HEALTH CONDITIONS MAY AFFECT YOU AT THIS TIME, PLEASE INDICATE BELOW HOW YOU ARE DOING:: 5
ON A SCALE OF ONE TO TEN, HOW MUCH PAIN HAVE YOU HAD BECAUSE OF YOUR CONDITION OVER THE PAST WEEK?: 5
ON A SCALE OF ONE TO TEN, HOW MUCH OF A PROBLEM HAS UNUSUAL FATIGUE OR TIREDNESS BEEN FOR YOU OVER THE PAST WEEK?: 5
WHEN YOU AWAKENED IN THE MORNING OVER THE LAST WEEK, PLEASE INDICATE THE AMOUNT OF TIME IT TAKES UNTIL YOU ARE AS LIMBER AS YOU WILL BE FOR THE DAY: 45 MIN

## 2023-05-26 ASSESSMENT — JOINT PAIN
TOTAL NUMBER OF TENDER JOINTS: 0
TOTAL NUMBER OF SWOLLEN JOINTS: 2

## 2023-05-26 NOTE — PROGRESS NOTES
dose: 74 mGy-cm    Comparison:  Chest CT 9/8/2021. Findings:   Note: A negative screening exam does not mean an individual does not have lung  cancer. Images through the lungs demonstrate a stable 2 mm nodule along the posterior  margin of the right upper lobe, as well as 2 stable right posterior lower lobe  subpleural nodules measuring 7 mm and 5 mm respectively, and a peripheral left  lung base nodule measuring 2 mm. There is otherwise progressive chronic  interstitial fibrosis, which is most pronounced at the lung bases. No effusions  are identified. Normal vasculature is demonstrated allowing for noncontrast examination. No  evidence of aortic aneurysmal dilatation is seen. The heart and mediastinum are grossly unremarkable. No significant hilar or  mediastinal lymphadenopathy is demonstrated. Images chest wall structures as well as visualized portions of the upper abdomen   are unremarkable in appearance. There are no acute osseous abnormalities. Impression: 1. Stable small pulmonary nodules likely benign with chronic interstitial  fibrotic changes worse at the lung bases. LUNG RADS CATEGORY: L2 Benign appearance or behavior:    Solid nodule(s):  <6mm OR new <4mm  Part solid nodule(s):   <6mm total diameter on baseline screening  Non solid nodule(s):   <20mm OR >=20mm and unchanged or slowly growing   Category 3 or 4 nodules unchanged for >=3 months    RECOMMENDATION:  L2: Continue annual screening with noncontrast chest CT using  LDCT protocol in 12 months.     ADDITIONAL MODIFIERS:  None     Lab Reports Reviewed (if available): Last 3 months    Orders Only on 03/29/2023   Component Date Value Ref Range Status    Hemoglobin A1C 03/29/2023 4.9  4.8 - 5.6 % Final    eAG 03/29/2023 94  mg/dL Final    Comment: Reference Range  Normal: 4.8-5.6  Diabetic >=6.5%  Normal       <5.7%     Orders Only on 03/29/2023   Component Date Value Ref Range Status    Color, UA 03/29/2023 YELLOW/STRAW    Final

## 2023-05-27 LAB
HAV IGM SER QL: NONREACTIVE
HBV CORE IGM SER QL: NONREACTIVE
HBV SURFACE AG SER QL: NONREACTIVE
HCV AB SER QL: NONREACTIVE

## 2023-06-10 DIAGNOSIS — I25.10 CORONARY ARTERY DISEASE DUE TO CALCIFIED CORONARY LESION: ICD-10-CM

## 2023-06-10 DIAGNOSIS — E78.2 MIXED HYPERLIPIDEMIA: ICD-10-CM

## 2023-06-10 DIAGNOSIS — I10 ESSENTIAL HYPERTENSION: ICD-10-CM

## 2023-06-10 DIAGNOSIS — I25.84 CORONARY ARTERY DISEASE DUE TO CALCIFIED CORONARY LESION: ICD-10-CM

## 2023-06-12 RX ORDER — AMLODIPINE BESYLATE 5 MG/1
TABLET ORAL
Qty: 90 TABLET | Refills: 0 | Status: SHIPPED | OUTPATIENT
Start: 2023-06-12

## 2023-06-12 RX ORDER — ROSUVASTATIN CALCIUM 20 MG/1
20 TABLET, COATED ORAL EVERY EVENING
Qty: 90 TABLET | Refills: 0 | Status: SHIPPED | OUTPATIENT
Start: 2023-06-12

## 2023-07-30 ASSESSMENT — PATIENT HEALTH QUESTIONNAIRE - PHQ9
SUM OF ALL RESPONSES TO PHQ QUESTIONS 1-9: 0
1. LITTLE INTEREST OR PLEASURE IN DOING THINGS: NOT AT ALL
SUM OF ALL RESPONSES TO PHQ9 QUESTIONS 1 & 2: 0
1. LITTLE INTEREST OR PLEASURE IN DOING THINGS: 0
SUM OF ALL RESPONSES TO PHQ QUESTIONS 1-9: 0
SUM OF ALL RESPONSES TO PHQ9 QUESTIONS 1 & 2: 0
SUM OF ALL RESPONSES TO PHQ QUESTIONS 1-9: 0
2. FEELING DOWN, DEPRESSED OR HOPELESS: NOT AT ALL
SUM OF ALL RESPONSES TO PHQ QUESTIONS 1-9: 0
2. FEELING DOWN, DEPRESSED OR HOPELESS: 0

## 2023-08-02 ENCOUNTER — OFFICE VISIT (OUTPATIENT)
Dept: INTERNAL MEDICINE CLINIC | Facility: CLINIC | Age: 73
End: 2023-08-02
Payer: MEDICARE

## 2023-08-02 VITALS
HEART RATE: 99 BPM | BODY MASS INDEX: 21.47 KG/M2 | OXYGEN SATURATION: 97 % | TEMPERATURE: 97.2 F | DIASTOLIC BLOOD PRESSURE: 87 MMHG | WEIGHT: 150 LBS | HEIGHT: 70 IN | SYSTOLIC BLOOD PRESSURE: 118 MMHG

## 2023-08-02 DIAGNOSIS — Z87.891 PERSONAL HISTORY OF TOBACCO USE: ICD-10-CM

## 2023-08-02 DIAGNOSIS — I25.10 CORONARY ARTERY DISEASE DUE TO CALCIFIED CORONARY LESION: Primary | ICD-10-CM

## 2023-08-02 DIAGNOSIS — M05.79 RHEUMATOID ARTHRITIS INVOLVING MULTIPLE SITES WITH POSITIVE RHEUMATOID FACTOR (HCC): ICD-10-CM

## 2023-08-02 DIAGNOSIS — I25.84 CORONARY ARTERY DISEASE DUE TO CALCIFIED CORONARY LESION: Primary | ICD-10-CM

## 2023-08-02 DIAGNOSIS — Z12.5 PROSTATE CANCER SCREENING: ICD-10-CM

## 2023-08-02 DIAGNOSIS — I10 ESSENTIAL HYPERTENSION: ICD-10-CM

## 2023-08-02 DIAGNOSIS — E78.2 MIXED HYPERLIPIDEMIA: ICD-10-CM

## 2023-08-02 DIAGNOSIS — N52.9 IMPOTENCE DUE TO ERECTILE DYSFUNCTION: ICD-10-CM

## 2023-08-02 LAB — PSA SERPL-MCNC: 0.7 NG/ML

## 2023-08-02 PROCEDURE — 4004F PT TOBACCO SCREEN RCVD TLK: CPT | Performed by: INTERNAL MEDICINE

## 2023-08-02 PROCEDURE — 3074F SYST BP LT 130 MM HG: CPT | Performed by: INTERNAL MEDICINE

## 2023-08-02 PROCEDURE — G0296 VISIT TO DETERM LDCT ELIG: HCPCS | Performed by: INTERNAL MEDICINE

## 2023-08-02 PROCEDURE — 3079F DIAST BP 80-89 MM HG: CPT | Performed by: INTERNAL MEDICINE

## 2023-08-02 PROCEDURE — G8420 CALC BMI NORM PARAMETERS: HCPCS | Performed by: INTERNAL MEDICINE

## 2023-08-02 PROCEDURE — G8427 DOCREV CUR MEDS BY ELIG CLIN: HCPCS | Performed by: INTERNAL MEDICINE

## 2023-08-02 PROCEDURE — 1123F ACP DISCUSS/DSCN MKR DOCD: CPT | Performed by: INTERNAL MEDICINE

## 2023-08-02 PROCEDURE — 99214 OFFICE O/P EST MOD 30 MIN: CPT | Performed by: INTERNAL MEDICINE

## 2023-08-02 PROCEDURE — 3017F COLORECTAL CA SCREEN DOC REV: CPT | Performed by: INTERNAL MEDICINE

## 2023-08-02 RX ORDER — SILDENAFIL 100 MG/1
100 TABLET, FILM COATED ORAL DAILY PRN
Qty: 10 TABLET | Refills: 5 | Status: SHIPPED | OUTPATIENT
Start: 2023-08-02

## 2023-08-02 RX ORDER — TRIAMCINOLONE ACETONIDE 1 MG/G
CREAM TOPICAL
COMMUNITY
Start: 2023-05-22

## 2023-08-02 SDOH — ECONOMIC STABILITY: FOOD INSECURITY: WITHIN THE PAST 12 MONTHS, THE FOOD YOU BOUGHT JUST DIDN'T LAST AND YOU DIDN'T HAVE MONEY TO GET MORE.: NEVER TRUE

## 2023-08-02 SDOH — ECONOMIC STABILITY: INCOME INSECURITY: HOW HARD IS IT FOR YOU TO PAY FOR THE VERY BASICS LIKE FOOD, HOUSING, MEDICAL CARE, AND HEATING?: NOT HARD AT ALL

## 2023-08-02 SDOH — ECONOMIC STABILITY: FOOD INSECURITY: WITHIN THE PAST 12 MONTHS, YOU WORRIED THAT YOUR FOOD WOULD RUN OUT BEFORE YOU GOT MONEY TO BUY MORE.: NEVER TRUE

## 2023-08-02 ASSESSMENT — ANXIETY QUESTIONNAIRES
6. BECOMING EASILY ANNOYED OR IRRITABLE: 0
1. FEELING NERVOUS, ANXIOUS, OR ON EDGE: 0
5. BEING SO RESTLESS THAT IT IS HARD TO SIT STILL: 0
7. FEELING AFRAID AS IF SOMETHING AWFUL MIGHT HAPPEN: 0
2. NOT BEING ABLE TO STOP OR CONTROL WORRYING: 0
GAD7 TOTAL SCORE: 0
3. WORRYING TOO MUCH ABOUT DIFFERENT THINGS: 0
4. TROUBLE RELAXING: 0
IF YOU CHECKED OFF ANY PROBLEMS ON THIS QUESTIONNAIRE, HOW DIFFICULT HAVE THESE PROBLEMS MADE IT FOR YOU TO DO YOUR WORK, TAKE CARE OF THINGS AT HOME, OR GET ALONG WITH OTHER PEOPLE: NOT DIFFICULT AT ALL

## 2023-08-02 ASSESSMENT — ENCOUNTER SYMPTOMS
SHORTNESS OF BREATH: 0
BLOOD IN STOOL: 0

## 2023-08-02 NOTE — PROGRESS NOTES
Svetlana Meadows M.D. Internal Medicine  [facility]  [address] [facility], [address] [place] [address]  Phone: [phone] Fax: [phone]    Hypertension  This is a chronic problem. The current episode started more than 1 year ago. The problem has been waxing and waning since onset. The problem is controlled. Pertinent negatives include no chest pain or shortness of breath. There are no associated agents to hypertension. Risk factors for coronary artery disease include male gender and dyslipidemia. Past treatments include calcium channel blockers. The current treatment provides significant improvement. There are no compliance problems. Hypertensive end-organ damage includes CAD/MI. Flory Carrillo is a 81 y.o. White (non-) male. Current Outpatient Medications   Medication Sig Dispense Refill    rosuvastatin (CRESTOR) 20 MG tablet Take 1 tablet by mouth every evening 90 tablet 0    amLODIPine (NORVASC) 5 MG tablet TAKE 1 TABLET BY MOUTH EVERY DAY 90 tablet 0    Adalimumab (HUMIRA PEN) 80 MG/0.8ML PNKT Inject 80 mg into the skin every 14 days 6 each 1    hydroxychloroquine (PLAQUENIL) 200 MG tablet Take 2 pills once a day after food. 180 tablet 1    predniSONE (DELTASONE) 20 MG tablet Take 1 pill once a day after breakfast for 1 week then 1/2 a pill once a day after breakfast for 1 week and then stop. 11 tablet 0    ketoconazole (NIZORAL) 2 % shampoo APPLY TO FACE EVERY DAY      aspirin 81 MG EC tablet Take 1 tablet by mouth daily       No current facility-administered medications for this visit. Allergies   Allergen Reactions    Meloxicam Other (See Comments)     Cause BP elevation     Past Medical History:   Diagnosis Date    Adverse effect of anesthesia     shortness of breath when waking, happened once and no other episodes after surgery since.      Chronic obstructive pulmonary disease (HCC)     mild    ED (erectile dysfunction)     Full dentures     GERD (gastroesophageal reflux

## 2023-08-02 NOTE — PATIENT INSTRUCTIONS
Please arrive 20 minutes prior to your scheduled time to see the doctor to allow sufficient time for check-in and rooming. Please bring all your prescription bottles to each appointment. I recommend all standard healthcare maintenance and cancer screenings (Colon cancer screening if due, Lung cancer screening if due, Mammogram and Bone Density if female and appropriate, etc) and standard immunizations (Flu, Pneumonia, Covid-19, Tetanus boosters, etc) as appropriate and due to lower your risk for potentially preventable morbidity/mortality from these diseases. Check BP 1-2 times per week. Call our office if BP runs above 140/80. Recommend tobacco cessation if you use tobacco products. Learning About Lung Cancer Screening  What is screening for lung cancer? Lung cancer screening is a way to find some lung cancers early, before a person has any symptoms of the cancer. Lung cancer screening may help those who have the highest risk for lung cancer--people age 48 and older who are or were heavy smokers. For most people, who aren't at increased risk, screening for lung cancer probably isn't helpful. Screening won't prevent cancer. And it may not find all lung cancers. Lung cancer screening may lower the risk of dying from lung cancer in a small number of people. How is it done? Lung cancer screening is done with a low-dose CT (computed tomography) scan. A CT scan uses X-rays, or radiation, to make detailed pictures of your body. Experts recommend that screening be done in medical centers that focus on finding and treating lung cancer. Who is screening recommended for? Lung cancer screening is recommended for people age 48 and older who are or were heavy smokers. That means people with a smoking history of at least 20 pack years. A pack year is a way to measure how heavy a smoker you are or were.   To figure out your pack years, multiply how many packs a day on average (assuming 20

## 2023-08-24 ENCOUNTER — OFFICE VISIT (OUTPATIENT)
Dept: RHEUMATOLOGY | Age: 73
End: 2023-08-24
Payer: MEDICARE

## 2023-08-24 VITALS
HEIGHT: 70 IN | HEART RATE: 80 BPM | SYSTOLIC BLOOD PRESSURE: 126 MMHG | BODY MASS INDEX: 21.19 KG/M2 | DIASTOLIC BLOOD PRESSURE: 79 MMHG | WEIGHT: 148 LBS

## 2023-08-24 DIAGNOSIS — Z79.899 LONG-TERM USE OF HIGH-RISK MEDICATION: ICD-10-CM

## 2023-08-24 DIAGNOSIS — M05.79 SEROPOSITIVE RHEUMATOID ARTHRITIS OF MULTIPLE SITES (HCC): ICD-10-CM

## 2023-08-24 DIAGNOSIS — M05.79 SEROPOSITIVE RHEUMATOID ARTHRITIS OF MULTIPLE SITES (HCC): Primary | ICD-10-CM

## 2023-08-24 LAB
ALBUMIN SERPL-MCNC: 3.6 G/DL (ref 3.2–4.6)
ALBUMIN/GLOB SERPL: 0.8 (ref 0.4–1.6)
ALP SERPL-CCNC: 78 U/L (ref 50–136)
ALT SERPL-CCNC: 29 U/L (ref 12–65)
ANION GAP SERPL CALC-SCNC: 4 MMOL/L (ref 2–11)
AST SERPL-CCNC: 30 U/L (ref 15–37)
BASOPHILS # BLD: 0 K/UL (ref 0–0.2)
BASOPHILS NFR BLD: 1 % (ref 0–2)
BILIRUB SERPL-MCNC: 0.7 MG/DL (ref 0.2–1.1)
BUN SERPL-MCNC: 11 MG/DL (ref 8–23)
CALCIUM SERPL-MCNC: 8.7 MG/DL (ref 8.3–10.4)
CHLORIDE SERPL-SCNC: 113 MMOL/L (ref 101–110)
CO2 SERPL-SCNC: 28 MMOL/L (ref 21–32)
CREAT SERPL-MCNC: 0.7 MG/DL (ref 0.8–1.5)
CRP SERPL-MCNC: 1.2 MG/DL (ref 0–0.9)
DIFFERENTIAL METHOD BLD: ABNORMAL
EOSINOPHIL # BLD: 0.2 K/UL (ref 0–0.8)
EOSINOPHIL NFR BLD: 4 % (ref 0.5–7.8)
ERYTHROCYTE [DISTWIDTH] IN BLOOD BY AUTOMATED COUNT: 12.6 % (ref 11.9–14.6)
GLOBULIN SER CALC-MCNC: 4.3 G/DL (ref 2.8–4.5)
GLUCOSE SERPL-MCNC: 86 MG/DL (ref 65–100)
HCT VFR BLD AUTO: 43.6 % (ref 41.1–50.3)
HGB BLD-MCNC: 14.8 G/DL (ref 13.6–17.2)
IMM GRANULOCYTES # BLD AUTO: 0 K/UL (ref 0–0.5)
IMM GRANULOCYTES NFR BLD AUTO: 0 % (ref 0–5)
LYMPHOCYTES # BLD: 1.5 K/UL (ref 0.5–4.6)
LYMPHOCYTES NFR BLD: 28 % (ref 13–44)
MCH RBC QN AUTO: 33.9 PG (ref 26.1–32.9)
MCHC RBC AUTO-ENTMCNC: 33.9 G/DL (ref 31.4–35)
MCV RBC AUTO: 99.8 FL (ref 82–102)
MONOCYTES # BLD: 0.7 K/UL (ref 0.1–1.3)
MONOCYTES NFR BLD: 14 % (ref 4–12)
NEUTS SEG # BLD: 2.9 K/UL (ref 1.7–8.2)
NEUTS SEG NFR BLD: 53 % (ref 43–78)
NRBC # BLD: 0 K/UL (ref 0–0.2)
PLATELET # BLD AUTO: 198 K/UL (ref 150–450)
PMV BLD AUTO: 10.4 FL (ref 9.4–12.3)
POTASSIUM SERPL-SCNC: 3.8 MMOL/L (ref 3.5–5.1)
PROT SERPL-MCNC: 7.9 G/DL (ref 6.3–8.2)
RBC # BLD AUTO: 4.37 M/UL (ref 4.23–5.6)
SODIUM SERPL-SCNC: 145 MMOL/L (ref 133–143)
WBC # BLD AUTO: 5.4 K/UL (ref 4.3–11.1)

## 2023-08-24 PROCEDURE — 99214 OFFICE O/P EST MOD 30 MIN: CPT | Performed by: INTERNAL MEDICINE

## 2023-08-24 PROCEDURE — 3078F DIAST BP <80 MM HG: CPT | Performed by: INTERNAL MEDICINE

## 2023-08-24 PROCEDURE — 3017F COLORECTAL CA SCREEN DOC REV: CPT | Performed by: INTERNAL MEDICINE

## 2023-08-24 PROCEDURE — 4004F PT TOBACCO SCREEN RCVD TLK: CPT | Performed by: INTERNAL MEDICINE

## 2023-08-24 PROCEDURE — G8420 CALC BMI NORM PARAMETERS: HCPCS | Performed by: INTERNAL MEDICINE

## 2023-08-24 PROCEDURE — G8427 DOCREV CUR MEDS BY ELIG CLIN: HCPCS | Performed by: INTERNAL MEDICINE

## 2023-08-24 PROCEDURE — 1123F ACP DISCUSS/DSCN MKR DOCD: CPT | Performed by: INTERNAL MEDICINE

## 2023-08-24 PROCEDURE — 3074F SYST BP LT 130 MM HG: CPT | Performed by: INTERNAL MEDICINE

## 2023-08-24 RX ORDER — ADALIMUMAB 80MG/0.8ML
80 KIT SUBCUTANEOUS
Qty: 6 EACH | Refills: 1 | Status: SHIPPED | OUTPATIENT
Start: 2023-08-24

## 2023-08-24 RX ORDER — HYDROXYCHLOROQUINE SULFATE 200 MG/1
TABLET, FILM COATED ORAL
Qty: 135 TABLET | Refills: 1 | Status: SHIPPED | OUTPATIENT
Start: 2023-08-24

## 2023-08-24 ASSESSMENT — JOINT PAIN
TOTAL NUMBER OF SWOLLEN JOINTS: 0
TOTAL NUMBER OF TENDER JOINTS: 1

## 2023-08-24 ASSESSMENT — ROUTINE ASSESSMENT OF PATIENT INDEX DATA (RAPID3)
ON A SCALE OF ONE TO TEN, HOW DIFFICULT WAS IT FOR YOU TO COMPLETE THE LISTED DAILY PHYSICAL TASKS OVER THE LAST WEEK: 0.1
WHEN YOU AWAKENED IN THE MORNING OVER THE LAST WEEK, PLEASE INDICATE THE AMOUNT OF TIME IT TAKES UNTIL YOU ARE AS LIMBER AS YOU WILL BE FOR THE DAY: 1 HOUR
ON A SCALE OF ONE TO TEN, HOW MUCH PAIN HAVE YOU HAD BECAUSE OF YOUR CONDITION OVER THE PAST WEEK?: 2
ON A SCALE OF ONE TO TEN, CONSIDERING ALL THE WAYS IN WHICH ILLNESS AND HEALTH CONDITIONS MAY AFFECT YOU AT THIS TIME, PLEASE INDICATE BELOW HOW YOU ARE DOING:: 4
ON A SCALE OF ONE TO TEN, HOW MUCH OF A PROBLEM HAS UNUSUAL FATIGUE OR TIREDNESS BEEN FOR YOU OVER THE PAST WEEK?: 2

## 2023-08-24 NOTE — PROGRESS NOTES
care of this patient. Follow-up and Dispositions    Return in about 4 months (around 12/24/2023). Electronically signed by:  Saranya Obando MD      This note was dictated using dragon voice recognition software.   It has been proofread, but there may still exist voice recognition errors that the author did not detect.                --------------------------------------------------------------------------------------------------------------------------------------------------------------------------------------------------------------------------------

## 2023-09-07 DIAGNOSIS — I10 ESSENTIAL HYPERTENSION: ICD-10-CM

## 2023-09-07 DIAGNOSIS — I25.84 CORONARY ARTERY DISEASE DUE TO CALCIFIED CORONARY LESION: ICD-10-CM

## 2023-09-07 DIAGNOSIS — I25.10 CORONARY ARTERY DISEASE DUE TO CALCIFIED CORONARY LESION: ICD-10-CM

## 2023-09-07 DIAGNOSIS — E78.2 MIXED HYPERLIPIDEMIA: ICD-10-CM

## 2023-09-07 RX ORDER — AMLODIPINE BESYLATE 5 MG/1
TABLET ORAL
Qty: 90 TABLET | Refills: 0 | OUTPATIENT
Start: 2023-09-07

## 2023-09-07 RX ORDER — ROSUVASTATIN CALCIUM 20 MG/1
TABLET, COATED ORAL
Qty: 90 TABLET | Refills: 0 | OUTPATIENT
Start: 2023-09-07

## 2023-09-25 ENCOUNTER — HOSPITAL ENCOUNTER (OUTPATIENT)
Dept: CT IMAGING | Age: 73
Discharge: HOME | End: 2023-09-28
Attending: INTERNAL MEDICINE
Payer: MEDICARE

## 2023-09-25 DIAGNOSIS — R91.8 LUNG NODULES: Primary | ICD-10-CM

## 2023-09-25 DIAGNOSIS — Z87.891 PERSONAL HISTORY OF TOBACCO USE: ICD-10-CM

## 2023-09-25 PROCEDURE — 71271 CT THORAX LUNG CANCER SCR C-: CPT

## 2023-09-26 DIAGNOSIS — I10 ESSENTIAL HYPERTENSION: ICD-10-CM

## 2023-09-26 RX ORDER — AMLODIPINE BESYLATE 5 MG/1
5 TABLET ORAL DAILY
Qty: 90 TABLET | Refills: 0 | Status: SHIPPED | OUTPATIENT
Start: 2023-09-26

## 2023-11-02 DIAGNOSIS — I25.10 CORONARY ARTERY DISEASE DUE TO CALCIFIED CORONARY LESION: ICD-10-CM

## 2023-11-02 DIAGNOSIS — E78.2 MIXED HYPERLIPIDEMIA: ICD-10-CM

## 2023-11-02 DIAGNOSIS — I25.84 CORONARY ARTERY DISEASE DUE TO CALCIFIED CORONARY LESION: ICD-10-CM

## 2023-11-02 RX ORDER — ROSUVASTATIN CALCIUM 20 MG/1
20 TABLET, COATED ORAL EVERY EVENING
Qty: 90 TABLET | OUTPATIENT
Start: 2023-11-02

## 2023-11-02 RX ORDER — ROSUVASTATIN CALCIUM 20 MG/1
20 TABLET, COATED ORAL EVERY EVENING
Qty: 30 TABLET | Refills: 0 | Status: SHIPPED | OUTPATIENT
Start: 2023-11-02

## 2023-11-15 DIAGNOSIS — I25.10 CORONARY ARTERY DISEASE DUE TO CALCIFIED CORONARY LESION: ICD-10-CM

## 2023-11-15 DIAGNOSIS — I25.84 CORONARY ARTERY DISEASE DUE TO CALCIFIED CORONARY LESION: ICD-10-CM

## 2023-11-15 DIAGNOSIS — E78.2 MIXED HYPERLIPIDEMIA: ICD-10-CM

## 2023-11-15 RX ORDER — ROSUVASTATIN CALCIUM 20 MG/1
20 TABLET, COATED ORAL EVERY EVENING
Qty: 30 TABLET | Refills: 0 | OUTPATIENT
Start: 2023-11-15

## 2023-12-04 ENCOUNTER — OFFICE VISIT (OUTPATIENT)
Dept: INTERNAL MEDICINE CLINIC | Facility: CLINIC | Age: 73
End: 2023-12-04
Payer: MEDICARE

## 2023-12-04 VITALS
WEIGHT: 148 LBS | OXYGEN SATURATION: 98 % | HEART RATE: 79 BPM | HEIGHT: 70 IN | BODY MASS INDEX: 21.19 KG/M2 | DIASTOLIC BLOOD PRESSURE: 75 MMHG | TEMPERATURE: 98.1 F | SYSTOLIC BLOOD PRESSURE: 117 MMHG

## 2023-12-04 DIAGNOSIS — E78.2 MIXED HYPERLIPIDEMIA: ICD-10-CM

## 2023-12-04 DIAGNOSIS — I25.10 CORONARY ARTERY DISEASE DUE TO CALCIFIED CORONARY LESION: ICD-10-CM

## 2023-12-04 DIAGNOSIS — I25.84 CORONARY ARTERY DISEASE DUE TO CALCIFIED CORONARY LESION: ICD-10-CM

## 2023-12-04 DIAGNOSIS — I10 ESSENTIAL HYPERTENSION: ICD-10-CM

## 2023-12-04 DIAGNOSIS — Z00.00 MEDICARE ANNUAL WELLNESS VISIT, SUBSEQUENT: Primary | ICD-10-CM

## 2023-12-04 DIAGNOSIS — M05.79 RHEUMATOID ARTHRITIS INVOLVING MULTIPLE SITES WITH POSITIVE RHEUMATOID FACTOR (HCC): ICD-10-CM

## 2023-12-04 LAB
ALBUMIN SERPL-MCNC: 3.9 G/DL (ref 3.2–4.6)
ALBUMIN/GLOB SERPL: 1 (ref 0.4–1.6)
ALP SERPL-CCNC: 78 U/L (ref 50–136)
ALT SERPL-CCNC: 21 U/L (ref 12–65)
ANION GAP SERPL CALC-SCNC: 5 MMOL/L (ref 2–11)
APPEARANCE UR: CLEAR
AST SERPL-CCNC: 22 U/L (ref 15–37)
BASOPHILS # BLD: 0.1 K/UL (ref 0–0.2)
BASOPHILS NFR BLD: 1 % (ref 0–2)
BILIRUB DIRECT SERPL-MCNC: 0.2 MG/DL
BILIRUB SERPL-MCNC: 0.5 MG/DL (ref 0.2–1.1)
BILIRUB UR QL: NEGATIVE
BUN SERPL-MCNC: 10 MG/DL (ref 8–23)
CALCIUM SERPL-MCNC: 9.1 MG/DL (ref 8.3–10.4)
CHLORIDE SERPL-SCNC: 111 MMOL/L (ref 101–110)
CHOLEST SERPL-MCNC: 145 MG/DL
CO2 SERPL-SCNC: 26 MMOL/L (ref 21–32)
COLOR UR: NORMAL
CREAT SERPL-MCNC: 0.7 MG/DL (ref 0.8–1.5)
DIFFERENTIAL METHOD BLD: ABNORMAL
EOSINOPHIL # BLD: 0.3 K/UL (ref 0–0.8)
EOSINOPHIL NFR BLD: 4 % (ref 0.5–7.8)
ERYTHROCYTE [DISTWIDTH] IN BLOOD BY AUTOMATED COUNT: 12.9 % (ref 11.9–14.6)
GLOBULIN SER CALC-MCNC: 4.1 G/DL (ref 2.8–4.5)
GLUCOSE SERPL-MCNC: 88 MG/DL (ref 65–100)
GLUCOSE UR STRIP.AUTO-MCNC: NEGATIVE MG/DL
HCT VFR BLD AUTO: 45 % (ref 41.1–50.3)
HDLC SERPL-MCNC: 90 MG/DL (ref 40–60)
HDLC SERPL: 1.6
HGB BLD-MCNC: 15.2 G/DL (ref 13.6–17.2)
HGB UR QL STRIP: NEGATIVE
IMM GRANULOCYTES # BLD AUTO: 0 K/UL (ref 0–0.5)
IMM GRANULOCYTES NFR BLD AUTO: 0 % (ref 0–5)
KETONES UR QL STRIP.AUTO: NEGATIVE MG/DL
LDLC SERPL CALC-MCNC: 43.2 MG/DL
LEUKOCYTE ESTERASE UR QL STRIP.AUTO: NEGATIVE
LYMPHOCYTES # BLD: 1.8 K/UL (ref 0.5–4.6)
LYMPHOCYTES NFR BLD: 25 % (ref 13–44)
MCH RBC QN AUTO: 34.8 PG (ref 26.1–32.9)
MCHC RBC AUTO-ENTMCNC: 33.8 G/DL (ref 31.4–35)
MCV RBC AUTO: 103 FL (ref 82–102)
MONOCYTES # BLD: 0.9 K/UL (ref 0.1–1.3)
MONOCYTES NFR BLD: 13 % (ref 4–12)
NEUTS SEG # BLD: 4.1 K/UL (ref 1.7–8.2)
NEUTS SEG NFR BLD: 57 % (ref 43–78)
NITRITE UR QL STRIP.AUTO: NEGATIVE
NRBC # BLD: 0 K/UL (ref 0–0.2)
PH UR STRIP: 6.5 (ref 5–9)
PLATELET # BLD AUTO: 201 K/UL (ref 150–450)
PMV BLD AUTO: 10.3 FL (ref 9.4–12.3)
POTASSIUM SERPL-SCNC: 4.3 MMOL/L (ref 3.5–5.1)
PROT SERPL-MCNC: 8 G/DL (ref 6.3–8.2)
PROT UR STRIP-MCNC: NEGATIVE MG/DL
RBC # BLD AUTO: 4.37 M/UL (ref 4.23–5.6)
SODIUM SERPL-SCNC: 142 MMOL/L (ref 133–143)
SP GR UR REFRACTOMETRY: 1.02 (ref 1–1.02)
TRIGL SERPL-MCNC: 59 MG/DL (ref 35–150)
TSH, 3RD GENERATION: 1.68 UIU/ML (ref 0.36–3.74)
UROBILINOGEN UR QL STRIP.AUTO: 0.2 EU/DL (ref 0.2–1)
VLDLC SERPL CALC-MCNC: 11.8 MG/DL (ref 6–23)
WBC # BLD AUTO: 7.2 K/UL (ref 4.3–11.1)

## 2023-12-04 PROCEDURE — 99214 OFFICE O/P EST MOD 30 MIN: CPT | Performed by: INTERNAL MEDICINE

## 2023-12-04 PROCEDURE — G0439 PPPS, SUBSEQ VISIT: HCPCS | Performed by: INTERNAL MEDICINE

## 2023-12-04 PROCEDURE — 3017F COLORECTAL CA SCREEN DOC REV: CPT | Performed by: INTERNAL MEDICINE

## 2023-12-04 PROCEDURE — G8427 DOCREV CUR MEDS BY ELIG CLIN: HCPCS | Performed by: INTERNAL MEDICINE

## 2023-12-04 PROCEDURE — G8420 CALC BMI NORM PARAMETERS: HCPCS | Performed by: INTERNAL MEDICINE

## 2023-12-04 PROCEDURE — 3074F SYST BP LT 130 MM HG: CPT | Performed by: INTERNAL MEDICINE

## 2023-12-04 PROCEDURE — 1123F ACP DISCUSS/DSCN MKR DOCD: CPT | Performed by: INTERNAL MEDICINE

## 2023-12-04 PROCEDURE — 4004F PT TOBACCO SCREEN RCVD TLK: CPT | Performed by: INTERNAL MEDICINE

## 2023-12-04 PROCEDURE — G8484 FLU IMMUNIZE NO ADMIN: HCPCS | Performed by: INTERNAL MEDICINE

## 2023-12-04 PROCEDURE — 3078F DIAST BP <80 MM HG: CPT | Performed by: INTERNAL MEDICINE

## 2023-12-04 RX ORDER — AMLODIPINE BESYLATE 5 MG/1
5 TABLET ORAL DAILY
Qty: 90 TABLET | Refills: 1 | Status: SHIPPED | OUTPATIENT
Start: 2023-12-04

## 2023-12-04 RX ORDER — ROSUVASTATIN CALCIUM 20 MG/1
20 TABLET, COATED ORAL EVERY EVENING
Qty: 90 TABLET | Refills: 1 | Status: SHIPPED | OUTPATIENT
Start: 2023-12-04

## 2023-12-04 SDOH — ECONOMIC STABILITY: FOOD INSECURITY: WITHIN THE PAST 12 MONTHS, THE FOOD YOU BOUGHT JUST DIDN'T LAST AND YOU DIDN'T HAVE MONEY TO GET MORE.: NEVER TRUE

## 2023-12-04 SDOH — ECONOMIC STABILITY: FOOD INSECURITY: WITHIN THE PAST 12 MONTHS, YOU WORRIED THAT YOUR FOOD WOULD RUN OUT BEFORE YOU GOT MONEY TO BUY MORE.: NEVER TRUE

## 2023-12-04 SDOH — ECONOMIC STABILITY: INCOME INSECURITY: HOW HARD IS IT FOR YOU TO PAY FOR THE VERY BASICS LIKE FOOD, HOUSING, MEDICAL CARE, AND HEATING?: NOT HARD AT ALL

## 2023-12-04 ASSESSMENT — LIFESTYLE VARIABLES
HOW OFTEN DURING THE LAST YEAR HAVE YOU FAILED TO DO WHAT WAS NORMALLY EXPECTED FROM YOU BECAUSE OF DRINKING: 0
HOW OFTEN DO YOU HAVE A DRINK CONTAINING ALCOHOL: 2-3 TIMES A WEEK
HOW OFTEN DURING THE LAST YEAR HAVE YOU FOUND THAT YOU WERE NOT ABLE TO STOP DRINKING ONCE YOU HAD STARTED: 0
HAS A RELATIVE, FRIEND, DOCTOR, OR ANOTHER HEALTH PROFESSIONAL EXPRESSED CONCERN ABOUT YOUR DRINKING OR SUGGESTED YOU CUT DOWN: 0
HOW OFTEN DURING THE LAST YEAR HAVE YOU BEEN UNABLE TO REMEMBER WHAT HAPPENED THE NIGHT BEFORE BECAUSE YOU HAD BEEN DRINKING: 0
HOW OFTEN DURING THE LAST YEAR HAVE YOU HAD A FEELING OF GUILT OR REMORSE AFTER DRINKING: 0
HOW MANY STANDARD DRINKS CONTAINING ALCOHOL DO YOU HAVE ON A TYPICAL DAY: 3 OR 4
HAVE YOU OR SOMEONE ELSE BEEN INJURED AS A RESULT OF YOUR DRINKING: 0
HOW OFTEN DURING THE LAST YEAR HAVE YOU NEEDED AN ALCOHOLIC DRINK FIRST THING IN THE MORNING TO GET YOURSELF GOING AFTER A NIGHT OF HEAVY DRINKING: 0

## 2023-12-04 ASSESSMENT — ANXIETY QUESTIONNAIRES
5. BEING SO RESTLESS THAT IT IS HARD TO SIT STILL: 0
7. FEELING AFRAID AS IF SOMETHING AWFUL MIGHT HAPPEN: 0
6. BECOMING EASILY ANNOYED OR IRRITABLE: 0
4. TROUBLE RELAXING: 0
IF YOU CHECKED OFF ANY PROBLEMS ON THIS QUESTIONNAIRE, HOW DIFFICULT HAVE THESE PROBLEMS MADE IT FOR YOU TO DO YOUR WORK, TAKE CARE OF THINGS AT HOME, OR GET ALONG WITH OTHER PEOPLE: NOT DIFFICULT AT ALL
3. WORRYING TOO MUCH ABOUT DIFFERENT THINGS: 0
GAD7 TOTAL SCORE: 0
2. NOT BEING ABLE TO STOP OR CONTROL WORRYING: 0
1. FEELING NERVOUS, ANXIOUS, OR ON EDGE: 0

## 2023-12-04 ASSESSMENT — PATIENT HEALTH QUESTIONNAIRE - PHQ9
SUM OF ALL RESPONSES TO PHQ QUESTIONS 1-9: 0
SUM OF ALL RESPONSES TO PHQ9 QUESTIONS 1 & 2: 0
SUM OF ALL RESPONSES TO PHQ QUESTIONS 1-9: 0
1. LITTLE INTEREST OR PLEASURE IN DOING THINGS: 0
2. FEELING DOWN, DEPRESSED OR HOPELESS: 0

## 2023-12-04 ASSESSMENT — ENCOUNTER SYMPTOMS
SHORTNESS OF BREATH: 0
BLOOD IN STOOL: 0

## 2023-12-04 NOTE — PROGRESS NOTES
"  Patient called [facility]. Patient denies any complaints related to anticoagulation therapy at this time. Patient reports no change in medication, diet or health. Reinforced with patient to call clinic with any medication changes as this can impact INR. Reinforced signs and symptoms bleeding/clotting with patient. Patient aware to seek medical care if signs and symptoms develop. Advised that if patient falls and/or hits their head, they should seek medical attention. Verbalizes understanding. Dosing instructions given to patient verbally over the phone. Advised to call the clinic with any questions or concerns. Patient verbalizes understanding. Has clinic number. Anticoagulation Summary  As of 6/14/2019    INR goal:   2.5-3.5   TTR:   55.3 % (3.6 y)   INR used for dosing:   3.1 (6/14/2019)   Warfarin maintenance plan:   5 mg (2.5 mg x 2) every M, W, F; 2.5 mg (2.5 mg x 1) all other days   Weekly warfarin total:   25 mg   No change documented:   Jimmy Anderson RN   Plan last modified:   Sourav Davidson RN (6/5/2019)   Next INR check:   6/28/2019   Priority:   Follow-Up - 4 Weeks   Target end date: Indefinite    Indications    Long term (current) use of anticoagulants [Z79.01]  S/P AVR (aortic valve replacement) [Z95.2]             Anticoagulation Episode Summary     INR check location:       Preferred lab:       Send INR reminders to:   ABIOLA (OPEN ENROLLMENT) ACS CARD/EP    Comments:   Next STAC due 2/11/20; uses meter at work lot# [id number]; 2.5 mg tablets;  ""Pat\""; call [facility] and leave message; Limitations: Anemia No Limitations Last H/H 11/5/18 [facility]      Anticoagulation Care Providers     Provider Role Specialty Phone number    Leonid Khan MD Referring Cardiovascular Disease [phone]          Supervising provider: AYDEE Traore    " Taylor Ojeda M.D. Internal Medicine  [facility]  [address] [place] [address], [address] [place] [address]  Phone: [phone] Fax: [phone]    Hypertension  This is a chronic problem. The current episode started more than 1 year ago. The problem has been waxing and waning since onset. The problem is controlled. Pertinent negatives include no chest pain or shortness of breath. There are no associated agents to hypertension. Risk factors for coronary artery disease include male gender, dyslipidemia and smoking/tobacco exposure. Past treatments include calcium channel blockers. The current treatment provides significant improvement. There are no compliance problems. Hypertensive end-organ damage includes CAD/MI. Trista Burgos is a 72 y.o. White (non-) male. Current Outpatient Medications   Medication Sig Dispense Refill    amLODIPine (NORVASC) 5 MG tablet Take 1 tablet by mouth daily 90 tablet 1    rosuvastatin (CRESTOR) 20 MG tablet Take 1 tablet by mouth every evening 90 tablet 1    hydroxychloroquine (PLAQUENIL) 200 MG tablet Take 2 pills once a day after food alternating with 1 pill once a day after food. 135 tablet 1    adalimumab (HUMIRA PEN) 80 MG/0.8ML PNKT Inject 80 mg into the skin every 14 days 6 each 1    sildenafil (VIAGRA) 100 MG tablet Take 1 tablet by mouth daily as needed for Erectile Dysfunction 10 tablet 5    aspirin 81 MG EC tablet Take 1 tablet by mouth daily       No current facility-administered medications for this visit. Allergies   Allergen Reactions    Meloxicam Other (See Comments)     Cause BP elevation     Past Medical History:   Diagnosis Date    Adverse effect of anesthesia     shortness of breath when waking, happened once and no other episodes after surgery since.      Chronic obstructive pulmonary disease (HCC)     mild    ED (erectile dysfunction)     Full dentures     GERD (gastroesophageal reflux disease)     seldom    History of skin cancer

## 2023-12-12 ENCOUNTER — HOSPITAL ENCOUNTER (OUTPATIENT)
Dept: CT IMAGING | Age: 73
Discharge: HOME | End: 2023-12-15
Attending: INTERNAL MEDICINE
Payer: MEDICARE

## 2023-12-12 DIAGNOSIS — R91.8 LUNG NODULES: ICD-10-CM

## 2023-12-12 PROCEDURE — 71250 CT THORAX DX C-: CPT

## 2023-12-21 DIAGNOSIS — M05.79 SEROPOSITIVE RHEUMATOID ARTHRITIS OF MULTIPLE SITES (HCC): ICD-10-CM

## 2023-12-21 LAB — CRP SERPL-MCNC: 0.6 MG/DL (ref 0–0.9)

## 2024-02-13 ENCOUNTER — TELEPHONE (OUTPATIENT)
Dept: RHEUMATOLOGY | Age: 74
End: 2024-02-13

## 2024-02-13 NOTE — TELEPHONE ENCOUNTER
PA for Humira 80 mg/0.8 ml approved by [employer].   Approved on February 12  CaseId:[id number];Status:Approved;Review Type:Prior Auth;Coverage Start Date:01/13/2024;Coverage End Date:12/31/2024;    PHONE CALL to patient to let him know it has been approved. LEFT MESSAGE on his VM to call with any issues getting the rx filled.

## 2024-02-13 NOTE — TELEPHONE ENCOUNTER
----- Message from Tara Wise sent at 2/12/2024  2:00 PM EST -----  Pt stop by office to bring a copy of a letter sent from [employer]. I will put it in your box, advise please.

## 2024-02-14 ENCOUNTER — OFFICE VISIT (OUTPATIENT)
Dept: INTERNAL MEDICINE CLINIC | Facility: CLINIC | Age: 74
End: 2024-02-14
Payer: MEDICARE

## 2024-02-14 VITALS
DIASTOLIC BLOOD PRESSURE: 73 MMHG | WEIGHT: 155 LBS | HEIGHT: 70 IN | BODY MASS INDEX: 22.19 KG/M2 | SYSTOLIC BLOOD PRESSURE: 115 MMHG | HEART RATE: 79 BPM | OXYGEN SATURATION: 100 % | RESPIRATION RATE: 18 BRPM | TEMPERATURE: 97 F

## 2024-02-14 DIAGNOSIS — M25.562 CHRONIC PAIN OF BOTH KNEES: Primary | ICD-10-CM

## 2024-02-14 DIAGNOSIS — M05.79 RHEUMATOID ARTHRITIS INVOLVING MULTIPLE SITES WITH POSITIVE RHEUMATOID FACTOR (HCC): ICD-10-CM

## 2024-02-14 DIAGNOSIS — M25.561 CHRONIC PAIN OF BOTH KNEES: Primary | ICD-10-CM

## 2024-02-14 DIAGNOSIS — G89.29 CHRONIC PAIN OF BOTH KNEES: Primary | ICD-10-CM

## 2024-02-14 PROCEDURE — 3017F COLORECTAL CA SCREEN DOC REV: CPT | Performed by: INTERNAL MEDICINE

## 2024-02-14 PROCEDURE — 3078F DIAST BP <80 MM HG: CPT | Performed by: INTERNAL MEDICINE

## 2024-02-14 PROCEDURE — G8484 FLU IMMUNIZE NO ADMIN: HCPCS | Performed by: INTERNAL MEDICINE

## 2024-02-14 PROCEDURE — G8420 CALC BMI NORM PARAMETERS: HCPCS | Performed by: INTERNAL MEDICINE

## 2024-02-14 PROCEDURE — G8428 CUR MEDS NOT DOCUMENT: HCPCS | Performed by: INTERNAL MEDICINE

## 2024-02-14 PROCEDURE — 4004F PT TOBACCO SCREEN RCVD TLK: CPT | Performed by: INTERNAL MEDICINE

## 2024-02-14 PROCEDURE — 3074F SYST BP LT 130 MM HG: CPT | Performed by: INTERNAL MEDICINE

## 2024-02-14 PROCEDURE — 99213 OFFICE O/P EST LOW 20 MIN: CPT | Performed by: INTERNAL MEDICINE

## 2024-02-14 PROCEDURE — 1123F ACP DISCUSS/DSCN MKR DOCD: CPT | Performed by: INTERNAL MEDICINE

## 2024-02-14 NOTE — PROGRESS NOTES
Gibson Forrester M.D.  Internal Medicine  [address] [address]  Phone: [phone]  Fax: [phone]    Knee Pain   Incident onset: No specific incident.  Has been noticing pain 2-3 months.  Becoming more frequent. Incident location: No specific injury. There was no injury mechanism. The pain is present in the left knee and right knee (L > R.). Quality: sharp. The pain is at a severity of 8/10. The pain is severe. The pain has been Intermittent since onset. Associated symptoms include an inability to bear weight. Pertinent negatives include no muscle weakness. Associated symptoms comments: No stiffness.   . The symptoms are aggravated by weight bearing. Treatments tried: Knee wrap.  On Tx for RA. The treatment provided mild relief.      Brennon Saleem is a 73 y.o. White (non-) male.     Current Outpatient Medications   Medication Sig Dispense Refill    adalimumab (HUMIRA PEN) 80 MG/0.8ML PNKT Inject 80 mg into the skin every 14 days 6 each 1    amLODIPine (NORVASC) 5 MG tablet Take 1 tablet by mouth daily 90 tablet 1    rosuvastatin (CRESTOR) 20 MG tablet Take 1 tablet by mouth every evening 90 tablet 1    hydroxychloroquine (PLAQUENIL) 200 MG tablet Take 2 pills once a day after food alternating with 1 pill once a day after food. 135 tablet 1    sildenafil (VIAGRA) 100 MG tablet Take 1 tablet by mouth daily as needed for Erectile Dysfunction 10 tablet 5    aspirin 81 MG EC tablet Take 1 tablet by mouth daily       No current facility-administered medications for this visit.     Allergies   Allergen Reactions    Meloxicam Other (See Comments)     Cause BP elevation     Past Medical History:   Diagnosis Date    Adverse effect of anesthesia     shortness of breath when waking, happened once and no other episodes after surgery since.     Chronic obstructive pulmonary disease (HCC)     mild    ED (erectile dysfunction)     Full dentures     GERD

## 2024-04-05 ENCOUNTER — OFFICE VISIT (OUTPATIENT)
Dept: INTERNAL MEDICINE CLINIC | Facility: CLINIC | Age: 74
End: 2024-04-05

## 2024-04-05 VITALS
WEIGHT: 147 LBS | TEMPERATURE: 97.9 F | HEART RATE: 74 BPM | DIASTOLIC BLOOD PRESSURE: 78 MMHG | OXYGEN SATURATION: 99 % | BODY MASS INDEX: 21.05 KG/M2 | HEIGHT: 70 IN | SYSTOLIC BLOOD PRESSURE: 113 MMHG

## 2024-04-05 DIAGNOSIS — E78.2 MIXED HYPERLIPIDEMIA: ICD-10-CM

## 2024-04-05 DIAGNOSIS — M05.79 RHEUMATOID ARTHRITIS INVOLVING MULTIPLE SITES WITH POSITIVE RHEUMATOID FACTOR (HCC): ICD-10-CM

## 2024-04-05 DIAGNOSIS — I25.10 CORONARY ARTERY DISEASE DUE TO CALCIFIED CORONARY LESION: Primary | ICD-10-CM

## 2024-04-05 DIAGNOSIS — N52.9 IMPOTENCE DUE TO ERECTILE DYSFUNCTION: ICD-10-CM

## 2024-04-05 DIAGNOSIS — R22.1 NECK MASS: ICD-10-CM

## 2024-04-05 DIAGNOSIS — I10 ESSENTIAL HYPERTENSION: ICD-10-CM

## 2024-04-05 DIAGNOSIS — I25.84 CORONARY ARTERY DISEASE DUE TO CALCIFIED CORONARY LESION: Primary | ICD-10-CM

## 2024-04-05 DIAGNOSIS — R13.19 ESOPHAGEAL DYSPHAGIA: ICD-10-CM

## 2024-04-05 PROBLEM — S01.80XA OPEN FOREHEAD WOUND, INITIAL ENCOUNTER: Status: RESOLVED | Noted: 2022-11-07 | Resolved: 2024-04-05

## 2024-04-05 RX ORDER — SILDENAFIL 100 MG/1
100 TABLET, FILM COATED ORAL DAILY PRN
Qty: 10 TABLET | Refills: 5 | Status: SHIPPED | OUTPATIENT
Start: 2024-04-05

## 2024-04-05 RX ORDER — AMLODIPINE BESYLATE 5 MG/1
5 TABLET ORAL DAILY
Qty: 90 TABLET | Refills: 1 | Status: SHIPPED | OUTPATIENT
Start: 2024-04-05

## 2024-04-05 RX ORDER — ROSUVASTATIN CALCIUM 20 MG/1
20 TABLET, COATED ORAL EVERY EVENING
Qty: 90 TABLET | Refills: 1 | Status: SHIPPED | OUTPATIENT
Start: 2024-04-05

## 2024-04-05 RX ORDER — KETOCONAZOLE 20 MG/ML
2 SHAMPOO TOPICAL DAILY PRN
COMMUNITY
Start: 2024-03-15

## 2024-04-05 RX ORDER — TRIAMCINOLONE ACETONIDE 1 MG/G
2 CREAM TOPICAL 2 TIMES DAILY
COMMUNITY
Start: 2024-01-17

## 2024-04-05 SDOH — ECONOMIC STABILITY: FOOD INSECURITY: WITHIN THE PAST 12 MONTHS, THE FOOD YOU BOUGHT JUST DIDN'T LAST AND YOU DIDN'T HAVE MONEY TO GET MORE.: NEVER TRUE

## 2024-04-05 SDOH — ECONOMIC STABILITY: FOOD INSECURITY: WITHIN THE PAST 12 MONTHS, YOU WORRIED THAT YOUR FOOD WOULD RUN OUT BEFORE YOU GOT MONEY TO BUY MORE.: NEVER TRUE

## 2024-04-05 SDOH — ECONOMIC STABILITY: INCOME INSECURITY: HOW HARD IS IT FOR YOU TO PAY FOR THE VERY BASICS LIKE FOOD, HOUSING, MEDICAL CARE, AND HEATING?: NOT VERY HARD

## 2024-04-05 ASSESSMENT — PATIENT HEALTH QUESTIONNAIRE - PHQ9
SUM OF ALL RESPONSES TO PHQ QUESTIONS 1-9: 0
SUM OF ALL RESPONSES TO PHQ9 QUESTIONS 1 & 2: 0
SUM OF ALL RESPONSES TO PHQ QUESTIONS 1-9: 0
2. FEELING DOWN, DEPRESSED OR HOPELESS: NOT AT ALL
1. LITTLE INTEREST OR PLEASURE IN DOING THINGS: NOT AT ALL

## 2024-04-05 ASSESSMENT — ENCOUNTER SYMPTOMS: BLOOD IN STOOL: 0

## 2024-04-05 ASSESSMENT — ANXIETY QUESTIONNAIRES
2. NOT BEING ABLE TO STOP OR CONTROL WORRYING: NOT AT ALL
3. WORRYING TOO MUCH ABOUT DIFFERENT THINGS: NOT AT ALL
IF YOU CHECKED OFF ANY PROBLEMS ON THIS QUESTIONNAIRE, HOW DIFFICULT HAVE THESE PROBLEMS MADE IT FOR YOU TO DO YOUR WORK, TAKE CARE OF THINGS AT HOME, OR GET ALONG WITH OTHER PEOPLE: NOT DIFFICULT AT ALL
GAD7 TOTAL SCORE: 0
4. TROUBLE RELAXING: NOT AT ALL
5. BEING SO RESTLESS THAT IT IS HARD TO SIT STILL: NOT AT ALL
7. FEELING AFRAID AS IF SOMETHING AWFUL MIGHT HAPPEN: NOT AT ALL
1. FEELING NERVOUS, ANXIOUS, OR ON EDGE: NOT AT ALL
6. BECOMING EASILY ANNOYED OR IRRITABLE: NOT AT ALL

## 2024-04-05 NOTE — PROGRESS NOTES
Gibson Forrester [place] [address]  Phone: [phone] Fax: [phone]    Hypertension  This is a chronic problem.     Brennon Saleem is a 73 y.o. White (non-) male.     Current Outpatient Medications   Medication Sig Dispense Refill    ketoconazole (NIZORAL) 2 % shampoo Apply 2 g topically daily as needed for Itching      triamcinolone (KENALOG) 0.1 % cream Apply 2 g topically 2 times daily      amLODIPine (NORVASC) 5 MG tablet Take 1 tablet by mouth daily 90 tablet 1    rosuvastatin (CRESTOR) 20 MG tablet Take 1 tablet by mouth every evening 90 tablet 1    sildenafil (VIAGRA) 100 MG tablet Take 1 tablet by mouth daily as needed for Erectile Dysfunction 10 tablet 5    adalimumab (HUMIRA PEN) 80 MG/0.8ML PNKT Inject 80 mg into the skin every 14 days 6 each 1    hydroxychloroquine (PLAQUENIL) 200 MG tablet Take 2 pills once a day after food alternating with 1 pill once a day after food. 135 tablet 1    aspirin 81 MG EC tablet Take 1 tablet by mouth daily       No current facility-administered medications for this visit.     Allergies   Allergen Reactions    Meloxicam Other (See Comments)     Cause BP elevation     Past Medical History:   Diagnosis Date    Adverse effect of anesthesia     shortness of breath when waking, happened once and no other episodes after surgery since.     Chronic obstructive pulmonary disease (HCC)     mild    ED (erectile dysfunction)     Full dentures     GERD (gastroesophageal reflux disease)     seldom    History of skin cancer     non- melanoma skin cancer- removed, left hand    Lung collapse 2012    related to pneumonia caused by Remicade    Open forehead wound, initial encounter 11/7/2022    RA (rheumatoid arthritis) (HCC)     hands, ankles and feet     Past Surgical History:   Procedure Laterality Date    CHEST SURGERY Left 2006    biopsy- lung nodule- benign per pt- yearly CT     CHEST SURGERY

## 2024-04-18 ENCOUNTER — HOSPITAL ENCOUNTER (OUTPATIENT)
Dept: CT IMAGING | Age: 74
Discharge: HOME | End: 2024-04-18
Attending: INTERNAL MEDICINE
Payer: MEDICARE

## 2024-04-18 ENCOUNTER — TELEPHONE (OUTPATIENT)
Dept: INTERNAL MEDICINE CLINIC | Facility: CLINIC | Age: 74
End: 2024-04-18

## 2024-04-18 DIAGNOSIS — R22.1 NECK MASS: Primary | ICD-10-CM

## 2024-04-18 DIAGNOSIS — R22.1 NECK MASS: ICD-10-CM

## 2024-04-18 LAB — CREAT BLD-MCNC: 0.55 MG/DL (ref 0.8–1.5)

## 2024-04-18 PROCEDURE — 6360000004 HC RX CONTRAST MEDICATION: Performed by: INTERNAL MEDICINE

## 2024-04-18 PROCEDURE — 70491 CT SOFT TISSUE NECK W/DYE: CPT

## 2024-04-18 PROCEDURE — 82565 ASSAY OF CREATININE: CPT

## 2024-04-18 RX ADMIN — IOPAMIDOL 80 ML: 755 INJECTION, SOLUTION INTRAVENOUS at 07:59

## 2024-04-18 NOTE — TELEPHONE ENCOUNTER
Pt returned call in regards to CT results. I did give the pt the results and informed him that he's needing to see an ENT but didn't see the referral. I will tell him that you will call him tomorrow with information. Ty

## 2024-04-22 ENCOUNTER — TELEPHONE (OUTPATIENT)
Dept: INTERNAL MEDICINE CLINIC | Facility: CLINIC | Age: 74
End: 2024-04-22

## 2024-04-22 DIAGNOSIS — R22.1 NECK MASS: Primary | ICD-10-CM

## 2024-04-22 NOTE — TELEPHONE ENCOUNTER
Spoke with patient advised referral would be sent to [facility] and they would contact him to set up appt. Pt expressed understanding.

## 2024-04-22 NOTE — TELEPHONE ENCOUNTER
----- Message from Gibson Forrester MD sent at 4/18/2024 12:49 PM EDT -----  CT did show a 2 cm neck mass.  Needs to see ENT for neck mass.

## 2024-04-25 ENCOUNTER — OFFICE VISIT (OUTPATIENT)
Dept: RHEUMATOLOGY | Age: 74
End: 2024-04-25
Payer: MEDICARE

## 2024-04-25 VITALS
WEIGHT: 146 LBS | HEART RATE: 75 BPM | SYSTOLIC BLOOD PRESSURE: 125 MMHG | BODY MASS INDEX: 20.9 KG/M2 | DIASTOLIC BLOOD PRESSURE: 83 MMHG | HEIGHT: 70 IN

## 2024-04-25 DIAGNOSIS — Z11.1 SCREENING EXAMINATION FOR PULMONARY TUBERCULOSIS: ICD-10-CM

## 2024-04-25 DIAGNOSIS — M05.79 SEROPOSITIVE RHEUMATOID ARTHRITIS OF MULTIPLE SITES (HCC): ICD-10-CM

## 2024-04-25 DIAGNOSIS — Z79.899 LONG-TERM USE OF HIGH-RISK MEDICATION: ICD-10-CM

## 2024-04-25 DIAGNOSIS — M05.79 SEROPOSITIVE RHEUMATOID ARTHRITIS OF MULTIPLE SITES (HCC): Primary | ICD-10-CM

## 2024-04-25 LAB
ALBUMIN SERPL-MCNC: 4.1 G/DL (ref 3.2–4.6)
ALBUMIN/GLOB SERPL: 1.1 (ref 1–1.9)
ALP SERPL-CCNC: 74 U/L (ref 40–129)
ALT SERPL-CCNC: 16 U/L (ref 12–65)
ANION GAP SERPL CALC-SCNC: 11 MMOL/L (ref 9–18)
AST SERPL-CCNC: 30 U/L (ref 15–37)
BASOPHILS # BLD: 0.1 K/UL (ref 0–0.2)
BASOPHILS NFR BLD: 1 % (ref 0–2)
BILIRUB SERPL-MCNC: 0.6 MG/DL (ref 0–1.2)
BUN SERPL-MCNC: 10 MG/DL (ref 8–23)
CALCIUM SERPL-MCNC: 9.4 MG/DL (ref 8.8–10.2)
CHLORIDE SERPL-SCNC: 105 MMOL/L (ref 98–107)
CO2 SERPL-SCNC: 27 MMOL/L (ref 20–28)
CREAT SERPL-MCNC: 0.75 MG/DL (ref 0.8–1.3)
DIFFERENTIAL METHOD BLD: ABNORMAL
EOSINOPHIL # BLD: 0.4 K/UL (ref 0–0.8)
EOSINOPHIL NFR BLD: 6 % (ref 0.5–7.8)
ERYTHROCYTE [DISTWIDTH] IN BLOOD BY AUTOMATED COUNT: 13.6 % (ref 11.9–14.6)
GLOBULIN SER CALC-MCNC: 3.8 G/DL (ref 2.3–3.5)
GLUCOSE SERPL-MCNC: 88 MG/DL (ref 70–99)
HCT VFR BLD AUTO: 46.8 % (ref 41.1–50.3)
HGB BLD-MCNC: 15.5 G/DL (ref 13.6–17.2)
IMM GRANULOCYTES # BLD AUTO: 0 K/UL (ref 0–0.5)
IMM GRANULOCYTES NFR BLD AUTO: 0 % (ref 0–5)
LYMPHOCYTES # BLD: 1.5 K/UL (ref 0.5–4.6)
LYMPHOCYTES NFR BLD: 19 % (ref 13–44)
MCH RBC QN AUTO: 34.1 PG (ref 26.1–32.9)
MCHC RBC AUTO-ENTMCNC: 33.1 G/DL (ref 31.4–35)
MCV RBC AUTO: 103.1 FL (ref 82–102)
MONOCYTES # BLD: 0.8 K/UL (ref 0.1–1.3)
MONOCYTES NFR BLD: 10 % (ref 4–12)
NEUTS SEG # BLD: 5 K/UL (ref 1.7–8.2)
NEUTS SEG NFR BLD: 64 % (ref 43–78)
NRBC # BLD: 0 K/UL (ref 0–0.2)
PLATELET # BLD AUTO: 222 K/UL (ref 150–450)
PMV BLD AUTO: 10 FL (ref 9.4–12.3)
POTASSIUM SERPL-SCNC: 4.8 MMOL/L (ref 3.5–5.1)
PROT SERPL-MCNC: 7.8 G/DL (ref 6.3–8.2)
RBC # BLD AUTO: 4.54 M/UL (ref 4.23–5.6)
SODIUM SERPL-SCNC: 143 MMOL/L (ref 136–145)
WBC # BLD AUTO: 7.9 K/UL (ref 4.3–11.1)

## 2024-04-25 PROCEDURE — 3017F COLORECTAL CA SCREEN DOC REV: CPT | Performed by: INTERNAL MEDICINE

## 2024-04-25 PROCEDURE — 1123F ACP DISCUSS/DSCN MKR DOCD: CPT | Performed by: INTERNAL MEDICINE

## 2024-04-25 PROCEDURE — 4004F PT TOBACCO SCREEN RCVD TLK: CPT | Performed by: INTERNAL MEDICINE

## 2024-04-25 PROCEDURE — 99214 OFFICE O/P EST MOD 30 MIN: CPT | Performed by: INTERNAL MEDICINE

## 2024-04-25 PROCEDURE — G8420 CALC BMI NORM PARAMETERS: HCPCS | Performed by: INTERNAL MEDICINE

## 2024-04-25 PROCEDURE — G8427 DOCREV CUR MEDS BY ELIG CLIN: HCPCS | Performed by: INTERNAL MEDICINE

## 2024-04-25 PROCEDURE — 3079F DIAST BP 80-89 MM HG: CPT | Performed by: INTERNAL MEDICINE

## 2024-04-25 PROCEDURE — 3074F SYST BP LT 130 MM HG: CPT | Performed by: INTERNAL MEDICINE

## 2024-04-25 RX ORDER — ADALIMUMAB 80MG/0.8ML
80 KIT SUBCUTANEOUS
Qty: 6 EACH | Refills: 1 | Status: ACTIVE | OUTPATIENT
Start: 2024-04-25

## 2024-04-25 RX ORDER — ACETAMINOPHEN 500 MG
500 TABLET ORAL DAILY
COMMUNITY

## 2024-04-25 RX ORDER — HYDROXYCHLOROQUINE SULFATE 200 MG/1
TABLET, FILM COATED ORAL
Qty: 135 TABLET | Refills: 1 | Status: SHIPPED | OUTPATIENT
Start: 2024-04-25

## 2024-04-25 RX ORDER — VITAMIN E 268 MG
400 CAPSULE ORAL DAILY
COMMUNITY

## 2024-04-25 ASSESSMENT — JOINT PAIN
TOTAL NUMBER OF TENDER JOINTS: 1
TOTAL NUMBER OF SWOLLEN JOINTS: 0

## 2024-04-25 ASSESSMENT — ROUTINE ASSESSMENT OF PATIENT INDEX DATA (RAPID3)
ON A SCALE OF ONE TO TEN, CONSIDERING ALL THE WAYS IN WHICH ILLNESS AND HEALTH CONDITIONS MAY AFFECT YOU AT THIS TIME, PLEASE INDICATE BELOW HOW YOU ARE DOING:: 4
WHEN YOU AWAKENED IN THE MORNING OVER THE LAST WEEK, PLEASE INDICATE THE AMOUNT OF TIME IT TAKES UNTIL YOU ARE AS LIMBER AS YOU WILL BE FOR THE DAY: 15 MIN
ON A SCALE OF ONE TO TEN, HOW MUCH OF A PROBLEM HAS UNUSUAL FATIGUE OR TIREDNESS BEEN FOR YOU OVER THE PAST WEEK?: 0
ON A SCALE OF ONE TO TEN, HOW MUCH PAIN HAVE YOU HAD BECAUSE OF YOUR CONDITION OVER THE PAST WEEK?: 4
ON A SCALE OF ONE TO TEN, HOW DIFFICULT WAS IT FOR YOU TO COMPLETE THE LISTED DAILY PHYSICAL TASKS OVER THE LAST WEEK: 0.4

## 2024-04-25 NOTE — PROGRESS NOTES
reduction techniques were used for this  study.  All CT scans performed at this facility use one or all of the following:  Automated exposure control, adjustment of the mA and/or kVp according to  patient's size, iterative reconstruction.    COMPARISON: No prior CT soft tissue neck available    FINDINGS:    Deep to right-sided skin marker on axial series 2 image 64 there is 2.1 cm  diameter sharply demarcated round homogeneous soft tissue mass along the deep  lower margin of the right parotid gland raising the differential diagnosis of  pleomorphic adenoma or Warthin's tumor. The lesion does not appear to be  lymphadenopathy at this time.  Carotid and jugular veins in this area are patent. There are atherosclerotic  calcifications more prominent on the left.    - CERVICAL NODES: No significant adenopathy.  - SALIVARY GLANDS: No masses.  - TONSILS/PHARYNX: Normal.  - THYROID: No significant mass.    - SINUSES: Mucous retention cyst floor right maxillary sinus. No sinus air-fluid  levels. Opacity anterior ethmoids bilaterally suggesting ethmoiditis.  - BONES: Flowing osteophytes thoracolumbar spine resulting in bony ankylosis. No  localized bony destruction.  - LUNG APICES: Clear.  - OTHER: No additional findings.  Impression: 2.1 cm diameter sharply demarcated round homogeneous soft tissue  mass along the lower margin of the right parotid gland in the area of the skin  marker suggesting neoplasm, perhaps pleomorphic adenoma or Warthin's tumor.  No significant adenopathy.         Lab Reports Reviewed (if available): Last 3 months    Hospital Outpatient Visit on 04/18/2024   Component Date Value Ref Range Status    POC Creatinine 04/18/2024 0.55 (L)  0.8 - 1.5 mg/dL Final    eGFR, POC 04/18/2024 >90  >60 ml/min/1.73m2 Final    Comment:    Pediatric calculator link: https://www.kidney.org/professionals/kdoqi/gfr_calculatorped     These results are not intended for use in patients <18 years of age.     eGFR results are

## 2024-04-26 LAB — ERYTHROCYTE [SEDIMENTATION RATE] IN BLOOD: 6 MM/HR

## 2024-05-01 ENCOUNTER — OFFICE VISIT (OUTPATIENT)
Dept: ENT CLINIC | Age: 74
End: 2024-05-01
Payer: MEDICARE

## 2024-05-01 VITALS
WEIGHT: 146 LBS | HEIGHT: 70 IN | DIASTOLIC BLOOD PRESSURE: 63 MMHG | BODY MASS INDEX: 20.9 KG/M2 | SYSTOLIC BLOOD PRESSURE: 110 MMHG

## 2024-05-01 DIAGNOSIS — K21.9 LARYNGOPHARYNGEAL REFLUX (LPR): ICD-10-CM

## 2024-05-01 DIAGNOSIS — K11.8 PAROTID MASS: Primary | ICD-10-CM

## 2024-05-01 PROCEDURE — 4004F PT TOBACCO SCREEN RCVD TLK: CPT | Performed by: STUDENT IN AN ORGANIZED HEALTH CARE EDUCATION/TRAINING PROGRAM

## 2024-05-01 PROCEDURE — 99204 OFFICE O/P NEW MOD 45 MIN: CPT | Performed by: STUDENT IN AN ORGANIZED HEALTH CARE EDUCATION/TRAINING PROGRAM

## 2024-05-01 PROCEDURE — 3074F SYST BP LT 130 MM HG: CPT | Performed by: STUDENT IN AN ORGANIZED HEALTH CARE EDUCATION/TRAINING PROGRAM

## 2024-05-01 PROCEDURE — 3017F COLORECTAL CA SCREEN DOC REV: CPT | Performed by: STUDENT IN AN ORGANIZED HEALTH CARE EDUCATION/TRAINING PROGRAM

## 2024-05-01 PROCEDURE — G8427 DOCREV CUR MEDS BY ELIG CLIN: HCPCS | Performed by: STUDENT IN AN ORGANIZED HEALTH CARE EDUCATION/TRAINING PROGRAM

## 2024-05-01 PROCEDURE — 1123F ACP DISCUSS/DSCN MKR DOCD: CPT | Performed by: STUDENT IN AN ORGANIZED HEALTH CARE EDUCATION/TRAINING PROGRAM

## 2024-05-01 PROCEDURE — G8420 CALC BMI NORM PARAMETERS: HCPCS | Performed by: STUDENT IN AN ORGANIZED HEALTH CARE EDUCATION/TRAINING PROGRAM

## 2024-05-01 PROCEDURE — 3078F DIAST BP <80 MM HG: CPT | Performed by: STUDENT IN AN ORGANIZED HEALTH CARE EDUCATION/TRAINING PROGRAM

## 2024-05-01 RX ORDER — FAMOTIDINE 40 MG/1
40 TABLET, FILM COATED ORAL EVERY EVENING
Qty: 90 TABLET | Refills: 5 | Status: SHIPPED | OUTPATIENT
Start: 2024-05-01

## 2024-05-01 ASSESSMENT — ENCOUNTER SYMPTOMS
EYE ITCHING: 0
EYE REDNESS: 0
VOMITING: 0
SHORTNESS OF BREATH: 0

## 2024-05-01 NOTE — PROGRESS NOTES
Negative for congestion, ear discharge and ear pain.    Eyes:  Negative for redness and itching.   Respiratory:  Negative for shortness of breath.    Cardiovascular:  Negative for chest pain.   Gastrointestinal:  Negative for vomiting.   Endocrine: Negative for cold intolerance and heat intolerance.   Allergic/Immunologic: Negative for environmental allergies.   Neurological:  Negative for light-headedness.   Psychiatric/Behavioral:  Negative for sleep disturbance.           PHYSICAL EXAM:    /63 (Site: Left Upper Arm, Position: Sitting)   Ht 1.778 m (5' 10\")   Wt 66.2 kg (146 lb)   BMI 20.95 kg/m²     General: NAD, well-appearing  Neuro: No gross neuro deficits. CN's II-XII intact. No facial weakness.  Eyes: EOMI. Pupils reactive. No periorbital edema/ecchymosis.   Skin: No facial erythema, rashes or concerning lesions.  Nose: No external deviations or saddling. Intranasally, septum is midline without perforations, nasal mucosa appears healthy with no erythema, mucopurulence, or polyps.  Mouth: Moist mucus membranes, normal tongue/palate mobility, no concerning mucosal lesions.   Oropharynx: clear with no erythema/exudate, no tonsillar hypertrophy.  Ears: Normal appearing auricles, no hematomas. EACs clear with no cerumen impaction, healthy canal skin, TM's intact with no perforations or retraction pockets. No middle ear effusions.  Neck: Soft, supple, no palpable lateral neck masses.  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  Resp/Lungs: No audible stridor or wheezing, CTAB  Heart: RRR  Extremities: No clubbing or cyanosis.    Lab Results   Component Value Date    WBC 7.9 04/25/2024    HGB 15.5 04/25/2024    HCT 46.8 04/25/2024    .1 (H) 04/25/2024     04/25/2024     Lab Results   Component Value Date/Time     04/25/2024 09:10 AM    K 4.8 04/25/2024 09:10 AM     04/25/2024 09:10 AM    CO2 27 04/25/2024 09:10 AM    BUN 10 04/25/2024 09:10 AM

## 2024-05-02 LAB
M TB IFN-G CD4+ T-CELLS BLD-ACNC: NORMAL IU/ML
M TBIFN-G CD4+ CD8+T-CELLS BLD-ACNC: NORMAL IU/ML
QUANTIFERON MITOGEN VALUE: NORMAL IU/ML
QUANTIFERON NIL VALUE: NORMAL IU/ML

## 2024-05-03 ENCOUNTER — PREP FOR PROCEDURE (OUTPATIENT)
Dept: ENT CLINIC | Age: 74
End: 2024-05-03

## 2024-05-03 ENCOUNTER — TELEPHONE (OUTPATIENT)
Dept: RHEUMATOLOGY | Age: 74
End: 2024-05-03

## 2024-05-03 DIAGNOSIS — D49.0 NEOPLASM OF UNSPECIFIED BEHAVIOR OF DIGESTIVE SYSTEM: ICD-10-CM

## 2024-05-03 NOTE — TELEPHONE ENCOUNTER
----- Message from Ginny Ernst MD sent at 5/2/2024  5:37 PM EDT -----  Please check with the lab what happened here with the QuantiFERON gold and do let me know. Thanks       I called [facility], spoke with Kentrell. She said that the specimen was ran several times and they were unable to get accurate results. Pt will need to have specimen drawn again.

## 2024-05-05 NOTE — TELEPHONE ENCOUNTER
I will put in a new order for the QuantiFERON gold to be done when he comes back for his follow-up visit to see me next. Thanks for calling the lab and checking with them.

## 2024-05-27 DIAGNOSIS — E78.2 MIXED HYPERLIPIDEMIA: ICD-10-CM

## 2024-05-27 DIAGNOSIS — I25.84 CORONARY ARTERY DISEASE DUE TO CALCIFIED CORONARY LESION: ICD-10-CM

## 2024-05-27 DIAGNOSIS — I25.10 CORONARY ARTERY DISEASE DUE TO CALCIFIED CORONARY LESION: ICD-10-CM

## 2024-05-28 RX ORDER — ROSUVASTATIN CALCIUM 20 MG/1
20 TABLET, COATED ORAL EVERY EVENING
Qty: 90 TABLET | Refills: 1 | Status: SHIPPED | OUTPATIENT
Start: 2024-05-28

## 2024-06-26 NOTE — PERIOP NOTE
Patient verified name and .  Order for consent IS found in EHR and matches case posting; patient verifies procedure.   Type 2 surgery, phone assessment complete.  Orders not received.  Labs per surgeon: none  Labs per anesthesia protocol: Hgb-pt coming in to have drawn prior to surgery.  Outpatient lab located at [facility], [address] [place], [place]. Hours of operation are Monday thru Friday 8 am to 3:45 pm, no fasting and no appointment required.    Patient answered medical/surgical history questions at their best of ability. All prior to admission medications documented in EPIC.    Patient instructed to continue taking all prescription medications up to the day of surgery but to take only the following medications the day of surgery according to anesthesia guidelines with a small sip of water: Amlodipine, Hydroxychloroquine  Also, patient is requested to take 2 Tylenol in the morning and then again before bed on the day before surgery. Regular or extra strength may be used.       Patient informed that all vitamins and supplements should be held 7 days prior to surgery and NSAIDS 5 days prior to surgery.     Patient instructed on the following:    > Arrive at [facility] \"A\" Entrance, time of arrival to be called the day before by 1700  > NPO after midnight, unless otherwise indicated, including gum, mints, and ice chips  > Responsible adult must drive patient to the hospital, stay during surgery, and patient will need supervision 24 hours after anesthesia  > Use non moisturizing soap in shower the night before surgery and on the morning of surgery  > All piercings must be removed prior to arrival.    > Leave all valuables (money and jewelry) at home but bring insurance card and ID on DOS.   > You may be required to pay a deductible or co-pay on the day of your procedure. You can pre-pay by calling [phone] if your surgery is at the [facility] or [phone] if your surgery is at the [facility]  campus.  > Do not wear make-up, nail polish, lotions, cologne, perfumes, powders, or oil on skin. Artificial nails are not permitted.

## 2024-06-28 ENCOUNTER — HOSPITAL ENCOUNTER (OUTPATIENT)
Dept: LAB | Age: 74
End: 2024-06-28
Payer: MEDICARE

## 2024-06-28 DIAGNOSIS — Z01.818 PRE-OP TESTING: ICD-10-CM

## 2024-06-28 LAB — HGB BLD-MCNC: 14.6 G/DL (ref 13.6–17.2)

## 2024-06-28 PROCEDURE — 36415 COLL VENOUS BLD VENIPUNCTURE: CPT

## 2024-06-28 PROCEDURE — 85018 HEMOGLOBIN: CPT

## 2024-06-30 NOTE — DISCHARGE INSTRUCTIONS
Incision Care        -You may shower, but do NOT submerge incision site under water.  Clean crusting from the incision with half hydrogen peroxide and half water.      -During surgery, sutures will be placed.  These will be removed at your post-operative appointment.    -Please apply an antibiotic ointment to this area three times daily for three days, then use Vaseline two times daily for two weeks.  After that you can use Mederma or other silicone-based scar-away cream, if desired.      -Please call the office if you notice and swelling, abnormal bleeding, discharge and/or high fevers.  (101 degrees or higher), (swelling below the incision only, is usually normal)    -Do not take Tylenol if using prescribed pain medicine, as it already has Tylenol in it.     MEDICATION INTERACTION:    During your procedure you potentially received a medication or medications which may reduce the effectiveness of oral contraceptives. Please consider other forms of contraception for 1 month following your procedure if you are currently using oral contraceptives as your primary form of birth control. In addition to this, we recommend continuing your oral contraceptive as prescribed, unless otherwise instructed by your physician, during this time.    After general anesthesia or intravenous sedation, for 24 hours or while taking prescription Narcotics:  Limit your activities  A responsible adult needs to be with you for the next 24 hours  Do not drive and operate hazardous machinery  Do not make important personal or business decisions  Do not drink alcoholic beverages  If you have not urinated within 8 hours after discharge, and you are experiencing discomfort from urinary retention, please go to the nearest ED.  If you have sleep apnea and have a CPAP machine, please use it for all naps and sleeping.  Please use caution when taking narcotics and any of your home medications that may cause drowsiness.  *  Please give a list of your  current medications to your Primary Care Provider.  *  Please update this list whenever your medications are discontinued, doses are      changed, or new medications (including over-the-counter products) are added.  *  Please carry medication information at all times in case of emergency situations.    These are general instructions for a healthy lifestyle:  No smoking/ No tobacco products/ Avoid exposure to second hand smoke  Surgeon General's Warning:  Quitting smoking now greatly reduces serious risk to your health.  Obesity, smoking, and sedentary lifestyle greatly increases your risk for illness  A healthy diet, regular physical exercise & weight monitoring are important for maintaining a healthy lifestyle    You may be retaining fluid if you have a history of heart failure or if you experience any of the following symptoms:  Weight gain of 3 pounds or more overnight or 5 pounds in a week, increased swelling in our hands or feet or shortness of breath while lying flat in bed.  Please call your doctor as soon as you notice any of these symptoms; do not wait until your next office visit.

## 2024-07-04 ENCOUNTER — ANESTHESIA EVENT (OUTPATIENT)
Dept: SURGERY | Age: 74
End: 2024-07-04
Payer: MEDICARE

## 2024-07-05 ENCOUNTER — ANESTHESIA (OUTPATIENT)
Dept: SURGERY | Age: 74
End: 2024-07-05
Payer: MEDICARE

## 2024-07-05 ENCOUNTER — HOSPITAL ENCOUNTER (OUTPATIENT)
Age: 74
Setting detail: OUTPATIENT SURGERY
Discharge: HOME | End: 2024-07-05
Attending: STUDENT IN AN ORGANIZED HEALTH CARE EDUCATION/TRAINING PROGRAM | Admitting: STUDENT IN AN ORGANIZED HEALTH CARE EDUCATION/TRAINING PROGRAM
Payer: MEDICARE

## 2024-07-05 VITALS
RESPIRATION RATE: 15 BRPM | BODY MASS INDEX: 20.36 KG/M2 | WEIGHT: 142.2 LBS | TEMPERATURE: 98.1 F | SYSTOLIC BLOOD PRESSURE: 120 MMHG | HEART RATE: 80 BPM | OXYGEN SATURATION: 96 % | HEIGHT: 70 IN | DIASTOLIC BLOOD PRESSURE: 75 MMHG

## 2024-07-05 DIAGNOSIS — G89.18 POST-OP PAIN: Primary | ICD-10-CM

## 2024-07-05 DIAGNOSIS — Z01.818 PRE-OP TESTING: Primary | ICD-10-CM

## 2024-07-05 DIAGNOSIS — D49.0 NEOPLASM OF UNSPECIFIED BEHAVIOR OF DIGESTIVE SYSTEM: ICD-10-CM

## 2024-07-05 PROBLEM — K11.8 MASS OF RIGHT PAROTID GLAND: Status: ACTIVE | Noted: 2024-07-05

## 2024-07-05 PROCEDURE — 3600000003 HC SURGERY LEVEL 3 BASE: Performed by: STUDENT IN AN ORGANIZED HEALTH CARE EDUCATION/TRAINING PROGRAM

## 2024-07-05 PROCEDURE — 3600000013 HC SURGERY LEVEL 3 ADDTL 15MIN: Performed by: STUDENT IN AN ORGANIZED HEALTH CARE EDUCATION/TRAINING PROGRAM

## 2024-07-05 PROCEDURE — 2580000003 HC RX 258: Performed by: ANESTHESIOLOGY

## 2024-07-05 PROCEDURE — 2720000010 HC SURG SUPPLY STERILE: Performed by: STUDENT IN AN ORGANIZED HEALTH CARE EDUCATION/TRAINING PROGRAM

## 2024-07-05 PROCEDURE — 7100000011 HC PHASE II RECOVERY - ADDTL 15 MIN: Performed by: STUDENT IN AN ORGANIZED HEALTH CARE EDUCATION/TRAINING PROGRAM

## 2024-07-05 PROCEDURE — 6360000002 HC RX W HCPCS: Performed by: STUDENT IN AN ORGANIZED HEALTH CARE EDUCATION/TRAINING PROGRAM

## 2024-07-05 PROCEDURE — 7100000001 HC PACU RECOVERY - ADDTL 15 MIN: Performed by: STUDENT IN AN ORGANIZED HEALTH CARE EDUCATION/TRAINING PROGRAM

## 2024-07-05 PROCEDURE — 42415 EXCISE PAROTID GLAND/LESION: CPT | Performed by: STUDENT IN AN ORGANIZED HEALTH CARE EDUCATION/TRAINING PROGRAM

## 2024-07-05 PROCEDURE — 3700000000 HC ANESTHESIA ATTENDED CARE: Performed by: STUDENT IN AN ORGANIZED HEALTH CARE EDUCATION/TRAINING PROGRAM

## 2024-07-05 PROCEDURE — 2500000003 HC RX 250 WO HCPCS: Performed by: STUDENT IN AN ORGANIZED HEALTH CARE EDUCATION/TRAINING PROGRAM

## 2024-07-05 PROCEDURE — 7100000000 HC PACU RECOVERY - FIRST 15 MIN: Performed by: STUDENT IN AN ORGANIZED HEALTH CARE EDUCATION/TRAINING PROGRAM

## 2024-07-05 PROCEDURE — 6370000000 HC RX 637 (ALT 250 FOR IP): Performed by: STUDENT IN AN ORGANIZED HEALTH CARE EDUCATION/TRAINING PROGRAM

## 2024-07-05 PROCEDURE — 88307 TISSUE EXAM BY PATHOLOGIST: CPT

## 2024-07-05 PROCEDURE — 6370000000 HC RX 637 (ALT 250 FOR IP): Performed by: ANESTHESIOLOGY

## 2024-07-05 PROCEDURE — 6360000002 HC RX W HCPCS: Performed by: NURSE ANESTHETIST, CERTIFIED REGISTERED

## 2024-07-05 PROCEDURE — 2500000003 HC RX 250 WO HCPCS: Performed by: NURSE ANESTHETIST, CERTIFIED REGISTERED

## 2024-07-05 PROCEDURE — 7100000010 HC PHASE II RECOVERY - FIRST 15 MIN: Performed by: STUDENT IN AN ORGANIZED HEALTH CARE EDUCATION/TRAINING PROGRAM

## 2024-07-05 PROCEDURE — 2709999900 HC NON-CHARGEABLE SUPPLY: Performed by: STUDENT IN AN ORGANIZED HEALTH CARE EDUCATION/TRAINING PROGRAM

## 2024-07-05 PROCEDURE — 3700000001 HC ADD 15 MINUTES (ANESTHESIA): Performed by: STUDENT IN AN ORGANIZED HEALTH CARE EDUCATION/TRAINING PROGRAM

## 2024-07-05 RX ORDER — SODIUM CHLORIDE 0.9 % (FLUSH) 0.9 %
5-40 SYRINGE (ML) INJECTION EVERY 12 HOURS SCHEDULED
Status: DISCONTINUED | OUTPATIENT
Start: 2024-07-05 | End: 2024-07-05 | Stop reason: HOSPADM

## 2024-07-05 RX ORDER — NALOXONE HYDROCHLORIDE 0.4 MG/ML
INJECTION, SOLUTION INTRAMUSCULAR; INTRAVENOUS; SUBCUTANEOUS PRN
Status: DISCONTINUED | OUTPATIENT
Start: 2024-07-05 | End: 2024-07-05 | Stop reason: HOSPADM

## 2024-07-05 RX ORDER — CEPHALEXIN 500 MG/1
500 CAPSULE ORAL 3 TIMES DAILY
Qty: 15 CAPSULE | Refills: 0 | Status: SHIPPED | OUTPATIENT
Start: 2024-07-05 | End: 2024-07-10

## 2024-07-05 RX ORDER — SODIUM CHLORIDE 9 MG/ML
INJECTION, SOLUTION INTRAVENOUS PRN
Status: DISCONTINUED | OUTPATIENT
Start: 2024-07-05 | End: 2024-07-05 | Stop reason: HOSPADM

## 2024-07-05 RX ORDER — MIDAZOLAM HYDROCHLORIDE 1 MG/ML
INJECTION INTRAMUSCULAR; INTRAVENOUS PRN
Status: DISCONTINUED | OUTPATIENT
Start: 2024-07-05 | End: 2024-07-05 | Stop reason: SDUPTHER

## 2024-07-05 RX ORDER — ONDANSETRON 4 MG/1
4 TABLET, ORALLY DISINTEGRATING ORAL 3 TIMES DAILY PRN
Qty: 11 TABLET | Refills: 0 | Status: SHIPPED | OUTPATIENT
Start: 2024-07-05

## 2024-07-05 RX ORDER — DEXAMETHASONE SODIUM PHOSPHATE 10 MG/ML
INJECTION INTRAMUSCULAR; INTRAVENOUS PRN
Status: DISCONTINUED | OUTPATIENT
Start: 2024-07-05 | End: 2024-07-05 | Stop reason: SDUPTHER

## 2024-07-05 RX ORDER — EPHEDRINE SULFATE/0.9% NACL/PF 50 MG/5 ML
SYRINGE (ML) INTRAVENOUS PRN
Status: DISCONTINUED | OUTPATIENT
Start: 2024-07-05 | End: 2024-07-05 | Stop reason: SDUPTHER

## 2024-07-05 RX ORDER — SUCCINYLCHOLINE CHLORIDE 20 MG/ML
INJECTION INTRAMUSCULAR; INTRAVENOUS PRN
Status: DISCONTINUED | OUTPATIENT
Start: 2024-07-05 | End: 2024-07-05 | Stop reason: SDUPTHER

## 2024-07-05 RX ORDER — SODIUM CHLORIDE 0.9 % (FLUSH) 0.9 %
5-40 SYRINGE (ML) INJECTION PRN
Status: DISCONTINUED | OUTPATIENT
Start: 2024-07-05 | End: 2024-07-05 | Stop reason: HOSPADM

## 2024-07-05 RX ORDER — DIPHENHYDRAMINE HYDROCHLORIDE 50 MG/ML
12.5 INJECTION INTRAMUSCULAR; INTRAVENOUS
Status: DISCONTINUED | OUTPATIENT
Start: 2024-07-05 | End: 2024-07-05 | Stop reason: HOSPADM

## 2024-07-05 RX ORDER — ONDANSETRON 2 MG/ML
INJECTION INTRAMUSCULAR; INTRAVENOUS PRN
Status: DISCONTINUED | OUTPATIENT
Start: 2024-07-05 | End: 2024-07-05 | Stop reason: SDUPTHER

## 2024-07-05 RX ORDER — ONDANSETRON 2 MG/ML
4 INJECTION INTRAMUSCULAR; INTRAVENOUS
Status: DISCONTINUED | OUTPATIENT
Start: 2024-07-05 | End: 2024-07-05 | Stop reason: HOSPADM

## 2024-07-05 RX ORDER — MIDAZOLAM HYDROCHLORIDE 2 MG/2ML
2 INJECTION, SOLUTION INTRAMUSCULAR; INTRAVENOUS
Status: DISCONTINUED | OUTPATIENT
Start: 2024-07-05 | End: 2024-07-05 | Stop reason: HOSPADM

## 2024-07-05 RX ORDER — LIDOCAINE HYDROCHLORIDE 20 MG/ML
INJECTION, SOLUTION EPIDURAL; INFILTRATION; INTRACAUDAL; PERINEURAL PRN
Status: DISCONTINUED | OUTPATIENT
Start: 2024-07-05 | End: 2024-07-05 | Stop reason: SDUPTHER

## 2024-07-05 RX ORDER — PROCHLORPERAZINE EDISYLATE 5 MG/ML
5 INJECTION INTRAMUSCULAR; INTRAVENOUS
Status: DISCONTINUED | OUTPATIENT
Start: 2024-07-05 | End: 2024-07-05 | Stop reason: HOSPADM

## 2024-07-05 RX ORDER — OXYCODONE HYDROCHLORIDE 5 MG/1
5 TABLET ORAL PRN
Status: DISCONTINUED | OUTPATIENT
Start: 2024-07-05 | End: 2024-07-05 | Stop reason: HOSPADM

## 2024-07-05 RX ORDER — OXYCODONE HYDROCHLORIDE AND ACETAMINOPHEN 5; 325 MG/1; MG/1
1 TABLET ORAL EVERY 6 HOURS PRN
Qty: 20 TABLET | Refills: 0 | Status: SHIPPED | OUTPATIENT
Start: 2024-07-05 | End: 2024-07-10

## 2024-07-05 RX ORDER — ROCURONIUM BROMIDE 10 MG/ML
INJECTION, SOLUTION INTRAVENOUS PRN
Status: DISCONTINUED | OUTPATIENT
Start: 2024-07-05 | End: 2024-07-05 | Stop reason: SDUPTHER

## 2024-07-05 RX ORDER — ACETAMINOPHEN 500 MG
1000 TABLET ORAL ONCE
Status: COMPLETED | OUTPATIENT
Start: 2024-07-05 | End: 2024-07-05

## 2024-07-05 RX ORDER — GINSENG 100 MG
CAPSULE ORAL PRN
Status: DISCONTINUED | OUTPATIENT
Start: 2024-07-05 | End: 2024-07-05 | Stop reason: ALTCHOICE

## 2024-07-05 RX ORDER — PROPOFOL 10 MG/ML
INJECTION, EMULSION INTRAVENOUS PRN
Status: DISCONTINUED | OUTPATIENT
Start: 2024-07-05 | End: 2024-07-05 | Stop reason: SDUPTHER

## 2024-07-05 RX ORDER — LIDOCAINE HYDROCHLORIDE AND EPINEPHRINE 10; 10 MG/ML; UG/ML
INJECTION, SOLUTION INFILTRATION; PERINEURAL PRN
Status: DISCONTINUED | OUTPATIENT
Start: 2024-07-05 | End: 2024-07-05 | Stop reason: ALTCHOICE

## 2024-07-05 RX ORDER — SODIUM CHLORIDE, SODIUM LACTATE, POTASSIUM CHLORIDE, CALCIUM CHLORIDE 600; 310; 30; 20 MG/100ML; MG/100ML; MG/100ML; MG/100ML
INJECTION, SOLUTION INTRAVENOUS CONTINUOUS
Status: DISCONTINUED | OUTPATIENT
Start: 2024-07-05 | End: 2024-07-05 | Stop reason: HOSPADM

## 2024-07-05 RX ORDER — LIDOCAINE HYDROCHLORIDE 10 MG/ML
1 INJECTION, SOLUTION INFILTRATION; PERINEURAL
Status: DISCONTINUED | OUTPATIENT
Start: 2024-07-05 | End: 2024-07-05 | Stop reason: HOSPADM

## 2024-07-05 RX ORDER — FENTANYL CITRATE 50 UG/ML
INJECTION, SOLUTION INTRAMUSCULAR; INTRAVENOUS PRN
Status: DISCONTINUED | OUTPATIENT
Start: 2024-07-05 | End: 2024-07-05 | Stop reason: SDUPTHER

## 2024-07-05 RX ORDER — OXYCODONE HYDROCHLORIDE 5 MG/1
10 TABLET ORAL PRN
Status: DISCONTINUED | OUTPATIENT
Start: 2024-07-05 | End: 2024-07-05 | Stop reason: HOSPADM

## 2024-07-05 RX ADMIN — MIDAZOLAM 2 MG: 1 INJECTION INTRAMUSCULAR; INTRAVENOUS at 10:58

## 2024-07-05 RX ADMIN — Medication 10 MG: at 11:07

## 2024-07-05 RX ADMIN — FENTANYL CITRATE 50 MCG: 50 INJECTION, SOLUTION INTRAMUSCULAR; INTRAVENOUS at 11:32

## 2024-07-05 RX ADMIN — PHENYLEPHRINE HYDROCHLORIDE 100 MCG: 0.1 INJECTION, SOLUTION INTRAVENOUS at 11:15

## 2024-07-05 RX ADMIN — PHENYLEPHRINE HYDROCHLORIDE 50 MCG: 0.1 INJECTION, SOLUTION INTRAVENOUS at 12:01

## 2024-07-05 RX ADMIN — ONDANSETRON 4 MG: 2 INJECTION INTRAMUSCULAR; INTRAVENOUS at 11:30

## 2024-07-05 RX ADMIN — PHENYLEPHRINE HYDROCHLORIDE 50 MCG: 0.1 INJECTION, SOLUTION INTRAVENOUS at 11:29

## 2024-07-05 RX ADMIN — LIDOCAINE HYDROCHLORIDE 80 MG: 20 INJECTION, SOLUTION EPIDURAL; INFILTRATION; INTRACAUDAL; PERINEURAL at 11:06

## 2024-07-05 RX ADMIN — PROPOFOL 200 MG: 10 INJECTION, EMULSION INTRAVENOUS at 11:06

## 2024-07-05 RX ADMIN — SODIUM CHLORIDE, SODIUM LACTATE, POTASSIUM CHLORIDE, AND CALCIUM CHLORIDE: 600; 310; 30; 20 INJECTION, SOLUTION INTRAVENOUS at 11:57

## 2024-07-05 RX ADMIN — SODIUM CHLORIDE, SODIUM LACTATE, POTASSIUM CHLORIDE, AND CALCIUM CHLORIDE: 600; 310; 30; 20 INJECTION, SOLUTION INTRAVENOUS at 10:50

## 2024-07-05 RX ADMIN — SODIUM CHLORIDE, SODIUM LACTATE, POTASSIUM CHLORIDE, AND CALCIUM CHLORIDE: 600; 310; 30; 20 INJECTION, SOLUTION INTRAVENOUS at 08:37

## 2024-07-05 RX ADMIN — ACETAMINOPHEN 1000 MG: 500 TABLET, FILM COATED ORAL at 08:22

## 2024-07-05 RX ADMIN — ROCURONIUM BROMIDE 5 MG: 10 INJECTION, SOLUTION INTRAVENOUS at 11:06

## 2024-07-05 RX ADMIN — FENTANYL CITRATE 50 MCG: 50 INJECTION, SOLUTION INTRAMUSCULAR; INTRAVENOUS at 11:06

## 2024-07-05 RX ADMIN — DEXAMETHASONE SODIUM PHOSPHATE 10 MG: 10 INJECTION INTRAMUSCULAR; INTRAVENOUS at 11:30

## 2024-07-05 RX ADMIN — Medication 120 MG: at 11:06

## 2024-07-05 RX ADMIN — Medication 2000 MG: at 11:11

## 2024-07-05 RX ADMIN — Medication 10 MG: at 11:29

## 2024-07-05 RX ADMIN — Medication 5 MG: at 12:01

## 2024-07-05 ASSESSMENT — LIFESTYLE VARIABLES: SMOKING_STATUS: 1

## 2024-07-05 ASSESSMENT — COPD QUESTIONNAIRES: CAT_SEVERITY: MODERATE

## 2024-07-05 ASSESSMENT — PAIN SCALES - GENERAL: PAINLEVEL_OUTOF10: 0

## 2024-07-05 NOTE — ANESTHESIA PRE PROCEDURE
04/25/2024 09:10 AM    CREATININE 0.75 04/25/2024 09:10 AM    GFRAA >60 09/23/2022 09:06 AM    LABGLOM >90 04/25/2024 09:10 AM    GLUCOSE 88 04/25/2024 09:10 AM    CALCIUM 9.4 04/25/2024 09:10 AM    BILITOT 0.6 04/25/2024 09:10 AM    ALKPHOS 74 04/25/2024 09:10 AM    ALKPHOS 75 11/23/2021 08:21 AM    AST 30 04/25/2024 09:10 AM    ALT 16 04/25/2024 09:10 AM       POC Tests: No results for input(s): \"POCGLU\", \"POCNA\", \"POCK\", \"POCCL\", \"POCBUN\", \"POCHEMO\", \"POCHCT\" in the last 72 hours.    Coags: No results found for: \"PROTIME\", \"INR\", \"APTT\"    HCG (If Applicable): No results found for: \"PREGTESTUR\", \"PREGSERUM\", \"HCG\", \"HCGQUANT\"     ABGs: No results found for: \"PHART\", \"PO2ART\", \"CKF1GTM\", \"XUP4FEK\", \"BEART\", \"K9DLJLTC\"     Type & Screen (If Applicable):  No results found for: \"LABABO\"    Drug/Infectious Status (If Applicable):  Lab Results   Component Value Date/Time    HEPCAB NONREACTIVE 05/26/2023 01:54 PM    HEPCAB <0.1 10/16/2017 09:42 AM       COVID-19 Screening (If Applicable): No results found for: \"COVID19\"        Anesthesia Evaluation  Patient summary reviewed and Nursing notes reviewed   no history of anesthetic complications:   Airway: Mallampati: II          Dental:    (+) upper dentures and lower dentures      Pulmonary:normal exam    (+)  COPD: moderate,          current smoker          Patient smoked on day of surgery.                ROS comment: VATs in past for pulmonary nodules, benign--believed related to infliximab   Cardiovascular:  Exercise tolerance: no interval change  (+) hypertension:, hyperlipidemia        Rhythm: regular  Rate: normal                 ROS comment: Echo from 2019 showed normal EF, no valvular abnormalities     Neuro/Psych:   Negative Neuro/Psych ROS              GI/Hepatic/Renal:   (+) GERD:     (-) liver disease and no renal disease       Endo/Other:    (+) : arthritis: rheumatoid..    (-) diabetes mellitus, hypothyroidism               Abdominal: normal exam

## 2024-07-05 NOTE — H&P
[facility] ENT Office Note     Patient: Brennon Saleem  MRN: [id number]  : 1950  Gender:  male  Vital Signs: /63 (Site: Left Upper Arm, Position: Sitting)   Ht 1.778 m (5' 10\")   Wt 66.2 kg (146 lb)   BMI 20.95 kg/m²   Date: 2024     CC:        Chief Complaint   Patient presents with    New Patient       Patient presents today with a neck mass.          HPI:  Brennon Saleem is a 73 y.o. male here for evaluation of right parotid mass.  Notes from referring provider reviewed.  He endorses growing right parotid mass has been present for 1 month.  He denies any pain.  He does smoke.  He also endorses reflux.     Past Medical History, Past Surgical History, Family history, Social History, and Medications were all reviewed with the patient today and updated as necessary.            Allergies   Allergen Reactions    Meloxicam Other (See Comments)       Cause BP elevation          Patient Active Problem List   Diagnosis    Mixed hyperlipidemia    Coronary artery disease due to calcified coronary lesion    Rheumatoid arthritis involving multiple sites with positive rheumatoid factor (HCC)    Essential hypertension    Pulmonary nodule           Current Outpatient Medications   Medication Sig    famotidine (PEPCID) 40 MG tablet Take 1 tablet by mouth every evening    acetaminophen (TYLENOL) 500 MG tablet Take 1 tablet by mouth daily    vitamin E 400 UNIT capsule Take 1 capsule by mouth daily    hydroxychloroquine (PLAQUENIL) 200 MG tablet Take 2 pills once a day after food alternating with 1 pill once a day after food.    adalimumab (HUMIRA, 2 PEN,) 80 MG/0.8ML PNKT Inject 80 mg into the skin every 14 days    ketoconazole (NIZORAL) 2 % shampoo Apply 2 g topically daily as needed for Itching    triamcinolone (KENALOG) 0.1 % cream Apply 2 g topically 2 times daily    amLODIPine (NORVASC) 5 MG tablet Take 1 tablet by mouth daily    rosuvastatin (CRESTOR) 20 MG tablet Take 1  Diabetes Father      Stroke Father      Diabetes Brother      Lung Disease Brother              ROS:     Review of Systems   Constitutional:  Negative for activity change and appetite change.   HENT:  Negative for congestion, ear discharge and ear pain.    Eyes:  Negative for redness and itching.   Respiratory:  Negative for shortness of breath.    Cardiovascular:  Negative for chest pain.   Gastrointestinal:  Negative for vomiting.   Endocrine: Negative for cold intolerance and heat intolerance.   Allergic/Immunologic: Negative for environmental allergies.   Neurological:  Negative for light-headedness.   Psychiatric/Behavioral:  Negative for sleep disturbance.             PHYSICAL EXAM:     /63 (Site: Left Upper Arm, Position: Sitting)   Ht 1.778 m (5' 10\")   Wt 66.2 kg (146 lb)   BMI 20.95 kg/m²      General: NAD, well-appearing  Neuro: No gross neuro deficits. CN's II-XII intact. No facial weakness.  Eyes: EOMI. Pupils reactive. No periorbital edema/ecchymosis.   Skin: No facial erythema, rashes or concerning lesions.  Nose: No external deviations or saddling. Intranasally, septum is midline without perforations, nasal mucosa appears healthy with no erythema, mucopurulence, or polyps.  Mouth: Moist mucus membranes, normal tongue/palate mobility, no concerning mucosal lesions.   Oropharynx: clear with no erythema/exudate, no tonsillar hypertrophy.  Ears: Normal appearing auricles, no hematomas. EACs clear with no cerumen impaction, healthy canal skin, TM's intact with no perforations or retraction pockets. No middle ear effusions.  Neck: Soft, supple, no palpable lateral neck masses.  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  Resp/Lungs: No audible stridor or wheezing, CTAB  Heart: RRR  Extremities: No clubbing or cyanosis.           Lab Results   Component Value Date     WBC 7.9 04/25/2024     HGB 15.5 04/25/2024     HCT 46.8 04/25/2024     .1 (H) 04/25/2024

## 2024-07-05 NOTE — ANESTHESIA POSTPROCEDURE EVALUATION
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])    Surgeons: Louie Underwood MD Responsible Provider: Kurtis Nguyen MD    Anesthesia Type: General ASA Status: 3            Anesthesia Type: General    Demond Phase I: Demond Score: 10    Demond Phase II:      Anesthesia Post Evaluation    Patient location during evaluation: PACU  Patient participation: complete - patient participated  Level of consciousness: awake and alert  Airway patency: patent  Nausea & Vomiting: no nausea and no vomiting  Cardiovascular status: hemodynamically stable  Respiratory status: acceptable, nonlabored ventilation and spontaneous ventilation  Hydration status: euvolemic  Comments: /75   Pulse 80   Temp 98.8 °F (37.1 °C) (Temporal)   Resp 15   Ht 1.778 m (5' 10\")   Wt 64.5 kg (142 lb 3.2 oz)   SpO2 96%   BMI 20.40 kg/m²     Multimodal analgesia pain management approach  Pain management: adequate and satisfactory to patient        No notable events documented.

## 2024-07-05 NOTE — OP NOTE
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    Detailed Description of Procedure:   The patient was identified in the preoperative holding area.  Informed consent was obtained.  The patient was taken back to the operating room suite laid supine on the operating table.  Anesthesia was induced and the patient was intubated without complication.  The planned incision site was cleaned, marked, and injected with local anesthesia.  A preoperative timeout had been performed.  The neuromonitoring system was set up, calibrated, and found to be functioning appropriately.  Electrodes were placed in the orbicularis oris and orbicularis oculi with ground electrodes in the sternum.  Next the patient was prepped and draped in the usual sterile fashion.  #15 blade was used to make a modified Jignesh incision on the right side.  Right-sided anterior parotidomasseteric flap was raised using #15 blade and Metzenbaum scissors.  In the neck #15 blade and Metzenbaum scissors were used to raise a superior subplatysmal flap.  The anterior aspect of the SCM was identified and dissected out.  The great auricular nerve was identified, dissected out and preserved.  EJ was preserved.

## 2024-07-11 ENCOUNTER — OFFICE VISIT (OUTPATIENT)
Dept: ENT CLINIC | Age: 74
End: 2024-07-11

## 2024-07-11 VITALS — HEIGHT: 70 IN | WEIGHT: 142 LBS | BODY MASS INDEX: 20.33 KG/M2 | RESPIRATION RATE: 19 BRPM

## 2024-07-11 DIAGNOSIS — D11.9 WARTHIN TUMOR: Primary | ICD-10-CM

## 2024-07-11 PROCEDURE — 99024 POSTOP FOLLOW-UP VISIT: CPT | Performed by: STUDENT IN AN ORGANIZED HEALTH CARE EDUCATION/TRAINING PROGRAM

## 2024-07-11 RX ORDER — FLUTICASONE PROPIONATE 0.05 %
CREAM (GRAM) TOPICAL
COMMUNITY
Start: 2024-06-17

## 2024-07-11 ASSESSMENT — ENCOUNTER SYMPTOMS
BACK PAIN: 0
EYE DISCHARGE: 0
ALLERGIC/IMMUNOLOGIC NEGATIVE: 1
RESPIRATORY NEGATIVE: 1
GASTROINTESTINAL NEGATIVE: 1
COLOR CHANGE: 0
CHEST TIGHTNESS: 0
VOMITING: 0
EYES NEGATIVE: 1

## 2024-07-11 NOTE — PROGRESS NOTES
palpable thyroid nodules.  Right neck sutures removed, expected shiela-incisional edema.  Lymphatics: No palpable cervical LAD.  Resp/Lungs: No audible stridor or wheezing, CTAB  Heart: RRR  Extremities: No clubbing or cyanosis.    DIAGNOSIS       A:  \" RIGHT PAROTID\":         WARTHIN'S TUMOR     ASSESSMENT and PLAN  He will follow back up as needed.    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  Please excuse any typos.

## 2024-08-07 ENCOUNTER — OFFICE VISIT (OUTPATIENT)
Dept: INTERNAL MEDICINE CLINIC | Facility: CLINIC | Age: 74
End: 2024-08-07
Payer: MEDICARE

## 2024-08-07 VITALS
RESPIRATION RATE: 16 BRPM | HEART RATE: 80 BPM | SYSTOLIC BLOOD PRESSURE: 131 MMHG | OXYGEN SATURATION: 98 % | DIASTOLIC BLOOD PRESSURE: 87 MMHG | TEMPERATURE: 98.4 F | HEIGHT: 70 IN | BODY MASS INDEX: 20.47 KG/M2 | WEIGHT: 143 LBS

## 2024-08-07 DIAGNOSIS — I10 ESSENTIAL HYPERTENSION: ICD-10-CM

## 2024-08-07 DIAGNOSIS — M05.79 RHEUMATOID ARTHRITIS INVOLVING MULTIPLE SITES WITH POSITIVE RHEUMATOID FACTOR (HCC): ICD-10-CM

## 2024-08-07 DIAGNOSIS — E78.2 MIXED HYPERLIPIDEMIA: ICD-10-CM

## 2024-08-07 DIAGNOSIS — I25.10 CORONARY ARTERY DISEASE DUE TO CALCIFIED CORONARY LESION: Primary | ICD-10-CM

## 2024-08-07 DIAGNOSIS — I25.84 CORONARY ARTERY DISEASE DUE TO CALCIFIED CORONARY LESION: ICD-10-CM

## 2024-08-07 DIAGNOSIS — I25.84 CORONARY ARTERY DISEASE DUE TO CALCIFIED CORONARY LESION: Primary | ICD-10-CM

## 2024-08-07 DIAGNOSIS — Z12.5 PROSTATE CANCER SCREENING: ICD-10-CM

## 2024-08-07 DIAGNOSIS — I25.10 CORONARY ARTERY DISEASE DUE TO CALCIFIED CORONARY LESION: ICD-10-CM

## 2024-08-07 LAB
ALBUMIN SERPL-MCNC: 3.9 G/DL (ref 3.2–4.6)
ALBUMIN/GLOB SERPL: 1 (ref 1–1.9)
ALP SERPL-CCNC: 70 U/L (ref 40–129)
ALT SERPL-CCNC: 17 U/L (ref 12–65)
ANION GAP SERPL CALC-SCNC: 11 MMOL/L (ref 9–18)
APPEARANCE UR: CLEAR
AST SERPL-CCNC: 35 U/L (ref 15–37)
BASOPHILS # BLD: 0.1 K/UL (ref 0–0.2)
BASOPHILS NFR BLD: 1 % (ref 0–2)
BILIRUB DIRECT SERPL-MCNC: 0.2 MG/DL (ref 0–0.4)
BILIRUB SERPL-MCNC: 0.7 MG/DL (ref 0–1.2)
BILIRUB UR QL: NEGATIVE
BUN SERPL-MCNC: 9 MG/DL (ref 8–23)
CALCIUM SERPL-MCNC: 9.2 MG/DL (ref 8.8–10.2)
CHLORIDE SERPL-SCNC: 104 MMOL/L (ref 98–107)
CHOLEST SERPL-MCNC: 122 MG/DL (ref 0–200)
CO2 SERPL-SCNC: 25 MMOL/L (ref 20–28)
COLOR UR: ABNORMAL
CREAT SERPL-MCNC: 0.75 MG/DL (ref 0.8–1.3)
DIFFERENTIAL METHOD BLD: ABNORMAL
EOSINOPHIL # BLD: 0.4 K/UL (ref 0–0.8)
EOSINOPHIL NFR BLD: 5 % (ref 0.5–7.8)
ERYTHROCYTE [DISTWIDTH] IN BLOOD BY AUTOMATED COUNT: 13.1 % (ref 11.9–14.6)
GLOBULIN SER CALC-MCNC: 3.9 G/DL (ref 2.3–3.5)
GLUCOSE SERPL-MCNC: 97 MG/DL (ref 70–99)
GLUCOSE UR STRIP.AUTO-MCNC: NEGATIVE MG/DL
HCT VFR BLD AUTO: 44.5 % (ref 41.1–50.3)
HDLC SERPL-MCNC: 75 MG/DL (ref 40–60)
HDLC SERPL: 1.6 (ref 0–5)
HGB BLD-MCNC: 14.8 G/DL (ref 13.6–17.2)
HGB UR QL STRIP: NEGATIVE
IMM GRANULOCYTES # BLD AUTO: 0 K/UL (ref 0–0.5)
IMM GRANULOCYTES NFR BLD AUTO: 0 % (ref 0–5)
KETONES UR QL STRIP.AUTO: 15 MG/DL
LDLC SERPL CALC-MCNC: 37 MG/DL (ref 0–100)
LEUKOCYTE ESTERASE UR QL STRIP.AUTO: NEGATIVE
LYMPHOCYTES # BLD: 1.8 K/UL (ref 0.5–4.6)
LYMPHOCYTES NFR BLD: 24 % (ref 13–44)
MCH RBC QN AUTO: 33.9 PG (ref 26.1–32.9)
MCHC RBC AUTO-ENTMCNC: 33.3 G/DL (ref 31.4–35)
MCV RBC AUTO: 102.1 FL (ref 82–102)
MONOCYTES # BLD: 0.9 K/UL (ref 0.1–1.3)
MONOCYTES NFR BLD: 13 % (ref 4–12)
NEUTS SEG # BLD: 4.2 K/UL (ref 1.7–8.2)
NEUTS SEG NFR BLD: 57 % (ref 43–78)
NITRITE UR QL STRIP.AUTO: NEGATIVE
NRBC # BLD: 0 K/UL (ref 0–0.2)
PH UR STRIP: 5.5 (ref 5–9)
PLATELET # BLD AUTO: 190 K/UL (ref 150–450)
PMV BLD AUTO: 9.8 FL (ref 9.4–12.3)
POTASSIUM SERPL-SCNC: 4.2 MMOL/L (ref 3.5–5.1)
PROT SERPL-MCNC: 7.8 G/DL (ref 6.3–8.2)
PROT UR STRIP-MCNC: NEGATIVE MG/DL
PSA SERPL-MCNC: 3.2 NG/ML (ref 0–4)
RBC # BLD AUTO: 4.36 M/UL (ref 4.23–5.6)
SODIUM SERPL-SCNC: 141 MMOL/L (ref 136–145)
SP GR UR REFRACTOMETRY: 1.02 (ref 1–1.02)
TRIGL SERPL-MCNC: 51 MG/DL (ref 0–150)
TSH, 3RD GENERATION: 1.49 UIU/ML (ref 0.27–4.2)
UROBILINOGEN UR QL STRIP.AUTO: 1 EU/DL (ref 0.2–1)
VLDLC SERPL CALC-MCNC: 10 MG/DL (ref 6–23)
WBC # BLD AUTO: 7.3 K/UL (ref 4.3–11.1)

## 2024-08-07 PROCEDURE — 3017F COLORECTAL CA SCREEN DOC REV: CPT | Performed by: INTERNAL MEDICINE

## 2024-08-07 PROCEDURE — 99214 OFFICE O/P EST MOD 30 MIN: CPT | Performed by: INTERNAL MEDICINE

## 2024-08-07 PROCEDURE — 4004F PT TOBACCO SCREEN RCVD TLK: CPT | Performed by: INTERNAL MEDICINE

## 2024-08-07 PROCEDURE — G8427 DOCREV CUR MEDS BY ELIG CLIN: HCPCS | Performed by: INTERNAL MEDICINE

## 2024-08-07 PROCEDURE — 1123F ACP DISCUSS/DSCN MKR DOCD: CPT | Performed by: INTERNAL MEDICINE

## 2024-08-07 PROCEDURE — G8420 CALC BMI NORM PARAMETERS: HCPCS | Performed by: INTERNAL MEDICINE

## 2024-08-07 PROCEDURE — G2211 COMPLEX E/M VISIT ADD ON: HCPCS | Performed by: INTERNAL MEDICINE

## 2024-08-07 PROCEDURE — 3079F DIAST BP 80-89 MM HG: CPT | Performed by: INTERNAL MEDICINE

## 2024-08-07 PROCEDURE — 3075F SYST BP GE 130 - 139MM HG: CPT | Performed by: INTERNAL MEDICINE

## 2024-08-07 NOTE — PROGRESS NOTES
Breath sounds: Normal breath sounds. No stridor. No wheezing, rhonchi or rales.   Abdominal:      General: Abdomen is flat. Bowel sounds are normal.      Palpations: Abdomen is soft.   Musculoskeletal:      Right lower leg: No edema.      Left lower leg: No edema.   Skin:     General: Skin is warm and dry.   Neurological:      General: No focal deficit present.      Mental Status: He is alert and oriented to person, place, and time. Mental status is at baseline.   Psychiatric:         Mood and Affect: Mood normal.         Behavior: Behavior normal.         Thought Content: Thought content normal.         Judgment: Judgment normal.        ASSESSMENT AND PLAN:    CAD: Ca Score 11/20/18 = 1035.  Follows with Cardiology (Dr. Luke).  On Asa.  Risk factors medically modified per below.  Taking meds w/o probs.  Asymptomatic w/o angina or PLAZA.  Well controlled.  Continue Asa (No bleeding or falls).  Continue risk factor modification per below.  Follow up with Dr. Luke.   HTN: Taking Current regimen (Norvasc 5) w/o probs.  Home BPs = 120s/70s.  Well controlled.  Continue current regimen.  Asked to monitor BP at home, call me if it runs above 140/80, and bring in a log next time.  36  HLD: Taking current regimen (Crestor 20) w/o probs.  Well controlled.  Continue current regimen.   FLPs:  2/2012 Untreated = [183/109/50/122].   12/4/23 on Crestor 20 = [145/43.2/90/59].   8/7/24 on Crestor 20 = [  RA: Follows with Rheumatology (Dr. Ernst) on Humira and Plaquenil.  Well Controlled.  Follow up with Dr. Ernst.   HCM: Recommend tobacco cessation.   Colonoscopy: 11/18/19 by Dr. Cleary = 2 Polyps; 5 Yrs (Per an office note).   Prostate:  PSA  1/9/13 = 0.5.   11/12/18 = 1.3.   8/2/23 = 0.7.   8/7/24 =   TDap: 7/2019.  Flu: Elsewhere 2023.  Recommend yearly flu vaccine to lower risk for preventable morbidity/mortality.   Covid: Recommend staying UTD on Covid vaccinations/boosters.   F/u: 4 Mos w/ no labs.

## 2024-08-20 ENCOUNTER — TRANSCRIBE ORDERS (OUTPATIENT)
Dept: SCHEDULING | Age: 74
End: 2024-08-20

## 2024-08-20 DIAGNOSIS — R13.19 ESOPHAGEAL DYSPHAGIA: Primary | ICD-10-CM

## 2024-08-23 DIAGNOSIS — I10 ESSENTIAL HYPERTENSION: ICD-10-CM

## 2024-08-26 ENCOUNTER — HOSPITAL ENCOUNTER (OUTPATIENT)
Dept: GENERAL RADIOLOGY | Age: 74
Discharge: HOME | End: 2024-08-29
Attending: INTERNAL MEDICINE
Payer: MEDICARE

## 2024-08-26 DIAGNOSIS — R13.19 ESOPHAGEAL DYSPHAGIA: ICD-10-CM

## 2024-08-26 PROCEDURE — 6370000000 HC RX 637 (ALT 250 FOR IP): Performed by: INTERNAL MEDICINE

## 2024-08-26 PROCEDURE — 2500000003 HC RX 250 WO HCPCS: Performed by: INTERNAL MEDICINE

## 2024-08-26 PROCEDURE — 74220 X-RAY XM ESOPHAGUS 1CNTRST: CPT

## 2024-08-26 RX ORDER — AMLODIPINE BESYLATE 5 MG/1
5 TABLET ORAL DAILY
Qty: 90 TABLET | Refills: 1 | Status: SHIPPED | OUTPATIENT
Start: 2024-08-26

## 2024-08-26 RX ADMIN — BARIUM SULFATE 140 ML: 980 POWDER, FOR SUSPENSION ORAL at 10:23

## 2024-08-26 RX ADMIN — ANTACID/ANTIFLATULENT 1 EACH: 380; 550; 10; 10 GRANULE, EFFERVESCENT ORAL at 10:23

## 2024-08-26 RX ADMIN — BARIUM SULFATE 1 TABLET: 700 TABLET ORAL at 10:23

## 2024-08-28 ENCOUNTER — OFFICE VISIT (OUTPATIENT)
Dept: RHEUMATOLOGY | Age: 74
End: 2024-08-28
Payer: MEDICARE

## 2024-08-28 VITALS
HEIGHT: 70 IN | WEIGHT: 142 LBS | SYSTOLIC BLOOD PRESSURE: 122 MMHG | HEART RATE: 78 BPM | BODY MASS INDEX: 20.33 KG/M2 | DIASTOLIC BLOOD PRESSURE: 81 MMHG

## 2024-08-28 DIAGNOSIS — M05.79 SEROPOSITIVE RHEUMATOID ARTHRITIS OF MULTIPLE SITES (HCC): Primary | ICD-10-CM

## 2024-08-28 DIAGNOSIS — M05.79 SEROPOSITIVE RHEUMATOID ARTHRITIS OF MULTIPLE SITES (HCC): ICD-10-CM

## 2024-08-28 DIAGNOSIS — Z11.1 SCREENING EXAMINATION FOR PULMONARY TUBERCULOSIS: ICD-10-CM

## 2024-08-28 DIAGNOSIS — Z79.899 LONG-TERM USE OF HIGH-RISK MEDICATION: ICD-10-CM

## 2024-08-28 LAB — ERYTHROCYTE [SEDIMENTATION RATE] IN BLOOD: 8 MM/HR

## 2024-08-28 PROCEDURE — 4004F PT TOBACCO SCREEN RCVD TLK: CPT | Performed by: INTERNAL MEDICINE

## 2024-08-28 PROCEDURE — 3017F COLORECTAL CA SCREEN DOC REV: CPT | Performed by: INTERNAL MEDICINE

## 2024-08-28 PROCEDURE — 3079F DIAST BP 80-89 MM HG: CPT | Performed by: INTERNAL MEDICINE

## 2024-08-28 PROCEDURE — 1123F ACP DISCUSS/DSCN MKR DOCD: CPT | Performed by: INTERNAL MEDICINE

## 2024-08-28 PROCEDURE — G8420 CALC BMI NORM PARAMETERS: HCPCS | Performed by: INTERNAL MEDICINE

## 2024-08-28 PROCEDURE — G2211 COMPLEX E/M VISIT ADD ON: HCPCS | Performed by: INTERNAL MEDICINE

## 2024-08-28 PROCEDURE — 99214 OFFICE O/P EST MOD 30 MIN: CPT | Performed by: INTERNAL MEDICINE

## 2024-08-28 PROCEDURE — G8427 DOCREV CUR MEDS BY ELIG CLIN: HCPCS | Performed by: INTERNAL MEDICINE

## 2024-08-28 PROCEDURE — 3074F SYST BP LT 130 MM HG: CPT | Performed by: INTERNAL MEDICINE

## 2024-08-28 RX ORDER — ADALIMUMAB 80MG/0.8ML
80 KIT SUBCUTANEOUS
Qty: 6 EACH | Refills: 1 | Status: ACTIVE | OUTPATIENT
Start: 2024-08-28

## 2024-08-28 ASSESSMENT — ROUTINE ASSESSMENT OF PATIENT INDEX DATA (RAPID3)
ON A SCALE OF ONE TO TEN, HOW DIFFICULT WAS IT FOR YOU TO COMPLETE THE LISTED DAILY PHYSICAL TASKS OVER THE LAST WEEK: 0.5
ON A SCALE OF ONE TO TEN, CONSIDERING ALL THE WAYS IN WHICH ILLNESS AND HEALTH CONDITIONS MAY AFFECT YOU AT THIS TIME, PLEASE INDICATE BELOW HOW YOU ARE DOING:: 5
ON A SCALE OF ONE TO TEN, HOW MUCH PAIN HAVE YOU HAD BECAUSE OF YOUR CONDITION OVER THE PAST WEEK?: 4
ON A SCALE OF ONE TO TEN, HOW MUCH OF A PROBLEM HAS UNUSUAL FATIGUE OR TIREDNESS BEEN FOR YOU OVER THE PAST WEEK?: 3
WHEN YOU AWAKENED IN THE MORNING OVER THE LAST WEEK, PLEASE INDICATE THE AMOUNT OF TIME IT TAKES UNTIL YOU ARE AS LIMBER AS YOU WILL BE FOR THE DAY: 15 MIN

## 2024-08-28 ASSESSMENT — JOINT PAIN
TOTAL NUMBER OF SWOLLEN JOINTS: 0
TOTAL NUMBER OF TENDER JOINTS: 0

## 2024-08-28 NOTE — PROGRESS NOTES
[facility]ology  Ginny Ernst M.D.  [address] , [address]   [place]  Office : [phone], Fax: [phone]     RHEUMATOLOGY OFFICE VISIT NOTE  Date of Visit:  2024 10:07 AM    Patient Information:  Name:  Brennon Saleem  :  1950  Age:  73 y.o.   Gender:  male      Mr. Saleem is here today for follow-up of RA and medication monitoring.     Last visit: 24    History of Present Illness: On talking to the patient today he states that he has had some congestion with some PND with a cough which is dry. Denies any shortness of breath with no chest pain though.  Patient's last eye exam from 04/15/24 at [facility], did show presence of possible Plaquenil toxicity involving the right eye.  Hence the dosage of his Plaquenil was decreased from 1 pill every day to 1 pill 3 days of the week which she was instructed to remain on until he sees me back.  Since he last saw me he has had a right parotidectomy in 2024, but did not have to skip administering the Humira though.  His current joint complaints are as mentioned below.    Since the last visit, patient is feeling \"good\".    Pain: 4/10  Location:  Some neck stiffness with limited neck ROM with occasional headaches. Some para spinal muscle pain. Some lower back pain. No mid back pain. Some right knee pain with occasional buckling with no warmth and redness. Some pain in the ball of his feet.   Quality:  Sharp pain in the right knee.   Modifying Factors:  Going up stairs worsens the knee pain.   Associated Symptoms:  No tingling, numbness or pain down the arms or legs. No UE or LE weakness. No limitations with his ADL's.         2024     7:00 AM   DMARD/Biologic   AM Stiffness 15 min   Pain 4   Fatigue 3   MDHAQ 0.5   Patient Global Score 5   Medication Name Plaquenil   Medication Name Humira     Last TB screen: 2023  TB result: Negative      Current dose of steroids: None  Blood, Urine 08/07/2024 Negative  Negative   Final    Urobilinogen, Urine 08/07/2024 1.0  0.2 - 1.0 EU/dL Final    Nitrite, Urine 08/07/2024 Negative  Negative   Final    Leukocyte Esterase, Urine 08/07/2024 Negative  Negative   Final   Hospital Outpatient Visit on 06/28/2024   Component Date Value Ref Range Status    Hemoglobin 06/28/2024 14.6  13.6 - 17.2 g/dL Final     The results above were reviewed and discussed with patient.       Assessment/Plan:   Brennon Saleem is a 73 y.o. male who presents with:     Seropositive rheumatoid arthritis of multiple sites (HCC): With the possibility of Plaquenil toxicity involving the right eye I did instruct him to decrease the Plaquenil dose to taking 200 mg 1 pill 3 days of the week and no Plaquenil the other 4 days.  He was instructed to remain on Humira 80 mg one-shot to administer to himself every 2 weeks.  Patient is aware that if he is sick requiring him to be on an antibiotic or antiviral drug he will need to skip administering the Humira until he has completed the antibiotic/antiviral course and is over the infection.   -     adalimumab (HUMIRA, 2 PEN,) 80 MG/0.8ML PNKT; Inject 80 mg into the skin every 14 days  -     Sedimentation Rate; Future    Screening examination for pulmonary tuberculosis: I will keep the patient informed accordingly.  -     Quantiferon, Incubated; Future    Long-term use of high-risk medication: Since patient did have a BMP, LFTs and a CBC with differential done 8/7/2024 that was reviewed by me with the patient I did not feel the need to repeat those labs again today.    Disease activity plan:  As stated above.    Steroid management plan:  As stated above, if applicable.    Pain management plan:  As stated above, if applicable.    Weight management plan:  Weight loss through diet and exercise is always encouraged    Disease prognosis: Good    I appreciate the opportunity to continue to participate in the care of this patient.

## 2024-09-03 LAB
M TB IFN-G BLD-IMP: ABNORMAL
M TB IFN-G CD4+ T-CELLS BLD-ACNC: >10 IU/ML
M TBIFN-G CD4+ CD8+T-CELLS BLD-ACNC: >10 IU/ML
QUANTIFERON CRITERIA: ABNORMAL
QUANTIFERON MITOGEN VALUE: >10 IU/ML
QUANTIFERON NIL VALUE: >10 IU/ML

## 2024-09-09 ENCOUNTER — LAB (OUTPATIENT)
Dept: INTERNAL MEDICINE CLINIC | Facility: CLINIC | Age: 74
End: 2024-09-09

## 2024-09-09 DIAGNOSIS — R97.20 ELEVATED PSA: Primary | ICD-10-CM

## 2024-09-10 LAB
PSA SERPL DL<=0.01 NG/ML-MCNC: 1.04 NG/ML (ref 0–4)
PSA SERPL DL<=0.01 NG/ML-MCNC: 1.04 NG/ML (ref 0–4)

## 2024-12-02 DIAGNOSIS — E78.2 MIXED HYPERLIPIDEMIA: ICD-10-CM

## 2024-12-02 DIAGNOSIS — I25.10 CORONARY ARTERY DISEASE DUE TO CALCIFIED CORONARY LESION: ICD-10-CM

## 2024-12-02 DIAGNOSIS — I10 ESSENTIAL HYPERTENSION: ICD-10-CM

## 2024-12-02 DIAGNOSIS — I25.84 CORONARY ARTERY DISEASE DUE TO CALCIFIED CORONARY LESION: ICD-10-CM

## 2024-12-02 RX ORDER — ROSUVASTATIN CALCIUM 20 MG/1
20 TABLET, COATED ORAL EVERY EVENING
Qty: 90 TABLET | Refills: 0 | Status: SHIPPED | OUTPATIENT
Start: 2024-12-02

## 2024-12-02 RX ORDER — AMLODIPINE BESYLATE 5 MG/1
5 TABLET ORAL DAILY
Qty: 90 TABLET | Refills: 0 | Status: SHIPPED | OUTPATIENT
Start: 2024-12-02

## 2024-12-10 ENCOUNTER — OFFICE VISIT (OUTPATIENT)
Dept: INTERNAL MEDICINE CLINIC | Facility: CLINIC | Age: 74
End: 2024-12-10

## 2024-12-10 VITALS
DIASTOLIC BLOOD PRESSURE: 69 MMHG | OXYGEN SATURATION: 99 % | WEIGHT: 143 LBS | HEART RATE: 80 BPM | TEMPERATURE: 98.2 F | BODY MASS INDEX: 20.47 KG/M2 | SYSTOLIC BLOOD PRESSURE: 118 MMHG | HEIGHT: 70 IN

## 2024-12-10 DIAGNOSIS — E78.2 MIXED HYPERLIPIDEMIA: ICD-10-CM

## 2024-12-10 DIAGNOSIS — I10 ESSENTIAL HYPERTENSION: ICD-10-CM

## 2024-12-10 DIAGNOSIS — I25.10 CORONARY ARTERY DISEASE DUE TO CALCIFIED CORONARY LESION: ICD-10-CM

## 2024-12-10 DIAGNOSIS — Z00.00 MEDICARE ANNUAL WELLNESS VISIT, SUBSEQUENT: Primary | ICD-10-CM

## 2024-12-10 DIAGNOSIS — I25.84 CORONARY ARTERY DISEASE DUE TO CALCIFIED CORONARY LESION: ICD-10-CM

## 2024-12-10 DIAGNOSIS — J84.9 ILD (INTERSTITIAL LUNG DISEASE) (HCC): ICD-10-CM

## 2024-12-10 DIAGNOSIS — N52.9 IMPOTENCE DUE TO ERECTILE DYSFUNCTION: ICD-10-CM

## 2024-12-10 DIAGNOSIS — Z87.891 PERSONAL HISTORY OF TOBACCO USE: ICD-10-CM

## 2024-12-10 DIAGNOSIS — M05.79 RHEUMATOID ARTHRITIS INVOLVING MULTIPLE SITES WITH POSITIVE RHEUMATOID FACTOR (HCC): ICD-10-CM

## 2024-12-10 RX ORDER — AMLODIPINE BESYLATE 5 MG/1
5 TABLET ORAL DAILY
Qty: 90 TABLET | Refills: 1 | Status: SHIPPED | OUTPATIENT
Start: 2024-12-10

## 2024-12-10 RX ORDER — ROSUVASTATIN CALCIUM 20 MG/1
20 TABLET, COATED ORAL EVERY EVENING
Qty: 90 TABLET | Refills: 1 | Status: SHIPPED | OUTPATIENT
Start: 2024-12-10

## 2024-12-10 RX ORDER — SILDENAFIL 100 MG/1
100 TABLET, FILM COATED ORAL DAILY PRN
Qty: 10 TABLET | Refills: 5 | Status: SHIPPED | OUTPATIENT
Start: 2024-12-10

## 2024-12-10 SDOH — HEALTH STABILITY: PHYSICAL HEALTH: ON AVERAGE, HOW MANY MINUTES DO YOU ENGAGE IN EXERCISE AT THIS LEVEL?: 60 MIN

## 2024-12-10 SDOH — HEALTH STABILITY: PHYSICAL HEALTH: ON AVERAGE, HOW MANY DAYS PER WEEK DO YOU ENGAGE IN MODERATE TO STRENUOUS EXERCISE (LIKE A BRISK WALK)?: 7 DAYS

## 2024-12-10 ASSESSMENT — PATIENT HEALTH QUESTIONNAIRE - PHQ9
SUM OF ALL RESPONSES TO PHQ QUESTIONS 1-9: 0
1. LITTLE INTEREST OR PLEASURE IN DOING THINGS: NOT AT ALL
SUM OF ALL RESPONSES TO PHQ9 QUESTIONS 1 & 2: 0
SUM OF ALL RESPONSES TO PHQ QUESTIONS 1-9: 0
SUM OF ALL RESPONSES TO PHQ QUESTIONS 1-9: 0
2. FEELING DOWN, DEPRESSED OR HOPELESS: NOT AT ALL
SUM OF ALL RESPONSES TO PHQ QUESTIONS 1-9: 0

## 2024-12-10 ASSESSMENT — LIFESTYLE VARIABLES
HOW OFTEN DURING THE LAST YEAR HAVE YOU BEEN UNABLE TO REMEMBER WHAT HAPPENED THE NIGHT BEFORE BECAUSE YOU HAD BEEN DRINKING: LESS THAN MONTHLY
HOW OFTEN DURING THE LAST YEAR HAVE YOU FAILED TO DO WHAT WAS NORMALLY EXPECTED FROM YOU BECAUSE OF DRINKING: NEVER
HAS A RELATIVE, FRIEND, DOCTOR, OR ANOTHER HEALTH PROFESSIONAL EXPRESSED CONCERN ABOUT YOUR DRINKING OR SUGGESTED YOU CUT DOWN: YES, BUT NOT IN THE PAST YEAR
HOW OFTEN DURING THE LAST YEAR HAVE YOU NEEDED AN ALCOHOLIC DRINK FIRST THING IN THE MORNING TO GET YOURSELF GOING AFTER A NIGHT OF HEAVY DRINKING: NEVER
HOW OFTEN DO YOU HAVE A DRINK CONTAINING ALCOHOL: 4 OR MORE TIMES A WEEK
HOW OFTEN DURING THE LAST YEAR HAVE YOU FOUND THAT YOU WERE NOT ABLE TO STOP DRINKING ONCE YOU HAD STARTED: NEVER
HAVE YOU OR SOMEONE ELSE BEEN INJURED AS A RESULT OF YOUR DRINKING: NO
HOW OFTEN DO YOU HAVE A DRINK CONTAINING ALCOHOL: 5
HOW MANY STANDARD DRINKS CONTAINING ALCOHOL DO YOU HAVE ON A TYPICAL DAY: 1
HOW MANY STANDARD DRINKS CONTAINING ALCOHOL DO YOU HAVE ON A TYPICAL DAY: 1 OR 2
HOW OFTEN DO YOU HAVE SIX OR MORE DRINKS ON ONE OCCASION: 1
HOW OFTEN DURING THE LAST YEAR HAVE YOU FAILED TO DO WHAT WAS NORMALLY EXPECTED FROM YOU BECAUSE OF DRINKING: NEVER
HOW OFTEN DURING THE LAST YEAR HAVE YOU HAD A FEELING OF GUILT OR REMORSE AFTER DRINKING: LESS THAN MONTHLY
HOW OFTEN DURING THE LAST YEAR HAVE YOU NEEDED AN ALCOHOLIC DRINK FIRST THING IN THE MORNING TO GET YOURSELF GOING AFTER A NIGHT OF HEAVY DRINKING: NEVER
HOW OFTEN DURING THE LAST YEAR HAVE YOU FOUND THAT YOU WERE NOT ABLE TO STOP DRINKING ONCE YOU HAD STARTED: NEVER
HOW OFTEN DURING THE LAST YEAR HAVE YOU BEEN UNABLE TO REMEMBER WHAT HAPPENED THE NIGHT BEFORE BECAUSE YOU HAD BEEN DRINKING: LESS THAN MONTHLY
HAVE YOU OR SOMEONE ELSE BEEN INJURED AS A RESULT OF YOUR DRINKING: NO
HAS A RELATIVE, FRIEND, DOCTOR, OR ANOTHER HEALTH PROFESSIONAL EXPRESSED CONCERN ABOUT YOUR DRINKING OR SUGGESTED YOU CUT DOWN: YES, BUT NOT IN THE PAST YEAR
HOW OFTEN DURING THE LAST YEAR HAVE YOU HAD A FEELING OF GUILT OR REMORSE AFTER DRINKING: LESS THAN MONTHLY

## 2024-12-10 NOTE — ACP (ADVANCE CARE PLANNING)
Advance Care Planning   The patient has the following advanced directives on file:  Advance Directives       Power of  Living Will ACP-Advance Directive ACP-Power of     Not on File Not on File Filed Not on File            The patient has appointed the following active healthcare agents:    Primary Decision Maker: Radha Sellers - Spouse - [phone]    The Patient has the following current code status:    Code Status: Prior    Visit Documentation:  I discussed Advance Care Planning with Brennon Saleem today which included the importance of making their choices for care and treatment in the case of a health event that adversely affects their decision-making abilities. He has not completed the Advance Care Directives. He has an active health care agent at this time.  Brennon Saleem was encouraged to complete the declaration forms and provide a signed copy of his medical records. I advised patient we would continue this discussion at future visits.     Lou Dumont  12/10/2024

## 2024-12-10 NOTE — PROGRESS NOTES
(HCC)     hands, ankles and feet     Past Surgical History:   Procedure Laterality Date    CHEST SURGERY Left 2006    biopsy- lung nodule- benign per pt- yearly CT     CHEST SURGERY Left 2007    VAT (due to Remicade) , benign    CHEST SURGERY Right right    VAT (due to Remicade), due to fluid in lung, benign nodule    COLONOSCOPY  11/18/2019    ORTHOPEDIC SURGERY Bilateral 2013    removal of RA nodules     ORTHOPEDIC SURGERY Bilateral     repair due to collapsed feet    PAROTIDECTOMY Right 7/5/2024    PAROTIDECTOMY performed by Louie [facility]wood [facility]     Social History     Tobacco Use    Smoking status: Every Day     Current packs/day: 1.00     Average packs/day: 1 pack/day for 51.9 years (51.9 ttl pk-yrs)     Types: Cigarettes     Start date: 1/1/1973    Smokeless tobacco: Never   Vaping Use    Vaping status: Never Used   Substance Use Topics    Alcohol use: Yes     Alcohol/week: 11.0 standard drinks of alcohol     Types: 11 Drinks containing 0.5 oz of alcohol per week    Drug use: No     Family History   Problem Relation Age of Onset    Breast Cancer Mother     Cirrhosis Mother     Lupus Mother     Kidney Disease Father     Hypertension Father     Diabetes Father     Stroke Father     Diabetes Brother     Heart Attack Brother     Lung Cancer Neg Hx     Thyroid Cancer Neg Hx       Review of Systems  Physical Exam  Vitals and nursing note reviewed.   Constitutional:       General: He is not in acute distress.     Appearance: Normal appearance. He is not ill-appearing, toxic-appearing or diaphoretic.   HENT:      Head: Normocephalic and atraumatic.   Cardiovascular:      Rate and Rhythm: Normal rate and regular rhythm.      Heart sounds: Normal heart sounds. No murmur heard.     No friction rub. No gallop.   Pulmonary:      Effort: Pulmonary effort is normal. No respiratory distress.      Breath sounds: Normal breath sounds. No stridor. No wheezing, rhonchi or rales.   Abdominal:      General: Abdomen

## 2024-12-10 NOTE — PATIENT INSTRUCTIONS
Aging online.  You need 6164-3227 mg of calcium and 0950-6952 IU of vitamin D per day. It is possible to meet your calcium requirement with diet alone, but a vitamin D supplement is usually necessary to meet this goal.  When exposed to the sun, use a sunscreen that protects against both UVA and UVB radiation with an SPF of 30 or greater. Reapply every 2 to 3 hours or after sweating, drying off with a towel, or swimming.  Always wear a seat belt when traveling in a car. Always wear a helmet when riding a bicycle or motorcycle.

## 2024-12-18 ENCOUNTER — OFFICE VISIT (OUTPATIENT)
Dept: RHEUMATOLOGY | Age: 74
End: 2024-12-18
Payer: MEDICARE

## 2024-12-18 VITALS
HEIGHT: 70 IN | SYSTOLIC BLOOD PRESSURE: 133 MMHG | WEIGHT: 143.4 LBS | DIASTOLIC BLOOD PRESSURE: 90 MMHG | HEART RATE: 84 BPM | BODY MASS INDEX: 20.53 KG/M2

## 2024-12-18 DIAGNOSIS — Z79.899 LONG-TERM USE OF HIGH-RISK MEDICATION: ICD-10-CM

## 2024-12-18 DIAGNOSIS — M05.79 SEROPOSITIVE RHEUMATOID ARTHRITIS OF MULTIPLE SITES (HCC): ICD-10-CM

## 2024-12-18 DIAGNOSIS — M05.79 SEROPOSITIVE RHEUMATOID ARTHRITIS OF MULTIPLE SITES (HCC): Primary | ICD-10-CM

## 2024-12-18 LAB
ALBUMIN SERPL-MCNC: 3.9 G/DL (ref 3.2–4.6)
ALBUMIN/GLOB SERPL: 0.9 (ref 1–1.9)
ALP SERPL-CCNC: 72 U/L (ref 40–129)
ALT SERPL-CCNC: 13 U/L (ref 8–55)
ANION GAP SERPL CALC-SCNC: 11 MMOL/L (ref 7–16)
AST SERPL-CCNC: 25 U/L (ref 15–37)
BASOPHILS # BLD: 0.1 K/UL (ref 0–0.2)
BASOPHILS NFR BLD: 1 % (ref 0–2)
BILIRUB SERPL-MCNC: 0.5 MG/DL (ref 0–1.2)
BUN SERPL-MCNC: 10 MG/DL (ref 8–23)
CALCIUM SERPL-MCNC: 9.3 MG/DL (ref 8.8–10.2)
CHLORIDE SERPL-SCNC: 105 MMOL/L (ref 98–107)
CO2 SERPL-SCNC: 29 MMOL/L (ref 20–29)
CREAT SERPL-MCNC: 0.71 MG/DL (ref 0.8–1.3)
CRP SERPL-MCNC: 0.6 MG/DL (ref 0–0.4)
DIFFERENTIAL METHOD BLD: ABNORMAL
EOSINOPHIL # BLD: 0.4 K/UL (ref 0–0.8)
EOSINOPHIL NFR BLD: 5 % (ref 0.5–7.8)
ERYTHROCYTE [DISTWIDTH] IN BLOOD BY AUTOMATED COUNT: 13.5 % (ref 11.9–14.6)
GLOBULIN SER CALC-MCNC: 4.1 G/DL (ref 2.3–3.5)
GLUCOSE SERPL-MCNC: 83 MG/DL (ref 70–99)
HCT VFR BLD AUTO: 47.2 % (ref 41.1–50.3)
HGB BLD-MCNC: 15.6 G/DL (ref 13.6–17.2)
IMM GRANULOCYTES # BLD AUTO: 0 K/UL (ref 0–0.5)
IMM GRANULOCYTES NFR BLD AUTO: 0 % (ref 0–5)
LYMPHOCYTES # BLD: 1.8 K/UL (ref 0.5–4.6)
LYMPHOCYTES NFR BLD: 25 % (ref 13–44)
MCH RBC QN AUTO: 34.3 PG (ref 26.1–32.9)
MCHC RBC AUTO-ENTMCNC: 33.1 G/DL (ref 31.4–35)
MCV RBC AUTO: 103.7 FL (ref 82–102)
MONOCYTES # BLD: 0.8 K/UL (ref 0.1–1.3)
MONOCYTES NFR BLD: 12 % (ref 4–12)
NEUTS SEG # BLD: 4.2 K/UL (ref 1.7–8.2)
NEUTS SEG NFR BLD: 57 % (ref 43–78)
NRBC # BLD: 0 K/UL (ref 0–0.2)
PLATELET # BLD AUTO: 214 K/UL (ref 150–450)
PMV BLD AUTO: 10.2 FL (ref 9.4–12.3)
POTASSIUM SERPL-SCNC: 4.5 MMOL/L (ref 3.5–5.1)
PROT SERPL-MCNC: 8 G/DL (ref 6.3–8.2)
RBC # BLD AUTO: 4.55 M/UL (ref 4.23–5.6)
SODIUM SERPL-SCNC: 145 MMOL/L (ref 136–145)
WBC # BLD AUTO: 7.3 K/UL (ref 4.3–11.1)

## 2024-12-18 PROCEDURE — 3080F DIAST BP >= 90 MM HG: CPT | Performed by: INTERNAL MEDICINE

## 2024-12-18 PROCEDURE — 1159F MED LIST DOCD IN RCRD: CPT | Performed by: INTERNAL MEDICINE

## 2024-12-18 PROCEDURE — 3017F COLORECTAL CA SCREEN DOC REV: CPT | Performed by: INTERNAL MEDICINE

## 2024-12-18 PROCEDURE — G8484 FLU IMMUNIZE NO ADMIN: HCPCS | Performed by: INTERNAL MEDICINE

## 2024-12-18 PROCEDURE — G2211 COMPLEX E/M VISIT ADD ON: HCPCS | Performed by: INTERNAL MEDICINE

## 2024-12-18 PROCEDURE — 1160F RVW MEDS BY RX/DR IN RCRD: CPT | Performed by: INTERNAL MEDICINE

## 2024-12-18 PROCEDURE — 3075F SYST BP GE 130 - 139MM HG: CPT | Performed by: INTERNAL MEDICINE

## 2024-12-18 PROCEDURE — G8420 CALC BMI NORM PARAMETERS: HCPCS | Performed by: INTERNAL MEDICINE

## 2024-12-18 PROCEDURE — 4004F PT TOBACCO SCREEN RCVD TLK: CPT | Performed by: INTERNAL MEDICINE

## 2024-12-18 PROCEDURE — G8427 DOCREV CUR MEDS BY ELIG CLIN: HCPCS | Performed by: INTERNAL MEDICINE

## 2024-12-18 PROCEDURE — 1123F ACP DISCUSS/DSCN MKR DOCD: CPT | Performed by: INTERNAL MEDICINE

## 2024-12-18 PROCEDURE — 99214 OFFICE O/P EST MOD 30 MIN: CPT | Performed by: INTERNAL MEDICINE

## 2024-12-18 RX ORDER — ADALIMUMAB 80MG/0.8ML
80 KIT SUBCUTANEOUS
Qty: 6 EACH | Refills: 1 | Status: ACTIVE | OUTPATIENT
Start: 2024-12-18

## 2024-12-18 RX ORDER — HYDROXYCHLOROQUINE SULFATE 200 MG/1
TABLET, FILM COATED ORAL
Qty: 135 TABLET | Refills: 1 | Status: SHIPPED | OUTPATIENT
Start: 2024-12-18

## 2024-12-18 ASSESSMENT — ROUTINE ASSESSMENT OF PATIENT INDEX DATA (RAPID3)
WHEN YOU AWAKENED IN THE MORNING OVER THE LAST WEEK, PLEASE INDICATE THE AMOUNT OF TIME IT TAKES UNTIL YOU ARE AS LIMBER AS YOU WILL BE FOR THE DAY: 15 MIN
ON A SCALE OF ONE TO TEN, HOW DIFFICULT WAS IT FOR YOU TO COMPLETE THE LISTED DAILY PHYSICAL TASKS OVER THE LAST WEEK: 0.4
ON A SCALE OF ONE TO TEN, HOW MUCH PAIN HAVE YOU HAD BECAUSE OF YOUR CONDITION OVER THE PAST WEEK?: 3
ON A SCALE OF ONE TO TEN, CONSIDERING ALL THE WAYS IN WHICH ILLNESS AND HEALTH CONDITIONS MAY AFFECT YOU AT THIS TIME, PLEASE INDICATE BELOW HOW YOU ARE DOING:: 3
ON A SCALE OF ONE TO TEN, HOW MUCH OF A PROBLEM HAS UNUSUAL FATIGUE OR TIREDNESS BEEN FOR YOU OVER THE PAST WEEK?: 3

## 2024-12-18 ASSESSMENT — JOINT PAIN
TOTAL NUMBER OF TENDER JOINTS: 0
TOTAL NUMBER OF SWOLLEN JOINTS: 0

## 2024-12-18 NOTE — PROGRESS NOTES
[facility] Rheumatology  Ginny Ernst M.D.  [address] , [address]   [facility] [place] [facility]  Office : [phone], Fax: [phone]     RHEUMATOLOGY OFFICE VISIT NOTE  Date of Visit:  2024 8:37 AM    Patient Information:  Name:  Brennon Saleem  :  1950  Age:  74 y.o.   Gender:  male      Mr. Saleem is here today for follow-up of RA and medication monitoring.      Last visit: 24    History of Present Illness: On talking to the patient today he states that he has been sneezing some but denies having any cough with no associated fever or chills either.  Patient's last eye exam from 04/15/24 at [facility], did not show any evidence of Plaquenil toxicity.  On talking to him further he states that he has been taking the Plaquenil 200 mg 1 pill three days of the week which does not seem to be causing a flare of his underlying joint condition.  His current joint complaints are as mentioned below.    Since the last visit, patient is feeling \"good\".    Pain: 3/10  Location: Some neck stiffness with no pain with neck ROM. No tension headaches. Occasional left para spinal muscle pain. Some mid back pain with no shoulder blade pain. Some lower back pain. Occasional buckling of the knees with no pain, warmth and redness. Pain in the ball of his feet with no swelling, warmth and redness.   Quality:  Sore stiff feeling.   Modifying Factors:  Knee sleeve helps.   Associated Symptoms:  No limitations with his ADL's. No UE or LE weakness.         2024     7:00 AM   DMARD/Biologic   AM Stiffness 15 min   Pain 3   Fatigue 3   MDHAQ 0.4   Patient Global Score 3   Medication Name Plaquenil   Medication Name Humira     Last TB screen: 24  TB result: Indeterminate      Current dose of steroids: None   How long on current dose of steroids: N/A  How long on continuous steroid therapy: N/A     Past DMARDs, if applicable (methotrexate, plaquenil/hydroxychloroquine,

## 2024-12-26 ENCOUNTER — HOSPITAL ENCOUNTER (OUTPATIENT)
Dept: CT IMAGING | Age: 74
Discharge: HOME | End: 2024-12-29
Attending: INTERNAL MEDICINE
Payer: MEDICARE

## 2024-12-26 DIAGNOSIS — Z87.891 PERSONAL HISTORY OF TOBACCO USE: ICD-10-CM

## 2024-12-26 PROCEDURE — 71271 CT THORAX LUNG CANCER SCR C-: CPT

## 2025-01-02 ENCOUNTER — TELEPHONE (OUTPATIENT)
Dept: RHEUMATOLOGY | Age: 75
End: 2025-01-02

## 2025-01-02 NOTE — TELEPHONE ENCOUNTER
Patient message states he needs PA for the Humira. Started online on [facility], sent with clinicals to [facility]. Patient takes 80 mg/0.8 ml every 14 days.   PA is in review. Can take up to 72 hours.

## 2025-01-03 NOTE — TELEPHONE ENCOUNTER
Message from Plan  CaseId:[id number];Status:Approved;Review Type:Prior Auth;Coverage Start Date:12/03/2024;Coverage End Date:12/31/2025;. Authorization Expiration Date: December 31, 2025.    [employer] message to patient with approval info

## 2025-01-06 NOTE — BRIEF OP NOTE
BRIEF OPERATIVE NOTE    Date of Procedure: 7/18/2017   Preoperative Diagnosis: Rheumatoid arthritis with rheumatoid factor of multiple sites without organ or systems involvement ([facility] [M05.79]  Rheumatoid nodule, right elbow ([facility] [G60.972]  Postoperative Diagnosis: RHEUMATOID ARTHRITIS/NODULE OF RIGHT ELBOW    Procedure(s):  RIGHT ELBOW EXCISION RHEUMATOID NODULE  Surgeon(s) and Role:     * Sree Mejia MD - Primary         Assistant Staff:       Surgical Staff:  Circ-1: Yakelin Fong RN  Circ-Relief: Angy Puckett RN  Scrub Tech-1: Brandee Jaquez  Scrub Tech-Relief: Rj Carbon  Event Time In   Incision Start 1208   Incision Close 1217     Anesthesia: [place]   Estimated Blood Loss: minimal  Specimens: * No specimens in log *   Findings: Firm oval mass about 2.5x1. 5x1.5 cmalong the subQ border of the proximal ulna.    Complications: none  Implants: * No implants in log *
Pt bibems  and scpd #[id number] from home c/o sob x 5 days. Endorses drinking alcohol. Tachy to 112bpm and O2 98% RA at triage. Stat ekg and monitor.

## 2025-03-04 DIAGNOSIS — I25.10 CORONARY ARTERY DISEASE DUE TO CALCIFIED CORONARY LESION: ICD-10-CM

## 2025-03-04 DIAGNOSIS — I25.84 CORONARY ARTERY DISEASE DUE TO CALCIFIED CORONARY LESION: ICD-10-CM

## 2025-03-04 DIAGNOSIS — I10 ESSENTIAL HYPERTENSION: ICD-10-CM

## 2025-03-04 DIAGNOSIS — E78.2 MIXED HYPERLIPIDEMIA: ICD-10-CM

## 2025-03-04 RX ORDER — AMLODIPINE BESYLATE 5 MG/1
5 TABLET ORAL DAILY
Qty: 90 TABLET | Refills: 0 | Status: SHIPPED | OUTPATIENT
Start: 2025-03-04

## 2025-03-04 RX ORDER — ROSUVASTATIN CALCIUM 20 MG/1
20 TABLET, COATED ORAL EVERY EVENING
Qty: 90 TABLET | Refills: 0 | Status: SHIPPED | OUTPATIENT
Start: 2025-03-04

## 2025-04-07 SDOH — ECONOMIC STABILITY: TRANSPORTATION INSECURITY
IN THE PAST 12 MONTHS, HAS LACK OF TRANSPORTATION KEPT YOU FROM MEETINGS, WORK, OR FROM GETTING THINGS NEEDED FOR DAILY LIVING?: NO

## 2025-04-07 SDOH — ECONOMIC STABILITY: FOOD INSECURITY: WITHIN THE PAST 12 MONTHS, YOU WORRIED THAT YOUR FOOD WOULD RUN OUT BEFORE YOU GOT MONEY TO BUY MORE.: NEVER TRUE

## 2025-04-07 SDOH — ECONOMIC STABILITY: FOOD INSECURITY: WITHIN THE PAST 12 MONTHS, THE FOOD YOU BOUGHT JUST DIDN'T LAST AND YOU DIDN'T HAVE MONEY TO GET MORE.: NEVER TRUE

## 2025-04-07 ASSESSMENT — PATIENT HEALTH QUESTIONNAIRE - PHQ9
1. LITTLE INTEREST OR PLEASURE IN DOING THINGS: NOT AT ALL
SUM OF ALL RESPONSES TO PHQ QUESTIONS 1-9: 0
SUM OF ALL RESPONSES TO PHQ QUESTIONS 1-9: 0
SUM OF ALL RESPONSES TO PHQ9 QUESTIONS 1 & 2: 0
SUM OF ALL RESPONSES TO PHQ QUESTIONS 1-9: 0
2. FEELING DOWN, DEPRESSED OR HOPELESS: NOT AT ALL
1. LITTLE INTEREST OR PLEASURE IN DOING THINGS: NOT AT ALL
2. FEELING DOWN, DEPRESSED OR HOPELESS: NOT AT ALL
SUM OF ALL RESPONSES TO PHQ QUESTIONS 1-9: 0

## 2025-04-10 ENCOUNTER — OFFICE VISIT (OUTPATIENT)
Dept: INTERNAL MEDICINE CLINIC | Facility: CLINIC | Age: 75
End: 2025-04-10

## 2025-04-10 VITALS
SYSTOLIC BLOOD PRESSURE: 125 MMHG | HEART RATE: 82 BPM | OXYGEN SATURATION: 96 % | DIASTOLIC BLOOD PRESSURE: 81 MMHG | WEIGHT: 141 LBS | BODY MASS INDEX: 20.19 KG/M2 | HEIGHT: 70 IN | RESPIRATION RATE: 16 BRPM | TEMPERATURE: 97.4 F

## 2025-04-10 DIAGNOSIS — I10 ESSENTIAL HYPERTENSION: ICD-10-CM

## 2025-04-10 DIAGNOSIS — I25.10 CORONARY ARTERY DISEASE DUE TO CALCIFIED CORONARY LESION: Primary | ICD-10-CM

## 2025-04-10 DIAGNOSIS — I25.10 CORONARY ARTERY DISEASE DUE TO CALCIFIED CORONARY LESION: ICD-10-CM

## 2025-04-10 DIAGNOSIS — M05.79 RHEUMATOID ARTHRITIS INVOLVING MULTIPLE SITES WITH POSITIVE RHEUMATOID FACTOR (HCC): ICD-10-CM

## 2025-04-10 DIAGNOSIS — E78.2 MIXED HYPERLIPIDEMIA: ICD-10-CM

## 2025-04-10 DIAGNOSIS — Z79.899 ENCOUNTER FOR LONG-TERM CURRENT USE OF MEDICATION: ICD-10-CM

## 2025-04-10 DIAGNOSIS — E53.8 B12 DEFICIENCY: ICD-10-CM

## 2025-04-10 DIAGNOSIS — I25.84 CORONARY ARTERY DISEASE DUE TO CALCIFIED CORONARY LESION: Primary | ICD-10-CM

## 2025-04-10 DIAGNOSIS — J84.9 ILD (INTERSTITIAL LUNG DISEASE) (HCC): ICD-10-CM

## 2025-04-10 DIAGNOSIS — I25.84 CORONARY ARTERY DISEASE DUE TO CALCIFIED CORONARY LESION: ICD-10-CM

## 2025-04-10 PROBLEM — K11.8 MASS OF RIGHT PAROTID GLAND: Status: RESOLVED | Noted: 2024-07-05 | Resolved: 2025-04-10

## 2025-04-10 PROBLEM — D49.0 NEOPLASM OF UNSPECIFIED BEHAVIOR OF DIGESTIVE SYSTEM: Status: RESOLVED | Noted: 2024-05-03 | Resolved: 2025-04-10

## 2025-04-10 LAB
ALBUMIN SERPL-MCNC: 3.9 G/DL (ref 3.2–4.6)
ALBUMIN/GLOB SERPL: 0.9 (ref 1–1.9)
ALP SERPL-CCNC: 75 U/L (ref 40–129)
ALT SERPL-CCNC: 21 U/L (ref 8–55)
ANION GAP SERPL CALC-SCNC: 12 MMOL/L (ref 7–16)
APPEARANCE UR: CLEAR
AST SERPL-CCNC: 31 U/L (ref 15–37)
BASOPHILS # BLD: 0.07 K/UL (ref 0–0.2)
BASOPHILS NFR BLD: 1 % (ref 0–2)
BILIRUB DIRECT SERPL-MCNC: 0.2 MG/DL (ref 0–0.3)
BILIRUB SERPL-MCNC: 0.6 MG/DL (ref 0–1.2)
BILIRUB UR QL: NEGATIVE
BUN SERPL-MCNC: 10 MG/DL (ref 8–23)
CALCIUM SERPL-MCNC: 9.3 MG/DL (ref 8.8–10.2)
CHLORIDE SERPL-SCNC: 107 MMOL/L (ref 98–107)
CHOLEST SERPL-MCNC: 148 MG/DL (ref 0–200)
CO2 SERPL-SCNC: 26 MMOL/L (ref 20–29)
COLOR UR: NORMAL
CREAT SERPL-MCNC: 0.78 MG/DL (ref 0.8–1.3)
DIFFERENTIAL METHOD BLD: ABNORMAL
EOSINOPHIL # BLD: 0.42 K/UL (ref 0–0.8)
EOSINOPHIL NFR BLD: 6.2 % (ref 0.5–7.8)
ERYTHROCYTE [DISTWIDTH] IN BLOOD BY AUTOMATED COUNT: 13.4 % (ref 11.9–14.6)
GLOBULIN SER CALC-MCNC: 4.4 G/DL (ref 2.3–3.5)
GLUCOSE SERPL-MCNC: 72 MG/DL (ref 70–99)
GLUCOSE UR STRIP.AUTO-MCNC: NEGATIVE MG/DL
HCT VFR BLD AUTO: 44.9 % (ref 41.1–50.3)
HDLC SERPL-MCNC: 80 MG/DL (ref 40–60)
HDLC SERPL: 1.8 (ref 0–5)
HGB BLD-MCNC: 15.6 G/DL (ref 13.6–17.2)
HGB UR QL STRIP: NEGATIVE
IMM GRANULOCYTES # BLD AUTO: 0.02 K/UL (ref 0–0.5)
IMM GRANULOCYTES NFR BLD AUTO: 0.3 % (ref 0–5)
KETONES UR QL STRIP.AUTO: NEGATIVE MG/DL
LDLC SERPL CALC-MCNC: 51 MG/DL (ref 0–100)
LEUKOCYTE ESTERASE UR QL STRIP.AUTO: NEGATIVE
LYMPHOCYTES # BLD: 1.88 K/UL (ref 0.5–4.6)
LYMPHOCYTES NFR BLD: 27.8 % (ref 13–44)
MCH RBC QN AUTO: 34.5 PG (ref 26.1–32.9)
MCHC RBC AUTO-ENTMCNC: 34.7 G/DL (ref 31.4–35)
MCV RBC AUTO: 99.3 FL (ref 82–102)
MONOCYTES # BLD: 0.76 K/UL (ref 0.1–1.3)
MONOCYTES NFR BLD: 11.2 % (ref 4–12)
NEUTS SEG # BLD: 3.62 K/UL (ref 1.7–8.2)
NEUTS SEG NFR BLD: 53.5 % (ref 43–78)
NITRITE UR QL STRIP.AUTO: NEGATIVE
NRBC # BLD: 0 K/UL (ref 0–0.2)
PH UR STRIP: 5.5 (ref 5–9)
PLATELET # BLD AUTO: 210 K/UL (ref 150–450)
PMV BLD AUTO: 10.1 FL (ref 9.4–12.3)
POTASSIUM SERPL-SCNC: 4.4 MMOL/L (ref 3.5–5.1)
PROT SERPL-MCNC: 8.2 G/DL (ref 6.3–8.2)
PROT UR STRIP-MCNC: NEGATIVE MG/DL
RBC # BLD AUTO: 4.52 M/UL (ref 4.23–5.6)
SODIUM SERPL-SCNC: 144 MMOL/L (ref 136–145)
SP GR UR REFRACTOMETRY: 1.01 (ref 1–1.02)
TRIGL SERPL-MCNC: 83 MG/DL (ref 0–150)
TSH, 3RD GENERATION: 1.36 UIU/ML (ref 0.27–4.2)
UROBILINOGEN UR QL STRIP.AUTO: 0.2 EU/DL (ref 0.2–1)
VIT B12 SERPL-MCNC: 2820 PG/ML (ref 193–986)
VLDLC SERPL CALC-MCNC: 17 MG/DL (ref 6–23)
WBC # BLD AUTO: 6.8 K/UL (ref 4.3–11.1)

## 2025-04-10 RX ORDER — ROSUVASTATIN CALCIUM 20 MG/1
20 TABLET, COATED ORAL EVERY EVENING
Qty: 90 TABLET | Refills: 0 | Status: SHIPPED | OUTPATIENT
Start: 2025-04-10

## 2025-04-10 RX ORDER — AMLODIPINE BESYLATE 5 MG/1
5 TABLET ORAL DAILY
Qty: 90 TABLET | Refills: 0 | Status: SHIPPED | OUTPATIENT
Start: 2025-04-10

## 2025-04-10 ASSESSMENT — ENCOUNTER SYMPTOMS: SHORTNESS OF BREATH: 0

## 2025-04-10 NOTE — PROGRESS NOTES
amLODIPine (NORVASC) 5 MG tablet; Take 1 tablet by mouth daily  -     Basic Metabolic Panel; Future  -     CBC with Auto Differential; Future  -     Lipid Panel; Future  -     Hepatic Function Panel; Future  -     TSH; Future  -     Urinalysis; Future  -     Vitamin B12; Future    Mixed hyperlipidemia  -     rosuvastatin (CRESTOR) 20 MG tablet; Take 1 tablet by mouth every evening  -     Basic Metabolic Panel; Future  -     CBC with Auto Differential; Future  -     Lipid Panel; Future  -     Hepatic Function Panel; Future  -     TSH; Future  -     Urinalysis; Future  -     Vitamin B12; Future    ILD (interstitial lung disease) (HCC)  -     Vitamin B12; Future    Rheumatoid arthritis involving multiple sites with positive rheumatoid factor (HCC)  -     Basic Metabolic Panel; Future  -     CBC with Auto Differential; Future  -     Hepatic Function Panel; Future  -     Urinalysis; Future  -     Vitamin B12; Future    B12 deficiency  -     Vitamin B12; Future    Encounter for long-term current use of medication  -     Vitamin B12; Future      Return in about 4 months (around 8/10/2025).

## 2025-04-11 ENCOUNTER — RESULTS FOLLOW-UP (OUTPATIENT)
Dept: INTERNAL MEDICINE CLINIC | Facility: CLINIC | Age: 75
End: 2025-04-11

## 2025-04-21 ENCOUNTER — OFFICE VISIT (OUTPATIENT)
Dept: RHEUMATOLOGY | Age: 75
End: 2025-04-21
Payer: MEDICARE

## 2025-04-21 VITALS
SYSTOLIC BLOOD PRESSURE: 118 MMHG | HEART RATE: 80 BPM | WEIGHT: 141.8 LBS | HEIGHT: 70 IN | OXYGEN SATURATION: 98 % | DIASTOLIC BLOOD PRESSURE: 74 MMHG | BODY MASS INDEX: 20.3 KG/M2

## 2025-04-21 DIAGNOSIS — Z79.899 LONG-TERM USE OF HIGH-RISK MEDICATION: ICD-10-CM

## 2025-04-21 DIAGNOSIS — M05.79 SEROPOSITIVE RHEUMATOID ARTHRITIS OF MULTIPLE SITES (HCC): Primary | ICD-10-CM

## 2025-04-21 PROCEDURE — 3017F COLORECTAL CA SCREEN DOC REV: CPT | Performed by: INTERNAL MEDICINE

## 2025-04-21 PROCEDURE — 4004F PT TOBACCO SCREEN RCVD TLK: CPT | Performed by: INTERNAL MEDICINE

## 2025-04-21 PROCEDURE — 1123F ACP DISCUSS/DSCN MKR DOCD: CPT | Performed by: INTERNAL MEDICINE

## 2025-04-21 PROCEDURE — 1160F RVW MEDS BY RX/DR IN RCRD: CPT | Performed by: INTERNAL MEDICINE

## 2025-04-21 PROCEDURE — G8420 CALC BMI NORM PARAMETERS: HCPCS | Performed by: INTERNAL MEDICINE

## 2025-04-21 PROCEDURE — 3078F DIAST BP <80 MM HG: CPT | Performed by: INTERNAL MEDICINE

## 2025-04-21 PROCEDURE — 3074F SYST BP LT 130 MM HG: CPT | Performed by: INTERNAL MEDICINE

## 2025-04-21 PROCEDURE — 99214 OFFICE O/P EST MOD 30 MIN: CPT | Performed by: INTERNAL MEDICINE

## 2025-04-21 PROCEDURE — 1159F MED LIST DOCD IN RCRD: CPT | Performed by: INTERNAL MEDICINE

## 2025-04-21 PROCEDURE — G2211 COMPLEX E/M VISIT ADD ON: HCPCS | Performed by: INTERNAL MEDICINE

## 2025-04-21 PROCEDURE — G8427 DOCREV CUR MEDS BY ELIG CLIN: HCPCS | Performed by: INTERNAL MEDICINE

## 2025-04-21 RX ORDER — ADALIMUMAB 80MG/0.8ML
80 KIT SUBCUTANEOUS
Qty: 6 EACH | Refills: 1 | Status: ACTIVE | OUTPATIENT
Start: 2025-04-21

## 2025-04-21 ASSESSMENT — JOINT PAIN
TOTAL NUMBER OF TENDER JOINTS: 0
TOTAL NUMBER OF SWOLLEN JOINTS: 0

## 2025-04-21 ASSESSMENT — ROUTINE ASSESSMENT OF PATIENT INDEX DATA (RAPID3)
ON A SCALE OF ONE TO TEN, HOW MUCH PAIN HAVE YOU HAD BECAUSE OF YOUR CONDITION OVER THE PAST WEEK?: 3
ON A SCALE OF ONE TO TEN, HOW MUCH OF A PROBLEM HAS UNUSUAL FATIGUE OR TIREDNESS BEEN FOR YOU OVER THE PAST WEEK?: 3
ON A SCALE OF ONE TO TEN, CONSIDERING ALL THE WAYS IN WHICH ILLNESS AND HEALTH CONDITIONS MAY AFFECT YOU AT THIS TIME, PLEASE INDICATE BELOW HOW YOU ARE DOING:: 3
WHEN YOU AWAKENED IN THE MORNING OVER THE LAST WEEK, PLEASE INDICATE THE AMOUNT OF TIME IT TAKES UNTIL YOU ARE AS LIMBER AS YOU WILL BE FOR THE DAY: 30 MIN
ON A SCALE OF ONE TO TEN, HOW DIFFICULT WAS IT FOR YOU TO COMPLETE THE LISTED DAILY PHYSICAL TASKS OVER THE LAST WEEK: 0.3

## 2025-04-21 NOTE — PROGRESS NOTES
extremes of muscle mass, extra-renal metabolism of creatinine, excessive creatine ingestion, or following therapy that affects renal tubular secretion.      Calcium 04/10/2025 9.3  8.8 - 10.2 MG/DL Final    Color, UA 04/10/2025 YELLOW/STRAW    Final    Color Reference Range: Straw, Yellow or Dark Yellow    Appearance 04/10/2025 CLEAR    Final    Specific Gravity, UA 04/10/2025 1.013  1.001 - 1.023   Final    pH, Urine 04/10/2025 5.5  5.0 - 9.0   Final    Protein, UA 04/10/2025 Negative  Negative mg/dL Final    Glucose, Ur 04/10/2025 Negative  Negative mg/dL Final    Ketones, Urine 04/10/2025 Negative  Negative mg/dL Final    Bilirubin, Urine 04/10/2025 Negative  Negative   Final    Blood, Urine 04/10/2025 Negative  Negative   Final    Urobilinogen, Urine 04/10/2025 0.2  0.2 - 1.0 EU/dL Final    Nitrite, Urine 04/10/2025 Negative  Negative   Final    Leukocyte Esterase, Urine 04/10/2025 Negative  Negative   Final     The results above were reviewed and discussed with patient.       Assessment/Plan:   Brennon Saleem is a 74 y.o. male who presents with:     Seropositive rheumatoid arthritis of multiple sites (HCC): Patient was instructed to continue Humira 80 mg one-shot to be administered to self every 2 weeks.  Patient is aware that if he is sick requiring him to be on an antibiotic or antiviral drug he will need to skip administering the Humira until he has completed the antibiotic/antiviral course and is over the infection.  He was instructed to continue Plaquenil 200 mg 1 pill on Monday, Wednesday and Friday based on his body weight.  While on Plaquenil patient is aware that he would need to keep up with yearly eye exams.  -     HUMIRA, 2 PEN, 80 MG/0.8ML FixmoKT injection pen kit; Inject 0.8 mLs into the skin every 14 days    Long-term use of high-risk medication: Since patient did have a BMP, LFTs and CBC with differential done on 4/10/2025 that was reviewed by me with the patient I did not feel the need to

## 2025-05-02 ENCOUNTER — TELEPHONE (OUTPATIENT)
Dept: RHEUMATOLOGY | Age: 75
End: 2025-05-02

## 2025-05-02 NOTE — TELEPHONE ENCOUNTER
Eye note received 05/01/25 from [place] [facility] DOS 05/01/25, it was put in Dr Ernst's folder to be  reviewed and signed, then it will be sent to front office be scanned into the chart.

## 2025-06-03 DIAGNOSIS — N52.9 IMPOTENCE DUE TO ERECTILE DYSFUNCTION: ICD-10-CM

## 2025-06-03 RX ORDER — SILDENAFIL 100 MG/1
100 TABLET, FILM COATED ORAL DAILY PRN
Qty: 10 TABLET | Refills: 3 | Status: SHIPPED | OUTPATIENT
Start: 2025-06-03

## 2025-06-04 ENCOUNTER — PATIENT MESSAGE (OUTPATIENT)
Dept: RHEUMATOLOGY | Age: 75
End: 2025-06-04

## 2025-06-04 DIAGNOSIS — M05.79 SEROPOSITIVE RHEUMATOID ARTHRITIS OF MULTIPLE SITES (HCC): Primary | ICD-10-CM

## 2025-06-04 NOTE — TELEPHONE ENCOUNTER
Dr VANESSA's pt.   Next ov 08/20/25 and   last ov was 04/21/25.    Rx for Prednisone was called in to [place] pharmacist at [facility] on [address].

## 2025-06-19 RX ORDER — PREDNISONE 20 MG/1
TABLET ORAL
Qty: 12 TABLET | Refills: 0 | OUTPATIENT
Start: 2025-06-19

## 2025-07-07 RX ORDER — FAMOTIDINE 40 MG/1
40 TABLET, FILM COATED ORAL EVERY EVENING
Qty: 90 TABLET | Refills: 5 | Status: SHIPPED | OUTPATIENT
Start: 2025-07-07

## 2025-08-13 ENCOUNTER — OFFICE VISIT (OUTPATIENT)
Dept: INTERNAL MEDICINE CLINIC | Facility: CLINIC | Age: 75
End: 2025-08-13

## 2025-08-13 VITALS
HEIGHT: 70 IN | TEMPERATURE: 98.1 F | BODY MASS INDEX: 20.19 KG/M2 | SYSTOLIC BLOOD PRESSURE: 124 MMHG | DIASTOLIC BLOOD PRESSURE: 79 MMHG | OXYGEN SATURATION: 97 % | RESPIRATION RATE: 16 BRPM | WEIGHT: 141 LBS | HEART RATE: 90 BPM

## 2025-08-13 DIAGNOSIS — M05.79 RHEUMATOID ARTHRITIS INVOLVING MULTIPLE SITES WITH POSITIVE RHEUMATOID FACTOR (HCC): ICD-10-CM

## 2025-08-13 DIAGNOSIS — I25.84 CORONARY ARTERY DISEASE DUE TO CALCIFIED CORONARY LESION: Primary | ICD-10-CM

## 2025-08-13 DIAGNOSIS — J84.9 ILD (INTERSTITIAL LUNG DISEASE) (HCC): ICD-10-CM

## 2025-08-13 DIAGNOSIS — I73.9 CLAUDICATION: ICD-10-CM

## 2025-08-13 DIAGNOSIS — I25.10 CORONARY ARTERY DISEASE DUE TO CALCIFIED CORONARY LESION: Primary | ICD-10-CM

## 2025-08-13 DIAGNOSIS — I10 ESSENTIAL HYPERTENSION: ICD-10-CM

## 2025-08-13 DIAGNOSIS — E78.2 MIXED HYPERLIPIDEMIA: ICD-10-CM

## 2025-08-13 RX ORDER — AMLODIPINE BESYLATE 5 MG/1
5 TABLET ORAL DAILY
Qty: 90 TABLET | Refills: 1 | Status: SHIPPED | OUTPATIENT
Start: 2025-08-13

## 2025-08-13 RX ORDER — ROSUVASTATIN CALCIUM 20 MG/1
20 TABLET, COATED ORAL EVERY EVENING
Qty: 90 TABLET | Refills: 1 | Status: SHIPPED | OUTPATIENT
Start: 2025-08-13

## 2025-08-13 SDOH — ECONOMIC STABILITY: FOOD INSECURITY: WITHIN THE PAST 12 MONTHS, YOU WORRIED THAT YOUR FOOD WOULD RUN OUT BEFORE YOU GOT MONEY TO BUY MORE.: NEVER TRUE

## 2025-08-13 SDOH — ECONOMIC STABILITY: FOOD INSECURITY: WITHIN THE PAST 12 MONTHS, THE FOOD YOU BOUGHT JUST DIDN'T LAST AND YOU DIDN'T HAVE MONEY TO GET MORE.: NEVER TRUE

## 2025-08-13 ASSESSMENT — ENCOUNTER SYMPTOMS
BLOOD IN STOOL: 0
SHORTNESS OF BREATH: 0

## 2025-08-20 ENCOUNTER — OFFICE VISIT (OUTPATIENT)
Dept: RHEUMATOLOGY | Age: 75
End: 2025-08-20
Payer: MEDICARE

## 2025-08-20 VITALS
SYSTOLIC BLOOD PRESSURE: 112 MMHG | OXYGEN SATURATION: 96 % | HEIGHT: 70 IN | HEART RATE: 81 BPM | WEIGHT: 141 LBS | BODY MASS INDEX: 20.19 KG/M2 | DIASTOLIC BLOOD PRESSURE: 70 MMHG

## 2025-08-20 DIAGNOSIS — Z79.899 LONG-TERM USE OF HIGH-RISK MEDICATION: ICD-10-CM

## 2025-08-20 DIAGNOSIS — M05.79 SEROPOSITIVE RHEUMATOID ARTHRITIS OF MULTIPLE SITES (HCC): ICD-10-CM

## 2025-08-20 DIAGNOSIS — M05.79 SEROPOSITIVE RHEUMATOID ARTHRITIS OF MULTIPLE SITES (HCC): Primary | ICD-10-CM

## 2025-08-20 LAB
ALBUMIN SERPL-MCNC: 3.7 G/DL (ref 3.2–4.6)
ALBUMIN/GLOB SERPL: 0.9 (ref 1–1.9)
ALP SERPL-CCNC: 66 U/L (ref 40–129)
ALT SERPL-CCNC: 15 U/L (ref 8–55)
ANION GAP SERPL CALC-SCNC: 14 MMOL/L (ref 7–16)
AST SERPL-CCNC: 32 U/L (ref 15–37)
BASOPHILS # BLD: 0.06 K/UL (ref 0–0.2)
BASOPHILS NFR BLD: 0.8 % (ref 0–2)
BILIRUB SERPL-MCNC: 0.5 MG/DL (ref 0–1.2)
BUN SERPL-MCNC: 6 MG/DL (ref 8–23)
CALCIUM SERPL-MCNC: 9.6 MG/DL (ref 8.8–10.2)
CHLORIDE SERPL-SCNC: 107 MMOL/L (ref 98–107)
CO2 SERPL-SCNC: 23 MMOL/L (ref 20–29)
CREAT SERPL-MCNC: 0.61 MG/DL (ref 0.8–1.3)
CRP SERPL-MCNC: 1.8 MG/DL (ref 0–0.4)
DIFFERENTIAL METHOD BLD: ABNORMAL
EOSINOPHIL # BLD: 0.34 K/UL (ref 0–0.8)
EOSINOPHIL NFR BLD: 4.6 % (ref 0.5–7.8)
ERYTHROCYTE [DISTWIDTH] IN BLOOD BY AUTOMATED COUNT: 13.7 % (ref 11.9–14.6)
GLOBULIN SER CALC-MCNC: 4.2 G/DL (ref 2.3–3.5)
GLUCOSE SERPL-MCNC: 85 MG/DL (ref 70–99)
HCT VFR BLD AUTO: 42.5 % (ref 41.1–50.3)
HGB BLD-MCNC: 14.6 G/DL (ref 13.6–17.2)
IMM GRANULOCYTES # BLD AUTO: 0.02 K/UL (ref 0–0.5)
IMM GRANULOCYTES NFR BLD AUTO: 0.3 % (ref 0–5)
LYMPHOCYTES # BLD: 1.89 K/UL (ref 0.5–4.6)
LYMPHOCYTES NFR BLD: 25.5 % (ref 13–44)
MCH RBC QN AUTO: 35.4 PG (ref 26.1–32.9)
MCHC RBC AUTO-ENTMCNC: 34.4 G/DL (ref 31.4–35)
MCV RBC AUTO: 102.9 FL (ref 82–102)
MONOCYTES # BLD: 0.65 K/UL (ref 0.1–1.3)
MONOCYTES NFR BLD: 8.8 % (ref 4–12)
NEUTS SEG # BLD: 4.46 K/UL (ref 1.7–8.2)
NEUTS SEG NFR BLD: 60 % (ref 43–78)
NRBC # BLD: 0 K/UL (ref 0–0.2)
PLATELET # BLD AUTO: 242 K/UL (ref 150–450)
PMV BLD AUTO: 10.4 FL (ref 9.4–12.3)
POTASSIUM SERPL-SCNC: 4.1 MMOL/L (ref 3.5–5.1)
PROT SERPL-MCNC: 7.9 G/DL (ref 6.3–8.2)
RBC # BLD AUTO: 4.13 M/UL (ref 4.23–5.6)
SODIUM SERPL-SCNC: 144 MMOL/L (ref 136–145)
WBC # BLD AUTO: 7.4 K/UL (ref 4.3–11.1)

## 2025-08-20 PROCEDURE — 3017F COLORECTAL CA SCREEN DOC REV: CPT | Performed by: INTERNAL MEDICINE

## 2025-08-20 PROCEDURE — 1159F MED LIST DOCD IN RCRD: CPT | Performed by: INTERNAL MEDICINE

## 2025-08-20 PROCEDURE — 1160F RVW MEDS BY RX/DR IN RCRD: CPT | Performed by: INTERNAL MEDICINE

## 2025-08-20 PROCEDURE — G8427 DOCREV CUR MEDS BY ELIG CLIN: HCPCS | Performed by: INTERNAL MEDICINE

## 2025-08-20 PROCEDURE — 1123F ACP DISCUSS/DSCN MKR DOCD: CPT | Performed by: INTERNAL MEDICINE

## 2025-08-20 PROCEDURE — G2211 COMPLEX E/M VISIT ADD ON: HCPCS | Performed by: INTERNAL MEDICINE

## 2025-08-20 PROCEDURE — 4004F PT TOBACCO SCREEN RCVD TLK: CPT | Performed by: INTERNAL MEDICINE

## 2025-08-20 PROCEDURE — 3074F SYST BP LT 130 MM HG: CPT | Performed by: INTERNAL MEDICINE

## 2025-08-20 PROCEDURE — 3078F DIAST BP <80 MM HG: CPT | Performed by: INTERNAL MEDICINE

## 2025-08-20 PROCEDURE — 99214 OFFICE O/P EST MOD 30 MIN: CPT | Performed by: INTERNAL MEDICINE

## 2025-08-20 PROCEDURE — G8420 CALC BMI NORM PARAMETERS: HCPCS | Performed by: INTERNAL MEDICINE

## 2025-08-20 RX ORDER — HYDROXYCHLOROQUINE SULFATE 200 MG/1
TABLET, FILM COATED ORAL
Qty: 180 TABLET | Refills: 1 | Status: SHIPPED | OUTPATIENT
Start: 2025-08-20

## 2025-08-20 RX ORDER — ADALIMUMAB 80MG/0.8ML
80 KIT SUBCUTANEOUS
Qty: 6 EACH | Refills: 1 | Status: ACTIVE | OUTPATIENT
Start: 2025-08-20

## 2025-08-20 ASSESSMENT — JOINT PAIN
TOTAL NUMBER OF TENDER JOINTS: 12
TOTAL NUMBER OF SWOLLEN JOINTS: 5

## 2025-08-20 ASSESSMENT — ROUTINE ASSESSMENT OF PATIENT INDEX DATA (RAPID3)
ON A SCALE OF ONE TO TEN, HOW MUCH PAIN HAVE YOU HAD BECAUSE OF YOUR CONDITION OVER THE PAST WEEK?: 6
ON A SCALE OF ONE TO TEN, HOW DIFFICULT WAS IT FOR YOU TO COMPLETE THE LISTED DAILY PHYSICAL TASKS OVER THE LAST WEEK: 0.7
ON A SCALE OF ONE TO TEN, HOW MUCH OF A PROBLEM HAS UNUSUAL FATIGUE OR TIREDNESS BEEN FOR YOU OVER THE PAST WEEK?: 3
ON A SCALE OF ONE TO TEN, CONSIDERING ALL THE WAYS IN WHICH ILLNESS AND HEALTH CONDITIONS MAY AFFECT YOU AT THIS TIME, PLEASE INDICATE BELOW HOW YOU ARE DOING:: 5
WHEN YOU AWAKENED IN THE MORNING OVER THE LAST WEEK, PLEASE INDICATE THE AMOUNT OF TIME IT TAKES UNTIL YOU ARE AS LIMBER AS YOU WILL BE FOR THE DAY: 1 HOUR

## 2025-08-22 RX ORDER — PREDNISONE 10 MG/1
TABLET ORAL
Qty: 21 TABLET | Refills: 0 | Status: SHIPPED | OUTPATIENT
Start: 2025-08-22 | End: 2025-09-05

## 2025-08-27 ENCOUNTER — TELEPHONE (OUTPATIENT)
Dept: INTERNAL MEDICINE CLINIC | Facility: CLINIC | Age: 75
End: 2025-08-27

## 2025-08-27 ENCOUNTER — RESULTS FOLLOW-UP (OUTPATIENT)
Dept: INTERNAL MEDICINE CLINIC | Facility: CLINIC | Age: 75
End: 2025-08-27

## 2025-08-27 ENCOUNTER — HOSPITAL ENCOUNTER (OUTPATIENT)
Dept: ULTRASOUND IMAGING | Age: 75
Discharge: HOME | End: 2025-08-30
Attending: INTERNAL MEDICINE
Payer: MEDICARE

## 2025-08-27 DIAGNOSIS — I73.9 CLAUDICATION: ICD-10-CM

## 2025-08-27 DIAGNOSIS — I72.4 POPLITEAL ARTERY ANEURYSM: Primary | ICD-10-CM

## 2025-08-27 LAB
VAS LEFT ABI: 1.15
VAS LEFT ARM BP: 118 MMHG
VAS LEFT DORSALIS PEDIS BP: 138 MMHG
VAS LEFT PTA BP: 139 MMHG
VAS LEFT TBI: 0.3
VAS LEFT TOE PRESSURE: 36 MMHG
VAS RIGHT ABI: 1.22
VAS RIGHT ARM BP: 121 MMHG
VAS RIGHT DORSALIS PEDIS BP: 148 MMHG
VAS RIGHT PTA BP: 144 MMHG
VAS RIGHT TBI: 0.36
VAS RIGHT TOE PRESSURE: 43 MMHG

## 2025-08-27 PROCEDURE — 93925 LOWER EXTREMITY STUDY: CPT | Performed by: RADIOLOGY

## 2025-08-27 PROCEDURE — 93922 UPR/L XTREMITY ART 2 LEVELS: CPT

## 2025-09-05 ENCOUNTER — OFFICE VISIT (OUTPATIENT)
Dept: VASCULAR SURGERY | Age: 75
End: 2025-09-05
Payer: MEDICARE

## 2025-09-05 VITALS
OXYGEN SATURATION: 96 % | BODY MASS INDEX: 20.26 KG/M2 | HEIGHT: 70 IN | DIASTOLIC BLOOD PRESSURE: 76 MMHG | WEIGHT: 141.5 LBS | SYSTOLIC BLOOD PRESSURE: 131 MMHG | HEART RATE: 78 BPM

## 2025-09-05 DIAGNOSIS — I72.4 POPLITEAL ARTERY ANEURYSM: Primary | ICD-10-CM

## 2025-09-05 PROCEDURE — 3017F COLORECTAL CA SCREEN DOC REV: CPT | Performed by: SURGERY

## 2025-09-05 PROCEDURE — G8420 CALC BMI NORM PARAMETERS: HCPCS | Performed by: SURGERY

## 2025-09-05 PROCEDURE — 1159F MED LIST DOCD IN RCRD: CPT | Performed by: SURGERY

## 2025-09-05 PROCEDURE — 99204 OFFICE O/P NEW MOD 45 MIN: CPT | Performed by: SURGERY

## 2025-09-05 PROCEDURE — G8427 DOCREV CUR MEDS BY ELIG CLIN: HCPCS | Performed by: SURGERY

## 2025-09-05 PROCEDURE — 3078F DIAST BP <80 MM HG: CPT | Performed by: SURGERY

## 2025-09-05 PROCEDURE — 3075F SYST BP GE 130 - 139MM HG: CPT | Performed by: SURGERY

## 2025-09-05 PROCEDURE — 4004F PT TOBACCO SCREEN RCVD TLK: CPT | Performed by: SURGERY

## 2025-09-05 PROCEDURE — 1123F ACP DISCUSS/DSCN MKR DOCD: CPT | Performed by: SURGERY

## (undated) DEVICE — REM POLYHESIVE ADULT PATIENT RETURN ELECTRODE: Brand: VALLEYLAB

## (undated) DEVICE — ELECTRODE ES L25IN PTFE MEGAFINE EXT NDL E Z CLN

## (undated) DEVICE — PROBE 8225101 5PK STD PRASS FL TIP ROHS

## (undated) DEVICE — BANDAGE,GAUZE,CONFORMING,3"X75",STRL,LF: Brand: MEDLINE

## (undated) DEVICE — SUTURE PROL SZ 5-0 L18IN NONABSORBABLE BLU L13MM P-3 3/8 8698G

## (undated) DEVICE — (D)SYR 10ML 1/5ML GRAD NSAF -- PKGING CHANGE USE ITEM 338027

## (undated) DEVICE — BETADINE 5% EYE SOL

## (undated) DEVICE — ELECTRODE 8227411 PAIRED 4 CH SET ROHS

## (undated) DEVICE — SPONGE GZ W6XL6IN COT 6 PLY SUP FLUF EXTRA ABSRB FOR PRE OP

## (undated) DEVICE — KIT PROCEDURE SURG HEAD AND NECK TOTE

## (undated) DEVICE — SPONGE SURG SM 3/8IN WHT PNUT DISECT RADPQ ST

## (undated) DEVICE — TOWEL,OR,DSP,ST,BLUE,STD,4/PK,20PK/CS: Brand: MEDLINE

## (undated) DEVICE — GLOVE ORANGE PI 7 1/2   MSG9075

## (undated) DEVICE — DRAPE,HAND,STERILE: Brand: MEDLINE

## (undated) DEVICE — BUTTON SWITCH PENCIL BLADE ELECTRODE, HOLSTER: Brand: EDGE

## (undated) DEVICE — PENCIL ES L3M BTTN SWCH HOLSTER W/ BLDE ELECTRD EDGE

## (undated) DEVICE — SURGICAL PROCEDURE PACK BASIC ST FRANCIS

## (undated) DEVICE — SUTURE PERMAHAND SZ 3-0 L18IN NONABSORBABLE BLK SILK BRAID A184H

## (undated) DEVICE — CORD ES L12FT BPLR FRCP

## (undated) DEVICE — APPLIER CLP L9.375IN APER 2.1MM CLS L3.8MM 20 SM TI CLP

## (undated) DEVICE — PREMIUM WET SKIN PREP TRAY: Brand: MEDLINE INDUSTRIES, INC.

## (undated) DEVICE — Device

## (undated) DEVICE — 3M™ TEGADERM™ TRANSPARENT FILM DRESSING FRAME STYLE, 1624W, 2-3/8 IN X 2-3/4 IN (6 CM X 7 CM), 100/CT 4CT/CASE: Brand: 3M™ TEGADERM™

## (undated) DEVICE — ZIMMER® STERILE DISPOSABLE TOURNIQUET CUFF WITH PLC, DUAL PORT, SINGLE BLADDER, 18 IN. (46 CM)

## (undated) DEVICE — SOLUTION IRRIG 1000ML 0.9% SOD CHL USP POUR PLAS BTL

## (undated) DEVICE — AMD ANTIMICROBIAL GAUZE SPONGES,12 PLY USP TYPE VII, 0.2% POLYHEXAMETHYLENE BIGUANIDE HCI (PHMB): Brand: CURITY

## (undated) DEVICE — SPONGE: SPECIALTY PEANUT XR 100/CS: Brand: MEDICAL ACTION INDUSTRIES

## (undated) DEVICE — PADDING CAST SOF-ROL 4INX4YD -- NS

## (undated) DEVICE — DISSECTOR ENDOSCP L21CM TIP CURVATURE 40DEG FN CRV JAW VES

## (undated) DEVICE — AGENT HEMSTAT W2XL14IN OXIDIZED REGENERATED CELOS ABSRB FOR

## (undated) DEVICE — NEEDLE HYPO 25GA L1.5IN BLU POLYPR HUB S STL REG BVL STR

## (undated) DEVICE — SOLUTION IV 1000ML 0.9% SOD CHL

## (undated) DEVICE — SUTURE VICRYL + 3-0 L27IN ABSRB UD PS-2 L19MM 3/8 CIR PRIM VCP427H

## (undated) DEVICE — 3M™ TEGADERM™ TRANSPARENT FILM DRESSING FRAME STYLE, 1626W, 4 IN X 4-3/4 IN (10 CM X 12 CM), 50/CT 4CT/CASE: Brand: 3M™ TEGADERM™

## (undated) DEVICE — CARDINAL HEALTH FLEXIBLE LIGHT HANDLE COVER: Brand: CARDINAL HEALTH

## (undated) DEVICE — BLADE ES ELASTOMERIC COAT INSUL DURABLE BEND UPTO 90DEG

## (undated) DEVICE — SUTURE ETHLN SZ 4-0 L18IN NONABSORBABLE BLK L19MM PS-2 3/8 1667H

## (undated) DEVICE — PAD,NON-ADHERENT,3X8,STERILE,LF,1/PK: Brand: MEDLINE

## (undated) DEVICE — FORCEPS BPLR L210MM TIP L1.5 WRK L105MM STR BAYNT INSUL DISP

## (undated) DEVICE — 1000 S-DRAPE TOWEL DRAPE 10/BX: Brand: STERI-DRAPE™

## (undated) DEVICE — STERILE HOOK LOCK LATEX FREE ELASTIC BANDAGE 2INX5YD: Brand: HOOK LOCK™

## (undated) DEVICE — MARKER SURG SKIN UTIL BLK REG TIP NONSMEARING W/ 6IN RUL

## (undated) DEVICE — DISPOSABLE BIPOLAR CODE, 12' (3.66 M): Brand: CONMED

## (undated) DEVICE — SUTURE VCRL RAPIDE SZ 4-0 L18IN ABSRB UD L19MM PS-2 1/2 CIR VR496

## (undated) DEVICE — DRAPE TWL SURG 16X26IN BLU ORB04] ALLCARE INC]

## (undated) DEVICE — (D)PREP SKN CHLRAPRP APPL 26ML -- CONVERT TO ITEM 371833

## (undated) DEVICE — DRAPE,INSTRUMENT,MAGNETIC,10X16: Brand: MEDLINE

## (undated) DEVICE — ELECTRODE PT RET AD L9FT HI MOIST COND ADH HYDRGEL CORDED

## (undated) DEVICE — ELECTRODE 8227410 PAIRED 2 CH SET ROHS

## (undated) DEVICE — DISPOSABLE EQUIPMENT COVER: Brand: SLUSH DRAPE

## (undated) DEVICE — SUTURE NONABSORBABLE MONOFILAMENT 4-0 PS-2 18 IN BLU PROLENE 8682H

## (undated) DEVICE — HOOK RETRCT DIA5MM SHRP E STAY DISP FOR LONE STAR SELF RET